# Patient Record
Sex: MALE | Race: WHITE | Employment: OTHER | URBAN - METROPOLITAN AREA
[De-identification: names, ages, dates, MRNs, and addresses within clinical notes are randomized per-mention and may not be internally consistent; named-entity substitution may affect disease eponyms.]

---

## 2018-02-18 PROBLEM — E66.01 OBESITY, CLASS III, BMI 40-49.9 (MORBID OBESITY) (HCC): Chronic | Status: ACTIVE | Noted: 2018-02-18

## 2018-02-18 PROBLEM — I96 GANGRENE OF TOE OF LEFT FOOT (HCC): Status: ACTIVE | Noted: 2018-02-18

## 2018-02-18 PROBLEM — E66.813 OBESITY, CLASS III, BMI 40-49.9 (MORBID OBESITY) (HCC): Chronic | Status: ACTIVE | Noted: 2018-02-18

## 2018-02-18 PROBLEM — E11.9 TYPE 2 DIABETES MELLITUS (HCC): Chronic | Status: ACTIVE | Noted: 2018-02-18

## 2018-02-18 PROBLEM — I10 ESSENTIAL HYPERTENSION: Chronic | Status: ACTIVE | Noted: 2018-02-18

## 2018-02-20 PROBLEM — E11.628 DIABETIC FOOT INFECTION (HCC): Status: ACTIVE | Noted: 2018-02-20

## 2018-02-20 PROBLEM — L08.9 DIABETIC FOOT INFECTION (HCC): Status: ACTIVE | Noted: 2018-02-20

## 2018-02-20 PROBLEM — M86.9 TOE OSTEOMYELITIS, LEFT (HCC): Status: ACTIVE | Noted: 2018-02-20

## 2018-02-28 ENCOUNTER — HOSPITAL ENCOUNTER (OUTPATIENT)
Dept: WOUND CARE | Age: 38
Discharge: HOME | End: 2018-02-28
Attending: PODIATRIST | Admitting: PODIATRIST

## 2018-02-28 VITALS
RESPIRATION RATE: 16 BRPM | SYSTOLIC BLOOD PRESSURE: 123 MMHG | TEMPERATURE: 97.4 F | DIASTOLIC BLOOD PRESSURE: 86 MMHG | HEART RATE: 100 BPM

## 2018-02-28 ASSESSMENT — PAIN SCALES - GENERAL: PAINLEVEL_OUTOF10: 5

## 2018-02-28 ASSESSMENT — PAIN DESCRIPTION - LOCATION: LOCATION: FOOT

## 2018-02-28 ASSESSMENT — PAIN DESCRIPTION - ORIENTATION: ORIENTATION: LEFT

## 2018-02-28 ASSESSMENT — PAIN DESCRIPTION - DESCRIPTORS: DESCRIPTORS: ACHING

## 2018-02-28 ASSESSMENT — PAIN DESCRIPTION - PAIN TYPE: TYPE: CHRONIC PAIN

## 2018-02-28 NOTE — PROGRESS NOTES
compression to the foot and ankle  -  PWB heel only  - Keep leg elevated when not active. - Stressed appropriate blood glucose control  -  Continue antibiotics per infectious disease  -  Follow-up in 2 weeks for suture removal  -The patient has any questions, medications he is to call the office.       Chyna Clemons DPM  Cell:[phone]

## 2018-03-21 ENCOUNTER — HOSPITAL ENCOUNTER (OUTPATIENT)
Dept: WOUND CARE | Age: 38
Discharge: HOME | End: 2018-03-21
Attending: PODIATRIST | Admitting: PODIATRIST

## 2018-03-21 VITALS
HEART RATE: 102 BPM | RESPIRATION RATE: 16 BRPM | DIASTOLIC BLOOD PRESSURE: 88 MMHG | TEMPERATURE: 97.1 F | SYSTOLIC BLOOD PRESSURE: 134 MMHG

## 2018-03-21 ASSESSMENT — PAIN DESCRIPTION - PAIN TYPE: TYPE: CHRONIC PAIN

## 2018-03-21 ASSESSMENT — PAIN DESCRIPTION - LOCATION: LOCATION: BACK

## 2018-03-21 ASSESSMENT — PAIN SCALES - GENERAL: PAINLEVEL_OUTOF10: 4

## 2018-04-04 ENCOUNTER — HOSPITAL ENCOUNTER (OUTPATIENT)
Dept: WOUND CARE | Age: 38
Discharge: HOME | End: 2018-04-05
Attending: PODIATRIST | Admitting: PODIATRIST

## 2019-09-24 ENCOUNTER — HOSPITAL ENCOUNTER (INPATIENT)
Age: 39
LOS: 4 days | Discharge: HOME | DRG: 314 | End: 2019-09-28
Attending: EMERGENCY MEDICINE | Admitting: INTERNAL MEDICINE
Payer: COMMERCIAL

## 2019-09-24 ENCOUNTER — APPOINTMENT (OUTPATIENT)
Dept: GENERAL RADIOLOGY | Age: 39
DRG: 314 | End: 2019-09-24
Payer: COMMERCIAL

## 2019-09-24 DIAGNOSIS — I96 TOE NECROSIS (HCC): Primary | ICD-10-CM

## 2019-09-24 DIAGNOSIS — Z79.4 TYPE 2 DIABETES MELLITUS WITH HYPERGLYCEMIA, WITH LONG-TERM CURRENT USE OF INSULIN (HCC): ICD-10-CM

## 2019-09-24 DIAGNOSIS — E11.65 TYPE 2 DIABETES MELLITUS WITH HYPERGLYCEMIA, WITH LONG-TERM CURRENT USE OF INSULIN (HCC): ICD-10-CM

## 2019-09-24 LAB
ANION GAP SERPL CALCULATED.3IONS-SCNC: 17 MMOL/L (ref 3–16)
BASOPHILS ABSOLUTE: 0.1 K/UL (ref 0–0.2)
BASOPHILS RELATIVE PERCENT: 0.7 %
BUN BLDV-MCNC: 19 MG/DL (ref 7–20)
C-REACTIVE PROTEIN: 27.9 MG/L (ref 0–5.1)
CALCIUM SERPL-MCNC: 9.6 MG/DL (ref 8.3–10.6)
CHLORIDE BLD-SCNC: 102 MMOL/L (ref 99–110)
CO2: 22 MMOL/L (ref 21–32)
CREAT SERPL-MCNC: 0.9 MG/DL (ref 0.9–1.3)
EOSINOPHILS ABSOLUTE: 0.3 K/UL (ref 0–0.6)
EOSINOPHILS RELATIVE PERCENT: 3.4 %
GFR AFRICAN AMERICAN: >60
GFR NON-AFRICAN AMERICAN: >60
GLUCOSE BLD-MCNC: 195 MG/DL (ref 70–99)
GLUCOSE BLD-MCNC: 201 MG/DL (ref 70–99)
GLUCOSE BLD-MCNC: 321 MG/DL (ref 70–99)
HCT VFR BLD CALC: 39.6 % (ref 40.5–52.5)
HEMOGLOBIN: 13.1 G/DL (ref 13.5–17.5)
LYMPHOCYTES ABSOLUTE: 2.2 K/UL (ref 1–5.1)
LYMPHOCYTES RELATIVE PERCENT: 22.9 %
MCH RBC QN AUTO: 27.8 PG (ref 26–34)
MCHC RBC AUTO-ENTMCNC: 33.1 G/DL (ref 31–36)
MCV RBC AUTO: 84.1 FL (ref 80–100)
MONOCYTES ABSOLUTE: 0.6 K/UL (ref 0–1.3)
MONOCYTES RELATIVE PERCENT: 6.8 %
NEUTROPHILS ABSOLUTE: 6.2 K/UL (ref 1.7–7.7)
NEUTROPHILS RELATIVE PERCENT: 66.2 %
PDW BLD-RTO: 14 % (ref 12.4–15.4)
PERFORMED ON: ABNORMAL
PERFORMED ON: ABNORMAL
PLATELET # BLD: 309 K/UL (ref 135–450)
PMV BLD AUTO: 8.3 FL (ref 5–10.5)
POTASSIUM REFLEX MAGNESIUM: 4.5 MMOL/L (ref 3.5–5.1)
RBC # BLD: 4.71 M/UL (ref 4.2–5.9)
SEDIMENTATION RATE, ERYTHROCYTE: 45 MM/HR (ref 0–15)
SODIUM BLD-SCNC: 141 MMOL/L (ref 136–145)
WBC # BLD: 9.4 K/UL (ref 4–11)

## 2019-09-24 PROCEDURE — 6360000002 HC RX W HCPCS: Performed by: EMERGENCY MEDICINE

## 2019-09-24 PROCEDURE — 86140 C-REACTIVE PROTEIN: CPT

## 2019-09-24 PROCEDURE — 73660 X-RAY EXAM OF TOE(S): CPT

## 2019-09-24 PROCEDURE — 1200000000 HC SEMI PRIVATE

## 2019-09-24 PROCEDURE — 2580000003 HC RX 258: Performed by: EMERGENCY MEDICINE

## 2019-09-24 PROCEDURE — 6370000000 HC RX 637 (ALT 250 FOR IP): Performed by: INTERNAL MEDICINE

## 2019-09-24 PROCEDURE — 6360000002 HC RX W HCPCS: Performed by: INTERNAL MEDICINE

## 2019-09-24 PROCEDURE — 96365 THER/PROPH/DIAG IV INF INIT: CPT

## 2019-09-24 PROCEDURE — 85025 COMPLETE CBC W/AUTO DIFF WBC: CPT

## 2019-09-24 PROCEDURE — 93005 ELECTROCARDIOGRAM TRACING: CPT | Performed by: INTERNAL MEDICINE

## 2019-09-24 PROCEDURE — 73630 X-RAY EXAM OF FOOT: CPT

## 2019-09-24 PROCEDURE — 85652 RBC SED RATE AUTOMATED: CPT

## 2019-09-24 PROCEDURE — 2580000003 HC RX 258: Performed by: INTERNAL MEDICINE

## 2019-09-24 PROCEDURE — 99285 EMERGENCY DEPT VISIT HI MDM: CPT

## 2019-09-24 PROCEDURE — 36415 COLL VENOUS BLD VENIPUNCTURE: CPT

## 2019-09-24 PROCEDURE — 83036 HEMOGLOBIN GLYCOSYLATED A1C: CPT

## 2019-09-24 PROCEDURE — 80048 BASIC METABOLIC PNL TOTAL CA: CPT

## 2019-09-24 RX ORDER — ATORVASTATIN CALCIUM 40 MG/1
40 TABLET, FILM COATED ORAL DAILY
Status: DISCONTINUED | OUTPATIENT
Start: 2019-09-25 | End: 2019-09-28 | Stop reason: HOSPADM

## 2019-09-24 RX ORDER — BUSPIRONE HYDROCHLORIDE 5 MG/1
7.5 TABLET ORAL 2 TIMES DAILY
Status: DISCONTINUED | OUTPATIENT
Start: 2019-09-24 | End: 2019-09-28 | Stop reason: HOSPADM

## 2019-09-24 RX ORDER — SODIUM CHLORIDE 9 MG/ML
INJECTION, SOLUTION INTRAVENOUS CONTINUOUS
Status: DISCONTINUED | OUTPATIENT
Start: 2019-09-24 | End: 2019-09-28 | Stop reason: HOSPADM

## 2019-09-24 RX ORDER — NICOTINE POLACRILEX 4 MG
15 LOZENGE BUCCAL PRN
Status: DISCONTINUED | OUTPATIENT
Start: 2019-09-24 | End: 2019-09-28 | Stop reason: HOSPADM

## 2019-09-24 RX ORDER — BUPRENORPHINE HYDROCHLORIDE AND NALOXONE HYDROCHLORIDE DIHYDRATE 2; .5 MG/1; MG/1
3 TABLET SUBLINGUAL DAILY
Status: DISCONTINUED | OUTPATIENT
Start: 2019-09-25 | End: 2019-09-25 | Stop reason: SDUPTHER

## 2019-09-24 RX ORDER — BUPRENORPHINE HYDROCHLORIDE AND NALOXONE HYDROCHLORIDE DIHYDRATE 8; 2 MG/1; MG/1
1 TABLET SUBLINGUAL DAILY
Status: DISCONTINUED | OUTPATIENT
Start: 2019-09-24 | End: 2019-09-24

## 2019-09-24 RX ORDER — SODIUM CHLORIDE 0.9 % (FLUSH) 0.9 %
10 SYRINGE (ML) INJECTION PRN
Status: DISCONTINUED | OUTPATIENT
Start: 2019-09-24 | End: 2019-09-28 | Stop reason: HOSPADM

## 2019-09-24 RX ORDER — SENNA PLUS 8.6 MG/1
2 TABLET ORAL DAILY
Status: ON HOLD | COMMUNITY
End: 2021-11-13 | Stop reason: HOSPADM

## 2019-09-24 RX ORDER — ONDANSETRON 2 MG/ML
4 INJECTION INTRAMUSCULAR; INTRAVENOUS EVERY 6 HOURS PRN
Status: DISCONTINUED | OUTPATIENT
Start: 2019-09-24 | End: 2019-09-28 | Stop reason: HOSPADM

## 2019-09-24 RX ORDER — SENNA PLUS 8.6 MG/1
2 TABLET ORAL DAILY
Status: DISCONTINUED | OUTPATIENT
Start: 2019-09-25 | End: 2019-09-26

## 2019-09-24 RX ORDER — LISINOPRIL 40 MG/1
40 TABLET ORAL DAILY
Status: DISCONTINUED | OUTPATIENT
Start: 2019-09-25 | End: 2019-09-28 | Stop reason: HOSPADM

## 2019-09-24 RX ORDER — SODIUM CHLORIDE 0.9 % (FLUSH) 0.9 %
10 SYRINGE (ML) INJECTION EVERY 12 HOURS SCHEDULED
Status: DISCONTINUED | OUTPATIENT
Start: 2019-09-24 | End: 2019-09-28 | Stop reason: HOSPADM

## 2019-09-24 RX ORDER — DEXTROSE MONOHYDRATE 25 G/50ML
12.5 INJECTION, SOLUTION INTRAVENOUS PRN
Status: DISCONTINUED | OUTPATIENT
Start: 2019-09-24 | End: 2019-09-28 | Stop reason: HOSPADM

## 2019-09-24 RX ORDER — HYDROCHLOROTHIAZIDE 12.5 MG/1
12.5 CAPSULE, GELATIN COATED ORAL DAILY
COMMUNITY
End: 2022-02-14 | Stop reason: ALTCHOICE

## 2019-09-24 RX ORDER — ACETAMINOPHEN 325 MG/1
650 TABLET ORAL EVERY 4 HOURS PRN
Status: DISCONTINUED | OUTPATIENT
Start: 2019-09-24 | End: 2019-09-28 | Stop reason: HOSPADM

## 2019-09-24 RX ORDER — NAPROXEN 500 MG/1
500 TABLET ORAL 2 TIMES DAILY WITH MEALS
Status: ON HOLD | COMMUNITY
End: 2021-11-13 | Stop reason: HOSPADM

## 2019-09-24 RX ORDER — 0.9 % SODIUM CHLORIDE 0.9 %
1000 INTRAVENOUS SOLUTION INTRAVENOUS ONCE
Status: COMPLETED | OUTPATIENT
Start: 2019-09-24 | End: 2019-09-24

## 2019-09-24 RX ORDER — ACETAMINOPHEN 160 MG
TABLET,DISINTEGRATING ORAL
COMMUNITY
End: 2022-05-09

## 2019-09-24 RX ORDER — DEXTROSE MONOHYDRATE 50 MG/ML
100 INJECTION, SOLUTION INTRAVENOUS PRN
Status: DISCONTINUED | OUTPATIENT
Start: 2019-09-24 | End: 2019-09-28 | Stop reason: HOSPADM

## 2019-09-24 RX ORDER — BUSPIRONE HYDROCHLORIDE 15 MG/1
7.5 TABLET ORAL 2 TIMES DAILY
COMMUNITY
End: 2021-11-08

## 2019-09-24 RX ADMIN — BUSPIRONE HYDROCHLORIDE 7.5 MG: 5 TABLET ORAL at 21:42

## 2019-09-24 RX ADMIN — SODIUM CHLORIDE: 9 INJECTION, SOLUTION INTRAVENOUS at 18:58

## 2019-09-24 RX ADMIN — VANCOMYCIN HYDROCHLORIDE 1500 MG: 10 INJECTION, POWDER, LYOPHILIZED, FOR SOLUTION INTRAVENOUS at 23:37

## 2019-09-24 RX ADMIN — VANCOMYCIN HYDROCHLORIDE 2000 MG: 10 INJECTION, POWDER, LYOPHILIZED, FOR SOLUTION INTRAVENOUS at 15:27

## 2019-09-24 RX ADMIN — SODIUM CHLORIDE 1000 ML: 9 INJECTION, SOLUTION INTRAVENOUS at 16:57

## 2019-09-24 RX ADMIN — ENOXAPARIN SODIUM 40 MG: 40 INJECTION SUBCUTANEOUS at 21:37

## 2019-09-24 RX ADMIN — Medication 10 ML: at 18:42

## 2019-09-24 RX ADMIN — INSULIN GLARGINE 30 UNITS: 100 INJECTION, SOLUTION SUBCUTANEOUS at 23:29

## 2019-09-24 RX ADMIN — INSULIN LISPRO 1 UNITS: 100 INJECTION, SOLUTION INTRAVENOUS; SUBCUTANEOUS at 23:29

## 2019-09-24 RX ADMIN — PIPERACILLIN SODIUM,TAZOBACTAM SODIUM 3.38 G: 3; .375 INJECTION, POWDER, FOR SOLUTION INTRAVENOUS at 14:53

## 2019-09-24 RX ADMIN — PIPERACILLIN SODIUM,TAZOBACTAM SODIUM 3.38 G: 3; .375 INJECTION, POWDER, FOR SOLUTION INTRAVENOUS at 21:38

## 2019-09-24 ASSESSMENT — PAIN SCALES - GENERAL: PAINLEVEL_OUTOF10: 0

## 2019-09-24 NOTE — ED PROVIDER NOTES
[address] [facility] ENCOUNTER          ATTENDING PHYSICIAN NOTE       Date of evaluation: 9/24/2019    ADDENDUM:      Care of this patient was assumed from Dr. Maru Rodriguez. The patient was seen for Toe Pain (right big toe infection)  . The patient's initial evaluation and plan have been discussed with the prior provider who initially evaluated the patient. Nursing Notes, Past Medical Hx, Past Surgical Hx, Social Hx, Allergies, and Family Hx were all reviewed.     Diagnostic Results     RADIOLOGY:  XR TOE RIGHT (MIN 2 VIEWS)   Final Result      Limited evaluation due to overlying artifact      No fracture      If concern for acute injury splinting with follow-up recommended      XR FOOT RIGHT (MIN 3 VIEWS)   Final Result      No acute bony pathology      VL DUP LOWER EXTREMITY ARTERIES BILATERAL    (Results Pending)       LABS:   Results for orders placed or performed during the hospital encounter of 09/24/19   CBC Auto Differential   Result Value Ref Range    WBC 9.4 4.0 - 11.0 K/uL    RBC 4.71 4.20 - 5.90 M/uL    Hemoglobin 13.1 (L) 13.5 - 17.5 g/dL    Hematocrit 39.6 (L) 40.5 - 52.5 %    MCV 84.1 80.0 - 100.0 fL    MCH 27.8 26.0 - 34.0 pg    MCHC 33.1 31.0 - 36.0 g/dL    RDW 14.0 12.4 - 15.4 %    Platelets 462 686 - 137 K/uL    MPV 8.3 5.0 - 10.5 fL    Neutrophils % 66.2 %    Lymphocytes % 22.9 %    Monocytes % 6.8 %    Eosinophils % 3.4 %    Basophils % 0.7 %    Neutrophils Absolute 6.2 1.7 - 7.7 K/uL    Lymphocytes Absolute 2.2 1.0 - 5.1 K/uL    Monocytes Absolute 0.6 0.0 - 1.3 K/uL    Eosinophils Absolute 0.3 0.0 - 0.6 K/uL    Basophils Absolute 0.1 0.0 - 0.2 K/uL   Basic Metabolic Panel w/ Reflex to MG   Result Value Ref Range    Sodium 141 136 - 145 mmol/L    Potassium reflex Magnesium 4.5 3.5 - 5.1 mmol/L    Chloride 102 99 - 110 mmol/L    CO2 22 21 - 32 mmol/L    Anion Gap 17 (H) 3 - 16    Glucose 321 (H) 70 - 99 mg/dL    BUN 19 7 - 20 mg/dL    CREATININE 0.9 0.9 - 1.3 mg/dL    GFR

## 2019-09-24 NOTE — ED NOTES
Called report to Rufus Al on 5S.  Pt stable for transport to [address] [place] [address] [address], RN  09/24/19 7475

## 2019-09-24 NOTE — CONSULTS
Clinical Pharmacy Consult Note       Pharmacy consulted by Dr. Guanako White in ED to order first dose of vancomycin. Indication :   R big toe infection    Height:  6' 3\" (190.5 cm)  Weight: Weight: (!) 345 lb (156.5 kg)    Plan: Will order vancomycin 2g IV x 1 dose, to be administered in ED. Please note this consult covers ED vancomycin dose only. If admitting provider would like further vancomycin dose management by pharmacy, please place additional consult order. Thank you for the consult. Please call with questions.   Kaveh Conklin, PharmD, BCPS  Main pharmacy: [id number]  9/24/2019 2:29 PM

## 2019-09-24 NOTE — CARE COORDINATION
Case Management Assessment           Initial Evaluation                Date / Time of Evaluation: 9/24/2019 5:28 PM                 Assessment Completed by: Lisa Ospina    Patient Name: Andrey Perdomo     YOB: 1980  Diagnosis: Toe gangrene [facility]) Dora Edwards     Date / Time: 9/24/2019  1:02 PM    Patient Admission Status: Inpatient    If patient is discharged prior to next notation, then this note serves as note for discharge by case management. Current PCP: Danny Graham. Gracie Mejia MD, MD  Clinic Patient: No    Chart Reviewed: Yes  Patient/ Family Interviewed: Yes    Initial assessment completed at bedside with: Patient    Hospitalization in the last 30 days: No    Emergency Contacts:  Extended Emergency Contact Information  Primary Emergency Contact: Wilver Harden  Address: [address] [place] [address] Phone: [phone]  Mobile Phone: [phone]  Relation: Parent  Secondary Emergency Contact: None,Per Pt  Relation: Other    Advance Directives:   Code Status: Prior    Healthcare Power of : No    Financial  Payor: Matt Coronado / Plan: [employer] / Product Type: *No Product type* /     Pre-cert required for SNF: Yes    Pharmacy  No Pharmacies Listed    Potential assistance Purchasing Medications:    Does Patient want to participate in local refill/ meds to beds program?:      Meds To PinPay Rules:  1. Can ONLY be done Monday- Friday between 8:30am-5pm  2. Prescription(s) must be in pharmacy by 3pm to be filled same day  3. Copy of patient's insurance/ prescription drug card and patient face sheet must be sent along with the prescription(s)  4. Cost of Rx cannot be added to hospital bill. If financial assistance is needed, please contact unit  or ;  or  CANNOT provide pharmacy voucher for patients co-pays  5.  Patients can then  the prescription on their way out of the

## 2019-09-24 NOTE — ED PROVIDER NOTES
[address] [facility]          ATTENDING PHYSICIAN NOTE       Date of evaluation: 9/24/2019    Chief Complaint     Toe Pain (right big toe infection)    History of Present Illness     Mare Aburto is a 37 y.o. male who presents to the emergency department with concern for right toe infection. He states he noticed on Thursday evening a dark spot at the base of his toenail so he wrapped up his toe and then when he checked his foot on Friday the entire pad of his toe was black which he thought was a blood blister that had already \"popped\" because there was a minimal amount of blood in his sock as well. He rewrapped the toe and then on Saturday noticed worsening spreading of the blister towards the base now with what he thought was drainage consistent with pus. He chose to wait to come in due to having an appointment with his addiction physician today and being clean for 4 years he did not want to chance missing the appointment. He is on Suboxone 6 mg daily for previous opiate addiction. He has a history of DM and previous toe amputations on the left foot. He denies any systemic symptoms including fevers, chills, nausea, vomiting, abdominal pain. He reports he has decreased sensation at baseline in his feet and has not had any difficulty with walking before or after this occurred. Takes naproxen BID for pain at baseline and took that this morning. Denies any other symptoms or modifying factors. Review of Systems     Review of Systems   Constitutional: Negative for appetite change, chills and fever. HENT: Negative for congestion. Eyes: Negative for photophobia and visual disturbance. Respiratory: Negative for cough and shortness of breath. Cardiovascular: Negative for chest pain and leg swelling. Gastrointestinal: Negative for abdominal pain, constipation and diarrhea. Genitourinary: Negative for difficulty urinating and dysuria.    Musculoskeletal: Negative for neck pain. Skin:        Right big toe consistent with dry gangrene/necrosis   Allergic/Immunologic: Negative. Neurological: Negative for dizziness and syncope. Past Medical, Surgical, Family, and Social History     He has a past medical history of Depression, Diabetes mellitus ([address] Hyperlipidemia, Hypertension, and Osteoarthritis of both knees. He has a past surgical history that includes knee surgery (Right, 6/19/2013); Toe amputation (Left); and Foot surgery (Left, 02/19/2018). His family history is not on file. He reports that he has never smoked. He has never used smokeless tobacco. He reports that he does not drink alcohol or use drugs. Medications     Previous Medications    ATORVASTATIN (LIPITOR) 40 MG TABLET    Take 40 mg by mouth daily    BUPRENORPHINE-NALOXONE (SUBOXONE) 8-2 MG SUBL SL TABLET    Place 1 tablet under the tongue daily. BUSPIRONE (BUSPAR) 15 MG TABLET    Take 7.5 mg by mouth 2 times daily    CHOLECALCIFEROL (VITAMIN D3) 2000 UNITS CAPS    Take by mouth    ERTUGLIFLOZIN L-PYROGLUTAMICAC (STEGLATRO) 5 MG TABS    Take 5 mg by mouth daily    HYDROCHLOROTHIAZIDE (MICROZIDE) 12.5 MG CAPSULE    Take 12.5 mg by mouth daily    INSULIN GLARGINE (LANTUS) 100 UNIT/ML INJECTION PEN    Inject 40 Units into the skin nightly    INSULIN LISPRO (HUMALOG) 100 UNIT/ML INJECTION VIAL    Inject 30 Units into the skin 3 times daily (before meals) May add insulin depending on sugar/    LISINOPRIL (PRINIVIL;ZESTRIL) 40 MG TABLET    Take 40 mg by mouth daily    NAPROXEN (NAPROSYN) 500 MG TABLET    Take 500 mg by mouth 2 times daily (with meals)    SENNA (SENOKOT) 8.6 MG TABLET    Take 2 tablets by mouth daily    VORTIOXETINE HBR (TRINTELLIX) 20 MG TABS TABLET    Take 20 mg by mouth daily       Allergies     He is allergic to cephalexin.     Physical Exam     INITIAL VITALS: BP: (!) 152/90, Temp: 98.2 °F (36.8 °C), Pulse: 100, Resp: 18, SpO2: 97 %    Physical Exam   Constitutional: He is oriented to immediate potential for life-threatening deterioration due to necrosis of toe, I spent 25 minutes providing critical care. Thistime excludes time spent performing procedures but includes time spent on direct patient care, history retrieval, review of the chart, and discussions with patient, family, and consultant(s). Clinical Impression     1. Toe necrosis (HCC)        Disposition     PATIENT REFERRED TO:  No follow-up provider specified.     DISCHARGE MEDICATIONS:  New Prescriptions    No medications on file       DISPOSITION

## 2019-09-24 NOTE — H&P
Hospital Medicine History & Physical      PCP: Zay Vásquez. Hiral Mann MD, MD    Date of Admission: 9/24/2019    Date of Service: Pt seen/examined on 9/24/2019 and Admitted to Inpatient with expected LOS greater than two midnights due to medical therapy. Chief Complaint:  R big toe infection      History Of Present Illness: The patient is a 37 y.o. male with medical history of DM, HTN, previous toe ampuations, who presents to [facility] with progressive changes to hi R big toe. It started at a black spot under nail bed about 1 week and and progressed since then. Patient denies any trauma. He has no pain. Denies any fevers or chills at home. Patient was seen by podiatry surgery and amputation is planned for either tmrw or next day. Patient denies any cardiac history. He does not work or exercise but can walk 2 blocks, does his laundry, cleaning and grocery shopping. No chest pain, dyspnea or palpitations. Allergy is listed to cephalexin, patient was given zosyn in ER and tolerating it well. Past Medical History:        Diagnosis Date    Depression     Diabetes mellitus ([facility]     Hyperlipidemia     Hypertension     Osteoarthritis of both knees        Past Surgical History:        Procedure Laterality Date    FOOT SURGERY Left 02/19/2018     INCISION AND DRAINAGE WITH HALLUX AMPUTATION LEFT FOOT    KNEE SURGERY Right 6/19/2013    TOE AMPUTATION Left     2nd toe       Medications Prior to Admission:    Prior to Admission medications    Medication Sig Start Date End Date Taking?  Authorizing Provider   naproxen (NAPROSYN) 500 MG tablet Take 500 mg by mouth 2 times daily (with meals)   Yes Historical Provider, MD   VORTIoxetine HBr (TRINTELLIX) 20 MG TABS tablet Take 20 mg by mouth daily   Yes Historical Provider, MD   busPIRone (BUSPAR) 15 MG tablet Take 7.5 mg by mouth 2 times daily   Yes Historical Provider, MD   hydrochlorothiazide (MICROZIDE) 12.5 MG capsule Take 12.5 mg by mouth daily

## 2019-09-25 ENCOUNTER — APPOINTMENT (OUTPATIENT)
Dept: VASCULAR LAB | Age: 39
DRG: 314 | End: 2019-09-25
Payer: COMMERCIAL

## 2019-09-25 ENCOUNTER — ANESTHESIA EVENT (OUTPATIENT)
Dept: OPERATING ROOM | Age: 39
DRG: 314 | End: 2019-09-25
Payer: COMMERCIAL

## 2019-09-25 PROBLEM — E11.42 DIABETIC POLYNEUROPATHY (HCC): Status: ACTIVE | Noted: 2019-09-25

## 2019-09-25 LAB
ANION GAP SERPL CALCULATED.3IONS-SCNC: 10 MMOL/L (ref 3–16)
BASOPHILS ABSOLUTE: 0.1 K/UL (ref 0–0.2)
BASOPHILS ABSOLUTE: 0.1 K/UL (ref 0–0.2)
BASOPHILS RELATIVE PERCENT: 0.9 %
BASOPHILS RELATIVE PERCENT: 1 %
BUN BLDV-MCNC: 14 MG/DL (ref 7–20)
CALCIUM SERPL-MCNC: 8.8 MG/DL (ref 8.3–10.6)
CHLORIDE BLD-SCNC: 102 MMOL/L (ref 99–110)
CO2: 25 MMOL/L (ref 21–32)
CREAT SERPL-MCNC: 0.7 MG/DL (ref 0.9–1.3)
EKG ATRIAL RATE: 82 BPM
EKG DIAGNOSIS: NORMAL
EKG P AXIS: 45 DEGREES
EKG P-R INTERVAL: 206 MS
EKG Q-T INTERVAL: 364 MS
EKG QRS DURATION: 96 MS
EKG QTC CALCULATION (BAZETT): 425 MS
EKG R AXIS: -5 DEGREES
EKG T AXIS: 55 DEGREES
EKG VENTRICULAR RATE: 82 BPM
EOSINOPHILS ABSOLUTE: 0.7 K/UL (ref 0–0.6)
EOSINOPHILS ABSOLUTE: 0.8 K/UL (ref 0–0.6)
EOSINOPHILS RELATIVE PERCENT: 10.3 %
EOSINOPHILS RELATIVE PERCENT: 8.3 %
ESTIMATED AVERAGE GLUCOSE: 251.8 MG/DL
GFR AFRICAN AMERICAN: >60
GFR NON-AFRICAN AMERICAN: >60
GLUCOSE BLD-MCNC: 179 MG/DL (ref 70–99)
GLUCOSE BLD-MCNC: 187 MG/DL (ref 70–99)
GLUCOSE BLD-MCNC: 187 MG/DL (ref 70–99)
GLUCOSE BLD-MCNC: 199 MG/DL (ref 70–99)
GLUCOSE BLD-MCNC: 239 MG/DL (ref 70–99)
HBA1C MFR BLD: 10.4 %
HCT VFR BLD CALC: 34.1 % (ref 40.5–52.5)
HCT VFR BLD CALC: 34.6 % (ref 40.5–52.5)
HEMOGLOBIN: 11.2 G/DL (ref 13.5–17.5)
HEMOGLOBIN: 11.4 G/DL (ref 13.5–17.5)
INR BLD: 1 (ref 0.86–1.14)
LYMPHOCYTES ABSOLUTE: 2.5 K/UL (ref 1–5.1)
LYMPHOCYTES ABSOLUTE: 2.8 K/UL (ref 1–5.1)
LYMPHOCYTES RELATIVE PERCENT: 30.8 %
LYMPHOCYTES RELATIVE PERCENT: 37.4 %
MCH RBC QN AUTO: 27.7 PG (ref 26–34)
MCH RBC QN AUTO: 28 PG (ref 26–34)
MCHC RBC AUTO-ENTMCNC: 32.7 G/DL (ref 31–36)
MCHC RBC AUTO-ENTMCNC: 32.8 G/DL (ref 31–36)
MCV RBC AUTO: 84.3 FL (ref 80–100)
MCV RBC AUTO: 85.4 FL (ref 80–100)
MONOCYTES ABSOLUTE: 0.4 K/UL (ref 0–1.3)
MONOCYTES ABSOLUTE: 0.5 K/UL (ref 0–1.3)
MONOCYTES RELATIVE PERCENT: 4.6 %
MONOCYTES RELATIVE PERCENT: 7.2 %
NEUTROPHILS ABSOLUTE: 3.3 K/UL (ref 1.7–7.7)
NEUTROPHILS ABSOLUTE: 4.4 K/UL (ref 1.7–7.7)
NEUTROPHILS RELATIVE PERCENT: 44.2 %
NEUTROPHILS RELATIVE PERCENT: 55.3 %
PDW BLD-RTO: 13.8 % (ref 12.4–15.4)
PDW BLD-RTO: 13.9 % (ref 12.4–15.4)
PERFORMED ON: ABNORMAL
PLATELET # BLD: 247 K/UL (ref 135–450)
PLATELET # BLD: 262 K/UL (ref 135–450)
PMV BLD AUTO: 7.6 FL (ref 5–10.5)
PMV BLD AUTO: 7.7 FL (ref 5–10.5)
POTASSIUM REFLEX MAGNESIUM: 4 MMOL/L (ref 3.5–5.1)
PROTHROMBIN TIME: 11.4 SEC (ref 9.8–13)
RBC # BLD: 3.99 M/UL (ref 4.2–5.9)
RBC # BLD: 4.11 M/UL (ref 4.2–5.9)
SODIUM BLD-SCNC: 137 MMOL/L (ref 136–145)
WBC # BLD: 7.5 K/UL (ref 4–11)
WBC # BLD: 8 K/UL (ref 4–11)

## 2019-09-25 PROCEDURE — 2580000003 HC RX 258: Performed by: INTERNAL MEDICINE

## 2019-09-25 PROCEDURE — 6360000002 HC RX W HCPCS: Performed by: PODIATRIST

## 2019-09-25 PROCEDURE — 93925 LOWER EXTREMITY STUDY: CPT

## 2019-09-25 PROCEDURE — 36415 COLL VENOUS BLD VENIPUNCTURE: CPT

## 2019-09-25 PROCEDURE — 97161 PT EVAL LOW COMPLEX 20 MIN: CPT

## 2019-09-25 PROCEDURE — 6370000000 HC RX 637 (ALT 250 FOR IP): Performed by: STUDENT IN AN ORGANIZED HEALTH CARE EDUCATION/TRAINING PROGRAM

## 2019-09-25 PROCEDURE — 6360000002 HC RX W HCPCS: Performed by: INTERNAL MEDICINE

## 2019-09-25 PROCEDURE — 97530 THERAPEUTIC ACTIVITIES: CPT

## 2019-09-25 PROCEDURE — 6370000000 HC RX 637 (ALT 250 FOR IP): Performed by: INTERNAL MEDICINE

## 2019-09-25 PROCEDURE — 93010 ELECTROCARDIOGRAM REPORT: CPT | Performed by: INTERNAL MEDICINE

## 2019-09-25 PROCEDURE — 93926 LOWER EXTREMITY STUDY: CPT

## 2019-09-25 PROCEDURE — 85025 COMPLETE CBC W/AUTO DIFF WBC: CPT

## 2019-09-25 PROCEDURE — 97116 GAIT TRAINING THERAPY: CPT

## 2019-09-25 PROCEDURE — 94150 VITAL CAPACITY TEST: CPT

## 2019-09-25 PROCEDURE — 80048 BASIC METABOLIC PNL TOTAL CA: CPT

## 2019-09-25 PROCEDURE — 1200000000 HC SEMI PRIVATE

## 2019-09-25 PROCEDURE — 85610 PROTHROMBIN TIME: CPT

## 2019-09-25 PROCEDURE — 97165 OT EVAL LOW COMPLEX 30 MIN: CPT

## 2019-09-25 PROCEDURE — 99255 IP/OBS CONSLTJ NEW/EST HI 80: CPT | Performed by: INTERNAL MEDICINE

## 2019-09-25 RX ORDER — BUPRENORPHINE HYDROCHLORIDE AND NALOXONE HYDROCHLORIDE DIHYDRATE 2; .5 MG/1; MG/1
3 TABLET SUBLINGUAL DAILY
Status: DISCONTINUED | OUTPATIENT
Start: 2019-09-25 | End: 2019-09-28 | Stop reason: HOSPADM

## 2019-09-25 RX ADMIN — VANCOMYCIN HYDROCHLORIDE 1500 MG: 10 INJECTION, POWDER, LYOPHILIZED, FOR SOLUTION INTRAVENOUS at 07:21

## 2019-09-25 RX ADMIN — SENNOSIDES 17.2 MG: 8.6 TABLET, FILM COATED ORAL at 10:42

## 2019-09-25 RX ADMIN — PIPERACILLIN SODIUM,TAZOBACTAM SODIUM 3.38 G: 3; .375 INJECTION, POWDER, FOR SOLUTION INTRAVENOUS at 21:47

## 2019-09-25 RX ADMIN — COLLAGENASE SANTYL: 250 OINTMENT TOPICAL at 14:29

## 2019-09-25 RX ADMIN — INSULIN LISPRO 30 UNITS: 100 INJECTION, SOLUTION INTRAVENOUS; SUBCUTANEOUS at 18:04

## 2019-09-25 RX ADMIN — INSULIN LISPRO 2 UNITS: 100 INJECTION, SOLUTION INTRAVENOUS; SUBCUTANEOUS at 21:56

## 2019-09-25 RX ADMIN — LISINOPRIL 40 MG: 40 TABLET ORAL at 10:42

## 2019-09-25 RX ADMIN — VANCOMYCIN HYDROCHLORIDE 1500 MG: 10 INJECTION, POWDER, LYOPHILIZED, FOR SOLUTION INTRAVENOUS at 14:51

## 2019-09-25 RX ADMIN — BUSPIRONE HYDROCHLORIDE 7.5 MG: 5 TABLET ORAL at 10:42

## 2019-09-25 RX ADMIN — SODIUM CHLORIDE: 9 INJECTION, SOLUTION INTRAVENOUS at 14:51

## 2019-09-25 RX ADMIN — VORTIOXETINE 20 MG: 10 TABLET, FILM COATED ORAL at 10:42

## 2019-09-25 RX ADMIN — PIPERACILLIN SODIUM,TAZOBACTAM SODIUM 3.38 G: 3; .375 INJECTION, POWDER, FOR SOLUTION INTRAVENOUS at 04:41

## 2019-09-25 RX ADMIN — ENOXAPARIN SODIUM 40 MG: 40 INJECTION SUBCUTANEOUS at 10:42

## 2019-09-25 RX ADMIN — BUSPIRONE HYDROCHLORIDE 7.5 MG: 5 TABLET ORAL at 07:05

## 2019-09-25 RX ADMIN — INSULIN LISPRO 2 UNITS: 100 INJECTION, SOLUTION INTRAVENOUS; SUBCUTANEOUS at 18:03

## 2019-09-25 RX ADMIN — SODIUM CHLORIDE: 9 INJECTION, SOLUTION INTRAVENOUS at 04:48

## 2019-09-25 RX ADMIN — INSULIN GLARGINE 30 UNITS: 100 INJECTION, SOLUTION SUBCUTANEOUS at 10:48

## 2019-09-25 RX ADMIN — PIPERACILLIN SODIUM,TAZOBACTAM SODIUM 3.38 G: 3; .375 INJECTION, POWDER, FOR SOLUTION INTRAVENOUS at 14:25

## 2019-09-25 RX ADMIN — BUPRENORPHINE AND NALOXONE 3 TABLET: 2; .5 TABLET SUBLINGUAL at 14:24

## 2019-09-25 RX ADMIN — ATORVASTATIN CALCIUM 40 MG: 40 TABLET, FILM COATED ORAL at 10:42

## 2019-09-25 RX ADMIN — VITAMIN D 2000 UNITS: 25 TAB ORAL at 10:42

## 2019-09-25 RX ADMIN — INSULIN LISPRO 30 UNITS: 100 INJECTION, SOLUTION INTRAVENOUS; SUBCUTANEOUS at 10:50

## 2019-09-25 RX ADMIN — INSULIN LISPRO 2 UNITS: 100 INJECTION, SOLUTION INTRAVENOUS; SUBCUTANEOUS at 10:50

## 2019-09-25 ASSESSMENT — PAIN SCALES - GENERAL
PAINLEVEL_OUTOF10: 0
PAINLEVEL_OUTOF10: 0

## 2019-09-25 NOTE — CARE COORDINATION
CM following, pt getting arterial studies today, then OR Thursday for Toe amp. Will assess any new needs post-op.   Electronically signed by Altaf Sanders RN on 9/25/2019 at 10:08 AM  [phone]

## 2019-09-25 NOTE — PROGRESS NOTES
Clinical Pharmacy Progress Note    Admit date: 9/24/2019     Subjective/Objective:  Pt is a 43yom with PMHx that includes HTN, HLD, diabetes, and prior left hallux amputation who is admitted with B/L LE wounds and Rt hallux dry gangrene. Interval update:  Planning for likely right hallux amputation tomorrow. Vascular studies pending. Pharmacy is consulted to dose Vancomycin per Dr. Stacey Rosas    Current antibiotics:  Zosyn 3.375g IV EI q8h - day #2  Vancomycin - Pharmacy to dose - day #2   2g IV x1 9/24 15:30   1.5g IV q8h (9/24 - current)    Prior experience with Vancomycin dosing in this patient:  · June 2015 - admitted at [facility]. Per review of their records, patient was on 1.75g IV q8h, which resulted in a trough of 7.2mcg/mL. SCr 0.7-0.8 at that time. · Feb 2018 - admitted here at [facility]. Started on 2g IV q8h, but Vancomycin discontinued prior to checking a trough. Recent Labs     09/24/19  1429 09/25/19  0530    137   K 4.5 4.0    102   CO2 22 25   BUN 19 14   CREATININE 0.9 0.7*   GLUCOSE 321* 199*       Est CrCl > 120mL/min    Lab Results   Component Value Date    WBC 7.5 09/25/2019    HGB 11.2 (L) 09/25/2019    HCT 34.1 (L) 09/25/2019    MCV 85.4 09/25/2019     09/25/2019       Lab Results   Component Value Date    PROTIME 11.4 09/25/2019    INR 1.00 09/25/2019       Height:  6' 3\" (190.5 cm)  Weight:  Weight: (!) 345 lb (156.5 kg)    Vancomycin Levels:  Trough  9/26 @ 07:30 = pending     Culture results:  None available    Prophylaxis:  VTE:  Enoxaparin - on hold after this AM's dose for procedure  GI:  Not indicated    Vaccination screening:  Pneumonia:  Up to date  Influenza:  Ordered to be given 8/81 per policy    Assessment/Plan:  1)  B/L LE wounds and Rt hallux dry gangrene:  Zosyn + Vancomycin - day #2  · Vancomycin - Pharmacy to dose  · Given risk of accumulation with obesity, will continue 1.5g IV q8h. · Trough has been ordered with dose due 9/26 @ 07:30.   Desired trough

## 2019-09-25 NOTE — PROGRESS NOTES
Occupational Therapy   Occupational Therapy Initial Assessment and Treatment  Discharge  Date: 2019   Patient Name: Stevie Be  MRN: [id number]     : 1980    Date of Service: 2019    Discharge Recommendations:  Stevie Be scored a  on the AM-PAC ADL Inpatient form. Current research shows that an AM-PAC score of 18 or greater is typically associated with a discharge to the patient's home setting. OT Equipment Recommendations  Equipment Needed: No    Assessment   Assessment: Pt seen for evaluation - heel WB RLE, WBAT LLE. Pt demonstrating awareness of WB status and steady w/ ambulation, transfers, ADLs. Pt has no skilled OT needs at this time. Will sign off. Plans are to discharge to his mother's home at discharge. Decision Making: Low Complexity  OT Education: OT Role;Family Education;Precautions  No Skilled OT: No OT goals identified  REQUIRES OT FOLLOW UP: No  Activity Tolerance  Activity Tolerance: Patient Tolerated treatment well  Safety Devices  Safety Devices in place: Yes  Type of devices: Nurse notified; Left in bed;Call light within reach           Patient Diagnosis(es): The primary encounter diagnosis was Toe necrosis ([address] A diagnosis of Type 2 diabetes mellitus with hyperglycemia, with long-term current use of insulin (HCC) was also pertinent to this visit. has a past medical history of Depression, Diabetes mellitus ([address] Hyperlipidemia, Hypertension, and Osteoarthritis of both knees. has a past surgical history that includes knee surgery (Right, 2013); Toe amputation (Left); and Foot surgery (Left, 2018).            Restrictions  Position Activity Restriction  Other position/activity restrictions: heel weight bearing only per podiatry note on RLE, activity as tolerated    Subjective   General  Chart Reviewed: Yes  Patient assessed for rehabilitation services?: Yes  Additional Pertinent Hx: 37 y.o. M to ED  w/ c/o    Hospital Course: pending OR 9/26. PMH:  HTN, obesity, narcotic dependence, DM type 2. Family / Caregiver Present: No  Referring Practitioner: Kenji Durham DPM  Diagnosis: Toe Gangrene    Subjective  Subjective: Supine in bed on entry. \"They didn't tell me about that (WB status). \"  Plans are to discharge to his mother's home and denies concerns for discharge. Patient Currently in Pain: Denies      Social/Functional History  Social/Functional History  Lives With: Family(mother who will be home most of the day)  Type of Home: Apartment  Home Layout: One level  Home Access: Stairs to enter with rails  Entrance Stairs - Number of Steps: 8  Entrance Stairs - Rails: Both  Bathroom Shower/Tub: Tub/Shower unit  Bathroom Toilet: Handicap height  Bathroom Equipment: 3-in-1 commode, Shower chair  Home Equipment: Crutches, Cane, Rolling walker  ADL Assistance: Independent  Homemaking Assistance: Independent  Homemaking Responsibilities: Yes  Ambulation Assistance: Independent  Transfer Assistance: Independent  Active : No(mother drives to grocery store and medical appointments)  Occupation: Unemployed  Additional Comments: patient reports no recent        Objective   Vision: Impaired  Vision Exceptions: Wears glasses at all times(observed to close R eye at times \"I can read better when I close my one eye. Same with watching TV.\"; states he has had his vision checked recently and it is normal; \"I don't know why I do that. \")      Orientation  Overall Orientation Status: Within Functional Limits        Balance  Sitting Balance: Independent  Standing Balance: Modified independent     Functional Mobility  Functional - Mobility Device: No device  Activity: To/from bathroom; Other  Assist Level: Modified independent   Functional Mobility Comments: Note: steady, heel WB status RLE    Toilet Transfers  Toilet - Technique: Ambulating  Equipment Used: Standard toilet  Toilet Transfer: Modified independent       Tone RUE  RUE Tone: Normotonic  Tone LUETHEL LOPEZ

## 2019-09-25 NOTE — CONSULTS
Infectious Diseases Inpatient Consult Note  RESIDENT NOTE - reviewed / edited, attending note at bottom    Reason for Consult:   Diabetic foot infection, toe gangrene  Requesting Physician:   Dr. Tre Herrera   Primary Care Physician:  Ry Mcgee. Alejandro Banda MD, MD  History Obtained From:   Pt, EPIC    Admit Date: 9/24/2019  Hospital Day: 2    CHIEF COMPLAINT:       Chief Complaint   Patient presents with    Toe Pain     right big toe infection       HISTORY OF PRESENT ILLNESS:      Gwen Pat is a 37 y.o. male with a PMH listed below who was consulted for a diabetic foot infection. Patient states last Thursday his nail became loose, so he wrapped it up and left it alone. Saturday/Sunday he took the dressing off and noticed that his entire right big toe had become black and red. In the ED, patient was afebrile with no leukocytosis noted. The toe was noted to be dry and necrotic on the plantar aspect, and wet and fibrotic on the dorsal aspect. Erythema extending proximally was noted as well. Given the overall clinical appearance of the toe/foot, it was determined the patient would benefit from IV antibiotics and surgical intervention. Patient is scheduled to undergo right foot ID with likely hallux amputation vs partial first ray amputation per podiatry on 9/26/19. Patient seen at bedside this pm  Denies any F/N/V/C/SOB/CP and has no other complaints at this time.       Past Medical History:    Past Medical History:   Diagnosis Date    Depression     Diabetes mellitus ([address]     Hyperlipidemia     Hypertension     Osteoarthritis of both knees      Past Surgical History:    Past Surgical History:   Procedure Laterality Date    FOOT SURGERY Left 02/19/2018     INCISION AND DRAINAGE WITH HALLUX AMPUTATION LEFT FOOT    KNEE SURGERY Right 6/19/2013    TOE AMPUTATION Left     2nd toe     Current Medications:     [START ON 9/26/2019] influenza virus vaccine  0.5 mL Intramuscular Once    insulin glargine  20 Units noted  -ESR/CRP elevated  -Currently on IV vanc/zosyn  -Wound management per podiatry.   -Plan for surgical debridement on 9/26/19    Will discuss patient assessment and plan with MD Jean Rangel DPM  PGY-2, Pager #: [phone]    Addendum to Resident Consult note:  Pt seen, examined and evaluated. I have reviewed the current history, physical findings, labs and assessment and plan and agree with note as documented by resident (Dr. Joselyn Ferguson). 36 yo man with hx DM, neuropathy. Past L 2nd toe resection (2015), partial L hallux amputation (2/2018)    Presents with R hallux infection / necrosis s/p injury (see HPI for story)  Admit 9/24 - afeb, normal WBC, ESR 45, CRP 27.9.   Seen by Podiatry and plan for hallux resection    IMP/  DM foot infection    REC/  Cont zosyn  Change vanco to linezolid  Await OR findings, cult    Discussed with pt   Angela Brandt MD

## 2019-09-25 NOTE — PROGRESS NOTES
hours) at 9/25/2019 1050  Last data filed at 9/25/2019 0446  Gross per 24 hour   Intake 1079 ml   Output 900 ml   Net 179 ml       Exam:      General appearance: No apparent distress, appears stated age and cooperative. Lungs: Clear to ascultation, bilaterally without Rales/Wheezes/Rhonchi with good respiratory effort. Heart: Regular rate and rhythm with Normal S1/S2 without  murmurs, rubs or gallops, point of maximum impulse non-displaced  Abdomen: Soft, non-tender or non-distended without rigidity or guarding and positive bowel sounds all four quadrants. Extremities: No clubbing, cyanosis, or edema bilaterally. R foot is covered in dressing  Skin: Skin color, texture, turgor normal.  Pictures reviewed  Neurologic: Alert and oriented X 3,   grossly non-focal.  Mental status: Alert, oriented, thought content appropriate. Data    Recent Labs     09/24/19  1429 09/25/19  0530   WBC 9.4 7.5   HGB 13.1* 11.2*   HCT 39.6* 34.1*    247      Recent Labs     09/24/19  1429 09/25/19  0530    137   K 4.5 4.0    102   CO2 22 25   BUN 19 14   CREATININE 0.9 0.7*     No results for input(s): AST, ALT, ALB, BILIDIR, BILITOT, ALKPHOS in the last 72 hours. Recent Labs     09/25/19  0530   INR 1.00     No results for input(s): CKTOTAL, CKMB, CKMBINDEX, TROPONINI in the last 72 hours. Consults:     IP CONSULT TO PODIATRY  PHARMACY TO DOSE VANCOMYCIN  IP CONSULT TO HOSPITALIST  IP CONSULT TO INFECTIOUS DISEASES  IP CONSULT TO PHARMACY    Active Hospital Problems    Diagnosis Date Noted    Toe gangrene ([facility] [address] Kwame Mays 09/24/2019         ASSESSMENT AND PLAN      Diabetic foot infection and dry gangrene R hallux:  Cont with empiric zosyv and vancomycin  Arterial duplex pending from today  Podiatry and ID consulted, appreciate recommendations  Plan for OR on Thursday.  Patient is medically cleared by surgery       Dm type 2:  Cont with lantus 30 BID and lispro 30 with each meal, ISS, will decrease lantus to 20 BID before surgery     HTN  Cont with lisinopril     HLD:  Cont with statin        DVT Prophylaxis: SCD, lovenox, hold in am  Diet: DIET CARB CONTROL;   Diet NPO Time Specified Exceptions are: Sips with Meds  Code Status: Full Code    PT/OT Eval Status:NWB on Talat Bryan MD

## 2019-09-26 ENCOUNTER — APPOINTMENT (OUTPATIENT)
Dept: GENERAL RADIOLOGY | Age: 39
DRG: 314 | End: 2019-09-26
Payer: COMMERCIAL

## 2019-09-26 ENCOUNTER — ANESTHESIA (OUTPATIENT)
Dept: OPERATING ROOM | Age: 39
DRG: 314 | End: 2019-09-26
Payer: COMMERCIAL

## 2019-09-26 VITALS — DIASTOLIC BLOOD PRESSURE: 54 MMHG | TEMPERATURE: 97.3 F | SYSTOLIC BLOOD PRESSURE: 84 MMHG | OXYGEN SATURATION: 95 %

## 2019-09-26 LAB
ABO/RH: NORMAL
ANION GAP SERPL CALCULATED.3IONS-SCNC: 10 MMOL/L (ref 3–16)
ANTIBODY SCREEN: NORMAL
BUN BLDV-MCNC: 13 MG/DL (ref 7–20)
CALCIUM SERPL-MCNC: 8.8 MG/DL (ref 8.3–10.6)
CHLORIDE BLD-SCNC: 103 MMOL/L (ref 99–110)
CO2: 25 MMOL/L (ref 21–32)
CREAT SERPL-MCNC: 0.7 MG/DL (ref 0.9–1.3)
GFR AFRICAN AMERICAN: >60
GFR NON-AFRICAN AMERICAN: >60
GLUCOSE BLD-MCNC: 129 MG/DL (ref 70–99)
GLUCOSE BLD-MCNC: 133 MG/DL (ref 70–99)
GLUCOSE BLD-MCNC: 137 MG/DL (ref 70–99)
GLUCOSE BLD-MCNC: 170 MG/DL (ref 70–99)
GLUCOSE BLD-MCNC: 176 MG/DL (ref 70–99)
GLUCOSE BLD-MCNC: 186 MG/DL (ref 70–99)
GLUCOSE BLD-MCNC: 197 MG/DL (ref 70–99)
PERFORMED ON: ABNORMAL
POTASSIUM REFLEX MAGNESIUM: 4.5 MMOL/L (ref 3.5–5.1)
SODIUM BLD-SCNC: 138 MMOL/L (ref 136–145)
VANCOMYCIN TROUGH: 18.5 UG/ML (ref 10–20)

## 2019-09-26 PROCEDURE — 3600000003 HC SURGERY LEVEL 3 BASE: Performed by: PODIATRIST

## 2019-09-26 PROCEDURE — 3700000000 HC ANESTHESIA ATTENDED CARE: Performed by: PODIATRIST

## 2019-09-26 PROCEDURE — 6370000000 HC RX 637 (ALT 250 FOR IP): Performed by: STUDENT IN AN ORGANIZED HEALTH CARE EDUCATION/TRAINING PROGRAM

## 2019-09-26 PROCEDURE — 2709999900 HC NON-CHARGEABLE SUPPLY: Performed by: PODIATRIST

## 2019-09-26 PROCEDURE — 80048 BASIC METABOLIC PNL TOTAL CA: CPT

## 2019-09-26 PROCEDURE — 88305 TISSUE EXAM BY PATHOLOGIST: CPT

## 2019-09-26 PROCEDURE — 7100000000 HC PACU RECOVERY - FIRST 15 MIN: Performed by: PODIATRIST

## 2019-09-26 PROCEDURE — 2500000003 HC RX 250 WO HCPCS: Performed by: PODIATRIST

## 2019-09-26 PROCEDURE — 88311 DECALCIFY TISSUE: CPT

## 2019-09-26 PROCEDURE — 7100000001 HC PACU RECOVERY - ADDTL 15 MIN: Performed by: PODIATRIST

## 2019-09-26 PROCEDURE — 87075 CULTR BACTERIA EXCEPT BLOOD: CPT

## 2019-09-26 PROCEDURE — 86900 BLOOD TYPING SEROLOGIC ABO: CPT

## 2019-09-26 PROCEDURE — 6360000002 HC RX W HCPCS: Performed by: INTERNAL MEDICINE

## 2019-09-26 PROCEDURE — 3600000013 HC SURGERY LEVEL 3 ADDTL 15MIN: Performed by: PODIATRIST

## 2019-09-26 PROCEDURE — 87186 SC STD MICRODIL/AGAR DIL: CPT

## 2019-09-26 PROCEDURE — 87070 CULTURE OTHR SPECIMN AEROBIC: CPT

## 2019-09-26 PROCEDURE — 99232 SBSQ HOSP IP/OBS MODERATE 35: CPT | Performed by: INTERNAL MEDICINE

## 2019-09-26 PROCEDURE — 80202 ASSAY OF VANCOMYCIN: CPT

## 2019-09-26 PROCEDURE — 2580000003 HC RX 258: Performed by: PODIATRIST

## 2019-09-26 PROCEDURE — 6360000002 HC RX W HCPCS: Performed by: STUDENT IN AN ORGANIZED HEALTH CARE EDUCATION/TRAINING PROGRAM

## 2019-09-26 PROCEDURE — 87077 CULTURE AEROBIC IDENTIFY: CPT

## 2019-09-26 PROCEDURE — 36415 COLL VENOUS BLD VENIPUNCTURE: CPT

## 2019-09-26 PROCEDURE — 86850 RBC ANTIBODY SCREEN: CPT

## 2019-09-26 PROCEDURE — 6370000000 HC RX 637 (ALT 250 FOR IP): Performed by: INTERNAL MEDICINE

## 2019-09-26 PROCEDURE — 87205 SMEAR GRAM STAIN: CPT

## 2019-09-26 PROCEDURE — 1200000000 HC SEMI PRIVATE

## 2019-09-26 PROCEDURE — 0Y6P0Z0 DETACHMENT AT RIGHT 1ST TOE, COMPLETE, OPEN APPROACH: ICD-10-PCS | Performed by: PODIATRIST

## 2019-09-26 PROCEDURE — 2580000003 HC RX 258: Performed by: STUDENT IN AN ORGANIZED HEALTH CARE EDUCATION/TRAINING PROGRAM

## 2019-09-26 PROCEDURE — 2500000003 HC RX 250 WO HCPCS: Performed by: STUDENT IN AN ORGANIZED HEALTH CARE EDUCATION/TRAINING PROGRAM

## 2019-09-26 PROCEDURE — 3700000001 HC ADD 15 MINUTES (ANESTHESIA): Performed by: PODIATRIST

## 2019-09-26 PROCEDURE — 86901 BLOOD TYPING SEROLOGIC RH(D): CPT

## 2019-09-26 PROCEDURE — 2580000003 HC RX 258: Performed by: INTERNAL MEDICINE

## 2019-09-26 PROCEDURE — 73630 X-RAY EXAM OF FOOT: CPT

## 2019-09-26 RX ORDER — MIDAZOLAM HYDROCHLORIDE 1 MG/ML
INJECTION INTRAMUSCULAR; INTRAVENOUS PRN
Status: DISCONTINUED | OUTPATIENT
Start: 2019-09-26 | End: 2019-09-26 | Stop reason: SDUPTHER

## 2019-09-26 RX ORDER — SUCCINYLCHOLINE CHLORIDE 20 MG/ML
INJECTION INTRAMUSCULAR; INTRAVENOUS PRN
Status: DISCONTINUED | OUTPATIENT
Start: 2019-09-26 | End: 2019-09-26 | Stop reason: SDUPTHER

## 2019-09-26 RX ORDER — GLYCOPYRROLATE 1 MG/5 ML
SYRINGE (ML) INTRAVENOUS PRN
Status: DISCONTINUED | OUTPATIENT
Start: 2019-09-26 | End: 2019-09-26 | Stop reason: SDUPTHER

## 2019-09-26 RX ORDER — ROCURONIUM BROMIDE 10 MG/ML
INJECTION, SOLUTION INTRAVENOUS PRN
Status: DISCONTINUED | OUTPATIENT
Start: 2019-09-26 | End: 2019-09-26 | Stop reason: SDUPTHER

## 2019-09-26 RX ORDER — LINEZOLID 2 MG/ML
600 INJECTION, SOLUTION INTRAVENOUS EVERY 12 HOURS SCHEDULED
Status: DISCONTINUED | OUTPATIENT
Start: 2019-09-26 | End: 2019-09-26 | Stop reason: ALTCHOICE

## 2019-09-26 RX ORDER — BUPIVACAINE HYDROCHLORIDE AND EPINEPHRINE 2.5; 5 MG/ML; UG/ML
INJECTION, SOLUTION EPIDURAL; INFILTRATION; INTRACAUDAL; PERINEURAL PRN
Status: DISCONTINUED | OUTPATIENT
Start: 2019-09-26 | End: 2019-09-26 | Stop reason: ALTCHOICE

## 2019-09-26 RX ORDER — FENTANYL CITRATE 50 UG/ML
INJECTION, SOLUTION INTRAMUSCULAR; INTRAVENOUS PRN
Status: DISCONTINUED | OUTPATIENT
Start: 2019-09-26 | End: 2019-09-26 | Stop reason: SDUPTHER

## 2019-09-26 RX ORDER — ONDANSETRON 2 MG/ML
INJECTION INTRAMUSCULAR; INTRAVENOUS PRN
Status: DISCONTINUED | OUTPATIENT
Start: 2019-09-26 | End: 2019-09-26 | Stop reason: SDUPTHER

## 2019-09-26 RX ORDER — SODIUM CHLORIDE, SODIUM LACTATE, POTASSIUM CHLORIDE, CALCIUM CHLORIDE 600; 310; 30; 20 MG/100ML; MG/100ML; MG/100ML; MG/100ML
INJECTION, SOLUTION INTRAVENOUS CONTINUOUS PRN
Status: DISCONTINUED | OUTPATIENT
Start: 2019-09-26 | End: 2019-09-26 | Stop reason: SDUPTHER

## 2019-09-26 RX ORDER — LIDOCAINE HYDROCHLORIDE 20 MG/ML
INJECTION, SOLUTION INTRAVENOUS PRN
Status: DISCONTINUED | OUTPATIENT
Start: 2019-09-26 | End: 2019-09-26 | Stop reason: SDUPTHER

## 2019-09-26 RX ORDER — MAGNESIUM HYDROXIDE 1200 MG/15ML
LIQUID ORAL CONTINUOUS PRN
Status: COMPLETED | OUTPATIENT
Start: 2019-09-26 | End: 2019-09-26

## 2019-09-26 RX ORDER — PROPOFOL 10 MG/ML
INJECTION, EMULSION INTRAVENOUS PRN
Status: DISCONTINUED | OUTPATIENT
Start: 2019-09-26 | End: 2019-09-26 | Stop reason: SDUPTHER

## 2019-09-26 RX ORDER — NEOSTIGMINE METHYLSULFATE 5 MG/5 ML
SYRINGE (ML) INTRAVENOUS PRN
Status: DISCONTINUED | OUTPATIENT
Start: 2019-09-26 | End: 2019-09-26 | Stop reason: SDUPTHER

## 2019-09-26 RX ORDER — SENNA PLUS 8.6 MG/1
1 TABLET ORAL 2 TIMES DAILY
Status: DISCONTINUED | OUTPATIENT
Start: 2019-09-26 | End: 2019-09-28 | Stop reason: HOSPADM

## 2019-09-26 RX ADMIN — LINEZOLID 600 MG: 600 INJECTION, SOLUTION INTRAVENOUS at 10:37

## 2019-09-26 RX ADMIN — VORTIOXETINE 20 MG: 10 TABLET, FILM COATED ORAL at 08:25

## 2019-09-26 RX ADMIN — INSULIN LISPRO 1 UNITS: 100 INJECTION, SOLUTION INTRAVENOUS; SUBCUTANEOUS at 23:26

## 2019-09-26 RX ADMIN — MIDAZOLAM HYDROCHLORIDE 2 MG: 2 INJECTION, SOLUTION INTRAMUSCULAR; INTRAVENOUS at 17:13

## 2019-09-26 RX ADMIN — BUSPIRONE HYDROCHLORIDE 7.5 MG: 5 TABLET ORAL at 08:25

## 2019-09-26 RX ADMIN — SENNOSIDES 17.2 MG: 8.6 TABLET, FILM COATED ORAL at 08:25

## 2019-09-26 RX ADMIN — ATORVASTATIN CALCIUM 40 MG: 40 TABLET, FILM COATED ORAL at 08:25

## 2019-09-26 RX ADMIN — COLLAGENASE SANTYL: 250 OINTMENT TOPICAL at 08:28

## 2019-09-26 RX ADMIN — PIPERACILLIN SODIUM,TAZOBACTAM SODIUM 3.38 G: 3; .375 INJECTION, POWDER, FOR SOLUTION INTRAVENOUS at 06:33

## 2019-09-26 RX ADMIN — SUCCINYLCHOLINE CHLORIDE 180 MG: 20 INJECTION, SOLUTION INTRAMUSCULAR; INTRAVENOUS; PARENTERAL at 17:15

## 2019-09-26 RX ADMIN — BUSPIRONE HYDROCHLORIDE 7.5 MG: 5 TABLET ORAL at 22:41

## 2019-09-26 RX ADMIN — PROPOFOL 200 MG: 10 INJECTION, EMULSION INTRAVENOUS at 17:15

## 2019-09-26 RX ADMIN — INSULIN LISPRO 30 UNITS: 100 INJECTION, SOLUTION INTRAVENOUS; SUBCUTANEOUS at 08:30

## 2019-09-26 RX ADMIN — BUPRENORPHINE AND NALOXONE 3 TABLET: 2; .5 TABLET SUBLINGUAL at 10:36

## 2019-09-26 RX ADMIN — SODIUM CHLORIDE: 9 INJECTION, SOLUTION INTRAVENOUS at 14:37

## 2019-09-26 RX ADMIN — ACETAMINOPHEN 650 MG: 325 TABLET ORAL at 22:41

## 2019-09-26 RX ADMIN — VITAMIN D 2000 UNITS: 25 TAB ORAL at 08:25

## 2019-09-26 RX ADMIN — ROCURONIUM BROMIDE 50 MG: 10 INJECTION, SOLUTION INTRAVENOUS at 17:20

## 2019-09-26 RX ADMIN — INSULIN LISPRO 2 UNITS: 100 INJECTION, SOLUTION INTRAVENOUS; SUBCUTANEOUS at 12:33

## 2019-09-26 RX ADMIN — LIDOCAINE HYDROCHLORIDE 100 MG: 20 INJECTION, SOLUTION INTRAVENOUS at 17:15

## 2019-09-26 RX ADMIN — Medication 0.4 MG: at 17:53

## 2019-09-26 RX ADMIN — INSULIN LISPRO 2 UNITS: 100 INJECTION, SOLUTION INTRAVENOUS; SUBCUTANEOUS at 08:29

## 2019-09-26 RX ADMIN — ONDANSETRON 4 MG: 2 INJECTION INTRAMUSCULAR; INTRAVENOUS at 17:49

## 2019-09-26 RX ADMIN — SENNOSIDES 8.6 MG: 8.6 TABLET, FILM COATED ORAL at 22:41

## 2019-09-26 RX ADMIN — FENTANYL CITRATE 100 MCG: 50 INJECTION INTRAMUSCULAR; INTRAVENOUS at 17:15

## 2019-09-26 RX ADMIN — VANCOMYCIN HYDROCHLORIDE 1500 MG: 10 INJECTION, POWDER, LYOPHILIZED, FOR SOLUTION INTRAVENOUS at 00:02

## 2019-09-26 RX ADMIN — PIPERACILLIN SODIUM,TAZOBACTAM SODIUM 3.38 G: 3; .375 INJECTION, POWDER, FOR SOLUTION INTRAVENOUS at 17:00

## 2019-09-26 RX ADMIN — PIPERACILLIN SODIUM,TAZOBACTAM SODIUM 3.38 G: 3; .375 INJECTION, POWDER, FOR SOLUTION INTRAVENOUS at 14:33

## 2019-09-26 RX ADMIN — Medication 3 MG: at 17:53

## 2019-09-26 RX ADMIN — SODIUM CHLORIDE, SODIUM LACTATE, POTASSIUM CHLORIDE, AND CALCIUM CHLORIDE: 600; 310; 30; 20 INJECTION, SOLUTION INTRAVENOUS at 17:12

## 2019-09-26 RX ADMIN — LISINOPRIL 40 MG: 40 TABLET ORAL at 08:25

## 2019-09-26 ASSESSMENT — PULMONARY FUNCTION TESTS
PIF_VALUE: 22
PIF_VALUE: 22
PIF_VALUE: 0
PIF_VALUE: 23
PIF_VALUE: 1
PIF_VALUE: 24
PIF_VALUE: 3
PIF_VALUE: 22
PIF_VALUE: 22
PIF_VALUE: 21
PIF_VALUE: 6
PIF_VALUE: 0
PIF_VALUE: 2
PIF_VALUE: 22
PIF_VALUE: 21
PIF_VALUE: 22
PIF_VALUE: 22
PIF_VALUE: 2
PIF_VALUE: 22
PIF_VALUE: 22
PIF_VALUE: 21
PIF_VALUE: 22
PIF_VALUE: 13
PIF_VALUE: 22
PIF_VALUE: 0
PIF_VALUE: 21
PIF_VALUE: 13
PIF_VALUE: 22
PIF_VALUE: 0
PIF_VALUE: 22
PIF_VALUE: 0
PIF_VALUE: 22
PIF_VALUE: 22
PIF_VALUE: 21

## 2019-09-26 ASSESSMENT — PAIN DESCRIPTION - ORIENTATION
ORIENTATION: RIGHT
ORIENTATION: RIGHT

## 2019-09-26 ASSESSMENT — PAIN SCALES - GENERAL
PAINLEVEL_OUTOF10: 0
PAINLEVEL_OUTOF10: 0
PAINLEVEL_OUTOF10: 2
PAINLEVEL_OUTOF10: 2
PAINLEVEL_OUTOF10: 0

## 2019-09-26 ASSESSMENT — PAIN - FUNCTIONAL ASSESSMENT: PAIN_FUNCTIONAL_ASSESSMENT: ACTIVITIES ARE NOT PREVENTED

## 2019-09-26 ASSESSMENT — PAIN DESCRIPTION - LOCATION
LOCATION: FOOT
LOCATION: FOOT

## 2019-09-26 ASSESSMENT — PAIN DESCRIPTION - DESCRIPTORS: DESCRIPTORS: ACHING

## 2019-09-26 ASSESSMENT — PAIN DESCRIPTION - PAIN TYPE
TYPE: SURGICAL PAIN
TYPE: SURGICAL PAIN

## 2019-09-26 ASSESSMENT — PAIN DESCRIPTION - FREQUENCY: FREQUENCY: INTERMITTENT

## 2019-09-26 ASSESSMENT — PAIN DESCRIPTION - PROGRESSION: CLINICAL_PROGRESSION: GRADUALLY WORSENING

## 2019-09-26 ASSESSMENT — PAIN DESCRIPTION - ONSET: ONSET: GRADUAL

## 2019-09-26 NOTE — PROGRESS NOTES
%  Max: 100 %  24HR INTAKE/OUTPUT:      Intake/Output Summary (Last 24 hours) at 9/26/2019 0916  Last data filed at 9/26/2019 0543  Gross per 24 hour   Intake 3675 ml   Output 2500 ml   Net 1175 ml       Exam:      General appearance: No apparent distress, appears stated age and cooperative. Lungs: Clear to ascultation, bilaterally without Rales/Wheezes/Rhonchi with good respiratory effort. Heart: Regular rate and rhythm with Normal S1/S2 without  murmurs, rubs or gallops, point of maximum impulse non-displaced  Abdomen: Soft, non-tender or non-distended without rigidity or guarding and positive bowel sounds all four quadrants. Extremities: No clubbing, cyanosis, or edema bilaterally. R foot is covered in dressing  Skin: Skin color, texture, turgor normal.  Pictures reviewed  Neurologic: Alert and oriented X 3,   grossly non-focal.  Mental status: Alert, oriented, thought content appropriate. Data    Recent Labs     09/24/19  1429 09/25/19  0530 09/25/19  1217   WBC 9.4 7.5 8.0   HGB 13.1* 11.2* 11.4*   HCT 39.6* 34.1* 34.6*    247 262      Recent Labs     09/24/19  1429 09/25/19  0530 09/26/19  0615    137 138   K 4.5 4.0 4.5    102 103   CO2 22 25 25   BUN 19 14 13   CREATININE 0.9 0.7* 0.7*     No results for input(s): AST, ALT, ALB, BILIDIR, BILITOT, ALKPHOS in the last 72 hours. Recent Labs     09/25/19  0530   INR 1.00     No results for input(s): CKTOTAL, CKMB, CKMBINDEX, TROPONINI in the last 72 hours.     Consults:     IP CONSULT TO PODIATRY  PHARMACY TO DOSE VANCOMYCIN  IP CONSULT TO HOSPITALIST  IP CONSULT TO INFECTIOUS DISEASES  IP CONSULT TO PHARMACY    Active Hospital Problems    Diagnosis Date Noted    Diabetic polyneuropathy ([facility] [E11.42] 09/25/2019    Toe necrosis ([facility] Devere Radha 09/24/2019    Diabetic foot infection ([facility] [S98.614, L08.9] 02/20/2018         ASSESSMENT AND PLAN      Diabetic foot infection and dry gangrene R hallux:  Cont with empiric zosyn and zyvox per

## 2019-09-26 NOTE — CARE COORDINATION
CM following, pt will go to OR today, ID recs pending for abx post surgery. Will cont to follow.    Electronically signed by Nelson Sánchez RN on 9/26/2019 at 12:45 PM  [phone]

## 2019-09-26 NOTE — PROGRESS NOTES
Patient sent down to the OR via bed around 1650. Patient was alert and oriented x 4. Patient denies any pain at this time. Consent was placed in the front of the chart. Patient had been kept NPO per doctor orders. Patient belongings placed in the closet for safe keeping. Will await patient return to the floor.

## 2019-09-26 NOTE — PROGRESS NOTES
Nutrition Assessment (Low Risk)    Type and Reason for Visit: Initial, Positive Nutrition Screen(wounds)    Nutrition Recommendations:   · Continue carb controlled diet  · Monitor pt need/acceptance of diet education   · Monitor nutrition adequacy, pertinent labs, bowel habits, wt changes, and clinical progress    Nutrition Assessment:  Patient assessed for nutritional risk. Deemed to be at low risk at this time. Will continue to monitor for changes in status. Pt admitted for diabetic foot infection w/OR scheduled for toe amputation today. Pt voices wt loss d/t recent stress at home but endorses a good appetite w/wt ranges between 340-350 lbs. Discussed diet education. Pt states that he knows what to do and can verbalize carbohydrate counting, but does not follow diet. Denied the need for further education/carb counting handout. Pt vocies seeing an outpatient diabetes educator. Encouraged protein for wound healing, diet compliance, and continuing outpatient sessions. Will continue to monitor. Malnutrition Assessment:  · Malnutrition Status:  At risk for malnutrition    Nutrition Risk Level   Risk Level: Low    Nutrition Diagnosis:   · Problem: Altered nutrition-related lab values  · Etiology: Endocrine dysfunction    Signs and symptoms: Lab values, Presence of wounds(A1c of 10.4%, Pt report of non-compliance to diet.)    Nutrition Intervention:  Food and/or Delivery: Continue NPO(resume diet when medically appropriate per MD)  Nutrition Education/Counseling/Coordination of Care:  Continued Inpatient Monitoring, Education declined      Electronically signed by Scarlett Cardona RD, MAVIS on 9/26/19 at 10:20 AM    Contact Number: [phone]

## 2019-09-26 NOTE — ANESTHESIA PRE PROCEDURE
Department of Anesthesiology  Preprocedure Note       Name:  Marika George   Age:  37 y.o.  :  1980                                          MRN:  [id number]         Date:  2019      Surgeon: Bisi Mccracken):  Annika Fernandez DPM    Procedure: RIGHT HALLUX AMPUTATION WITH POSSIBLE 1ST RAY AMPUTATION (Right )    Medications prior to admission:   Prior to Admission medications    Medication Sig Start Date End Date Taking? Authorizing Provider   naproxen (NAPROSYN) 500 MG tablet Take 500 mg by mouth 2 times daily (with meals)   Yes Historical Provider, MD   VORTIoxetine HBr (TRINTELLIX) 20 MG TABS tablet Take 20 mg by mouth daily   Yes Historical Provider, MD   busPIRone (BUSPAR) 15 MG tablet Take 7.5 mg by mouth 2 times daily   Yes Historical Provider, MD   hydrochlorothiazide (MICROZIDE) 12.5 MG capsule Take 12.5 mg by mouth daily   Yes Historical Provider, MD   Ertugliflozin L-PyroglutamicAc (STEGLATRO) 5 MG TABS Take 5 mg by mouth daily   Yes Historical Provider, MD   senna (SENOKOT) 8.6 MG tablet Take 2 tablets by mouth daily   Yes Historical Provider, MD   Cholecalciferol (VITAMIN D3) 2000 units CAPS Take by mouth   Yes Historical Provider, MD   insulin glargine (LANTUS) 100 UNIT/ML injection pen Inject 40 Units into the skin nightly  Patient taking differently: Inject 30 Units into the skin 2 times daily  18  Yes Kwame Ye MD   atorvastatin (LIPITOR) 40 MG tablet Take 40 mg by mouth daily   Yes Historical Provider, MD   buprenorphine-naloxone (SUBOXONE) 8-2 MG SUBL SL tablet Place 1 tablet under the tongue daily.     Yes Historical Provider, MD   insulin lispro (HUMALOG) 100 UNIT/ML injection vial Inject 30 Units into the skin 3 times daily (before meals) May add insulin depending on sugar/   Yes Historical Provider, MD   lisinopril (PRINIVIL;ZESTRIL) 40 MG tablet Take 40 mg by mouth daily   Yes Historical Provider, MD       Current medications:    Current Facility-Administered Medications extended infusion (mini-bag)  3.375 g Intravenous Q8H Dash Lopez MD 25 mL/hr at 09/26/19 1433 3.375 g at 09/26/19 1433    0.9 % sodium chloride infusion   Intravenous Continuous Dash Lopez  mL/hr at 09/26/19 1437      glucose (GLUTOSE) 40 % oral gel 15 g  15 g Oral PRN Dash Lopez MD        dextrose 50 % IV solution  12.5 g Intravenous PRN Dash Lopez MD        glucagon (rDNA) injection 1 mg  1 mg Intramuscular PRN Dash Lopez MD        dextrose 5 % solution  100 mL/hr Intravenous PRN Dash Lopez MD        insulin lispro (HUMALOG) injection pen 0-12 Units  0-12 Units Subcutaneous TID WC Dash Lopez MD   2 Units at 09/26/19 1233    insulin lispro (HUMALOG) injection pen 0-6 Units  0-6 Units Subcutaneous Nightly Dash Lopez MD   2 Units at 09/25/19 2156       Allergies: Allergies   Allergen Reactions    Cephalexin Shortness Of Breath       Problem List:    Patient Active Problem List   Diagnosis Code    Gangrene of toe of left foot ([facility] I96    Type 2 diabetes mellitus ([facility] E11.9    Essential hypertension I10    Obesity, Class III, BMI 40-49.9 (morbid obesity) ([facility] E66.01    Diabetic foot infection (HCC) E11.628, L08.9    Toe osteomyelitis, left ([facility]) M86.9    Toe necrosis ([facility]) I96    Diabetic polyneuropathy ([facility]) E11.42       Past Medical History:        Diagnosis Date    Depression     Diabetes mellitus ([facility]     Hyperlipidemia     Hypertension     Osteoarthritis of both knees        Past Surgical History:        Procedure Laterality Date    FOOT SURGERY Left 02/19/2018     INCISION AND DRAINAGE WITH HALLUX AMPUTATION LEFT FOOT    KNEE SURGERY Right 6/19/2013    TOE AMPUTATION Left     2nd toe       Social History:    Social History     Tobacco Use    Smoking status: Never Smoker    Smokeless tobacco: Never Used   Substance Use Topics    Alcohol use:  No                                Counseling given: Not Answered      Vital Signs (Current):   Vitals: 09/26/19 0415 09/26/19 0824 09/26/19 1114 09/26/19 1502   BP: 122/75 (!) 160/104 130/79 125/76   Pulse: 71 87 81 76   Resp: 16 16 16 16   Temp: 97.7 °F (36.5 °C) 97.3 °F (36.3 °C) 97.9 °F (36.6 °C) 98 °F (36.7 °C)   TempSrc: Oral Oral Oral Oral   SpO2: 100% 100% 96% 96%   Weight:       Height:                                                  BP Readings from Last 3 Encounters:   09/26/19 125/76   03/21/18 134/88   02/28/18 123/86       NPO Status:                                                                                 BMI:   Wt Readings from Last 3 Encounters:   09/24/19 (!) 345 lb (156.5 kg)   02/18/18 (!) 325 lb (147.4 kg)   08/14/17 (!) 323 lb (146.5 kg)     Body mass index is 43.12 kg/m².     CBC:   Lab Results   Component Value Date    WBC 8.0 09/25/2019    RBC 4.11 09/25/2019    HGB 11.4 09/25/2019    HCT 34.6 09/25/2019    MCV 84.3 09/25/2019    RDW 13.8 09/25/2019     09/25/2019       CMP:   Lab Results   Component Value Date     09/26/2019    K 4.5 09/26/2019     09/26/2019    CO2 25 09/26/2019    BUN 13 09/26/2019    CREATININE 0.7 09/26/2019    GFRAA >60 09/26/2019    AGRATIO 1.3 02/18/2018    LABGLOM >60 09/26/2019    GLUCOSE 197 09/26/2019    PROT 7.0 02/18/2018    CALCIUM 8.8 09/26/2019    BILITOT 0.3 02/18/2018    ALKPHOS 83 02/18/2018    AST 12 02/18/2018    ALT 14 02/18/2018       POC Tests:   Recent Labs     09/26/19  1618   POCGLU 133*       Coags:   Lab Results   Component Value Date    PROTIME 11.4 09/25/2019    INR 1.00 09/25/2019       HCG (If Applicable): No results found for: PREGTESTUR, PREGSERUM, HCG, HCGQUANT     ABGs: No results found for: PHART, PO2ART, XKW7MJQ, EJK4HTX, BEART, W3QIPTGZ     Type & Screen (If Applicable):  No results found for: LABABO, [address]    Anesthesia Evaluation  Patient summary reviewed and Nursing notes reviewed no history of anesthetic complications:   Airway: Mallampati: II  TM distance: >3 FB   Neck ROM: full  Mouth opening: > = 3 FB Dental:          Pulmonary:Negative Pulmonary ROS                              Cardiovascular:    (+) hypertension:,                   Neuro/Psych:   (+) psychiatric history:depression/anxiety             GI/Hepatic/Renal: Neg GI/Hepatic/Renal ROS            Endo/Other:    (+) DiabetesType II DM, , .                 Abdominal:           Vascular:                                        Anesthesia Plan      general     ASA 1    (77-year-old male presents for RIGHT HALLUX AMPUTATION WITH POSSIBLE 1ST RAY AMPUTATION (Right ). Plan general anesthesia with ASA standard monitors. Questions answered. Patient agreeable with anesthetic plan.  )  Induction: intravenous. Anesthetic plan and risks discussed with patient. Plan discussed with CRNA.     Attending anesthesiologist reviewed and agrees with Pre Eval content        Gisela Paredes MD   9/26/2019

## 2019-09-26 NOTE — BRIEF OP NOTE
Brief Postoperative Note  ______________________________________________________________    Patient: Marika George  YOB: 1980  MRN: [id number]  Date of Procedure: 9/26/2019    Pre-Op Diagnosis: GANGRENE RIGHT HALLUX    Post-Op Diagnosis: Same       Procedure(s):  RIGHT HALLUX AMPUTATION   Anesthesia: Anesthesia type not filed in the log. Surgeon(s):  Annika Fernandez DPM     Assistant(s):Marvin Thurston Ms4    Injectables: 10 cc of 0.5% marcaine plain, pre op     Hemostasis: anatomic dissection    Materials:   3-0 vicryl, 4-0 nylon      Estimated Blood Loss (mL): <26 cc    Complications: None    Specimens:   ID Type Source Tests Collected by Time Destination   1 : 1. RIGHT HALLUX CULTURE Specimen Toe SURGICAL CULTURE Annika Fernandez DPM 9/26/2019 1735    A : a. right hallux Tissue Tissue SURGICAL PATHOLOGY, TISSUE CULTURE Annika Fernandez DPM 9/26/2019 1733        Implants:  * No implants in log *      Drains: * No LDAs found *    Findings: Necrotic hallux amputated. Cartilage to the metatarsal head white and shiny with no signs of cortical destruction or soft bone noted. No purulent drainage noted. DISPO: s/p R hallux amputation. Heel weightbearing with post op shoe for transfer only. Patient OK for discharge from podiatry standpoint.      Niranjan Bean DPM  Date: 9/26/2019  Time: 5:58 PM

## 2019-09-26 NOTE — PROGRESS NOTES
Clinical Pharmacy Progress Note    Admit date: 9/24/2019     Subjective/Objective:  Pt is a 43yom with PMHx that includes HTN, HLD, diabetes, and prior left hallux amputation who is admitted with B/L LE wounds and Rt hallux dry gangrene. Interval update:  Planning for likely right hallux amputation today. Vascular studies pending. Pharmacy is consulted to dose Vancomycin per Dr. Emelyn Andrade    Current antibiotics:  Zosyn 3.375g IV EI q8h - day #3  Vancomycin - Pharmacy to dose - day #3   2g IV x1 9/24 15:30   1.5g IV q8h (9/24 - current)    Prior experience with Vancomycin dosing in this patient:  · June 2015 - admitted at [facility]. Per review of their records, patient was on 1.75g IV q8h, which resulted in a trough of 7.2mcg/mL. SCr 0.7-0.8 at that time. · Feb 2018 - admitted here at [facility]. Started on 2g IV q8h, but Vancomycin discontinued prior to checking a trough. Recent Labs     09/25/19  0530 09/26/19  0615    138   K 4.0 4.5    103   CO2 25 25   BUN 14 13   CREATININE 0.7* 0.7*   GLUCOSE 199* 197*       Est CrCl > 120mL/min    Lab Results   Component Value Date    WBC 8.0 09/25/2019    HGB 11.4 (L) 09/25/2019    HCT 34.6 (L) 09/25/2019    MCV 84.3 09/25/2019     09/25/2019       Lab Results   Component Value Date    PROTIME 11.4 09/25/2019    INR 1.00 09/25/2019       Height:  6' 3\" (190.5 cm)  Weight:  Weight: (!) 345 lb (156.5 kg)    Vancomycin Levels:  Trough  9/26 @ 06:15 = 18.5mcg/mL (drawn ~6 hr after prior dose, on 1.5g IV q8h)    Culture results:  None available    Prophylaxis:  VTE:  Enoxaparin - on hold for procedure  GI:  Not indicated    Vaccination screening:  Pneumonia:  Up to date  Influenza:  Ordered to be given 1/26 per policy    Assessment/Plan:  1)  B/L LE wounds and Rt hallux dry gangrene:  Zosyn + Vancomycin - day #3  · Vancomycin - Pharmacy to dose  · Trough this AM = 18.5mcg/mL - slightly higher than expected, but drawn only 6 hr after prior dose.   Actual 8-hr trough

## 2019-09-26 NOTE — OP NOTE
OPERATIVE NOTE     Patient: Denny Zelaya  YOB: 1980  MRN: [id number]  Date of Procedure: 9/26/2019    Pre-Op Diagnosis: GANGRENE RIGHT HALLUX    Post-Op Diagnosis: Same       Procedure(s):  RIGHT HALLUX AMPUTATION   Anesthesia: Anesthesia type not filed in the log. Surgeon(s):  Tess Jhaveri DPM     Assistant(s):Elizabeth Jeanie Reding, Aniceto Boast Ms4    Injectables: 10 cc of 0.5% marcaine plain, pre op     Hemostasis: anatomic dissection    Materials:   3-0 vicryl, 4-0 nylon      Estimated Blood Loss (mL): 20 cc    Complications: None    Specimens:   ID Type Source Tests Collected by Time Destination   1 : 1. RIGHT HALLUX CULTURE Specimen Toe SURGICAL CULTURE Tess Jhaveri DPM 9/26/2019 1735    A : a. right hallux Tissue Tissue SURGICAL PATHOLOGY, TISSUE CULTURE Tess Jhaveri DPM 9/26/2019 1733        Implants:  * No implants in log *      Drains: * No LDAs found *    Findings: Necrotic hallux noted. INDICATIONS FOR PROCEDURE: 37year old male presents to hospital and was admitted for right necrotic hallux. At this time his tissue is not viable; therefore, it was decided it ws best to proceed with a right hallux amputation. This patient has signs and symptoms clinically  consistent with the above mentioned preoperative diagnosis. All potential risks, benefits, and complications were discussed with the patient prior to the scheduling of surgery. All the patient's questions were answered and no guarantees were given. The patient wished to proceed with surgery, and informed written consent was obtained. DETAILS OF PROCEDURE: The patient was brought from the pre-operative area and placed on the operating table in the supine position. Following IV sedation, a local anesthetic block was then injected proximal to the incision site. The right  lower extremity was then scrubbed, prepped, and draped in the usual sterile fashion. A time-out was performed.  The patient, procedure, and operative site were confirmed. PROCEDURE:   Attention was directed towards the hallux of the right foot. The toe was noted to be very necrotic with viable tissue, thus necessitating the need for wound debridement of soft tissue and infected bone. Using a #15 blade, a full thickness modified fish mouth incision was made on the dorsal aspect of the hallux and extended to encompass the necrotic portions of the soft tissues. The incision was carried down through the subcutaneous tissues using sharp dissection. All bleeders were electrocauterized along the way. The 1st metatarsophalangeal joint was identified and its soft tissue attachments were severed medially, laterally, dorsally, and plantarly using sharp dissection and the hallux was disarticulated and passed to back table as a specimen for pathology. Attention was then directed to the head of the 1st metatarsal. The cartilage was noted to white, shiny, pearly and hard, all which are consistent with healthy cartilage. It was determined that no further resection was necessary. No purulence was noted in this surgical wound. After inspection of the surgical wound there was no other signs of the infection tracking. Next, pulse lavage was performed using 3000 mL sterile saline with gentamycin. No additionally purulence was noticed following irrigation. The resulting skin flap left from the careful dissection appears that it will allow for primary closure without need for further bone resection. The wound was granular with no further necrotic tissue noted. At this time the, subcutaneous tissue was closed using 3-0 vicryl. The skin flap was closed using 4-0 nylon. Good capillary refill time to the flap edges noted and no signs of blanching or venous congestion noted. A soft sterile dressing was then applied. End of procedure: The patient tolerated the procedure and anesthesia well.   The patient was transported from the OR to the PACU with vital

## 2019-09-26 NOTE — PROGRESS NOTES
digit amputation noted to the left foot.    NEURO: No focal neurologic findings  PSYCH: Orientation, sensorium, mood normal  LINES:  Peripheral iv     Data Review:  Lab Results   Component Value Date    WBC 8.0 09/25/2019    HGB 11.4 (L) 09/25/2019    HCT 34.6 (L) 09/25/2019    MCV 84.3 09/25/2019     09/25/2019     Lab Results   Component Value Date    CREATININE 0.7 (L) 09/26/2019    BUN 13 09/26/2019     09/26/2019    K 4.5 09/26/2019     09/26/2019    CO2 25 09/26/2019     Hepatic Function Panel:   Lab Results   Component Value Date    ALKPHOS 83 02/18/2018    ALT 14 02/18/2018    AST 12 02/18/2018    PROT 7.0 02/18/2018    BILITOT 0.3 02/18/2018    LABALBU 4.0 02/18/2018     MICRO:  N/A    IMAGING:  VL DUP LOWER EXTREMITY ARTERIES BILATERAL   Final Result      XR TOE RIGHT (MIN 2 VIEWS)   Final Result      Limited evaluation due to overlying artifact      No fracture      If concern for acute injury splinting with follow-up recommended      XR FOOT RIGHT (MIN 3 VIEWS)   Final Result      No acute bony pathology         Scheduled Meds:   influenza virus vaccine  0.5 mL Intramuscular Once    insulin glargine  20 Units Subcutaneous BID    collagenase   Topical Daily    buprenorphine-naloxone  3 tablet Sublingual Daily    atorvastatin  40 mg Oral Daily    busPIRone  7.5 mg Oral BID    vitamin D  2,000 Units Oral Daily    insulin lispro  30 Units Subcutaneous TID AC    lisinopril  40 mg Oral Daily    senna  2 tablet Oral Daily    VORTIoxetine HBr  20 mg Oral Daily    sodium chloride flush  10 mL Intravenous 2 times per day    piperacillin-tazobactam  3.375 g Intravenous Q8H    vancomycin  1,500 mg Intravenous Q8H    insulin lispro  0-12 Units Subcutaneous TID WC    insulin lispro  0-6 Units Subcutaneous Nightly     Continuous Infusions:   sodium chloride 100 mL/hr at 09/25/19 1451    dextrose       PRN Meds:  sodium chloride flush, magnesium hydroxide, ondansetron, acetaminophen,

## 2019-09-26 NOTE — PLAN OF CARE
Problem: Falls - Risk of:  Goal: Will remain free from falls  Description  Will remain free from falls  9/26/2019 0956 by Roseline Moran RN  Note:   Patient is alert and oriented x 4. Patient identified as a low fall risk. Patient up with minimal assistance and surgical shoes to both feet when needing to be up and ambulating. Patient remains free of falls at this time. Call light within reach and will continue to monitor the patient.   9/26/2019 0545 by Lianne Angelo RN  Outcome: Ongoing

## 2019-09-27 LAB
ANION GAP SERPL CALCULATED.3IONS-SCNC: 7 MMOL/L (ref 3–16)
BUN BLDV-MCNC: 12 MG/DL (ref 7–20)
CALCIUM SERPL-MCNC: 8.4 MG/DL (ref 8.3–10.6)
CHLORIDE BLD-SCNC: 107 MMOL/L (ref 99–110)
CO2: 26 MMOL/L (ref 21–32)
CREAT SERPL-MCNC: 0.8 MG/DL (ref 0.9–1.3)
GFR AFRICAN AMERICAN: >60
GFR NON-AFRICAN AMERICAN: >60
GLUCOSE BLD-MCNC: 123 MG/DL (ref 70–99)
GLUCOSE BLD-MCNC: 145 MG/DL (ref 70–99)
GLUCOSE BLD-MCNC: 154 MG/DL (ref 70–99)
GLUCOSE BLD-MCNC: 189 MG/DL (ref 70–99)
GLUCOSE BLD-MCNC: 203 MG/DL (ref 70–99)
HCT VFR BLD CALC: 33.3 % (ref 40.5–52.5)
HEMOGLOBIN: 10.9 G/DL (ref 13.5–17.5)
MCH RBC QN AUTO: 27.6 PG (ref 26–34)
MCHC RBC AUTO-ENTMCNC: 32.6 G/DL (ref 31–36)
MCV RBC AUTO: 84.5 FL (ref 80–100)
PDW BLD-RTO: 14.3 % (ref 12.4–15.4)
PERFORMED ON: ABNORMAL
PLATELET # BLD: 272 K/UL (ref 135–450)
PMV BLD AUTO: 7.4 FL (ref 5–10.5)
POTASSIUM REFLEX MAGNESIUM: 4.2 MMOL/L (ref 3.5–5.1)
RBC # BLD: 3.94 M/UL (ref 4.2–5.9)
SODIUM BLD-SCNC: 140 MMOL/L (ref 136–145)
WBC # BLD: 7.8 K/UL (ref 4–11)

## 2019-09-27 PROCEDURE — 6370000000 HC RX 637 (ALT 250 FOR IP): Performed by: STUDENT IN AN ORGANIZED HEALTH CARE EDUCATION/TRAINING PROGRAM

## 2019-09-27 PROCEDURE — 2580000003 HC RX 258: Performed by: STUDENT IN AN ORGANIZED HEALTH CARE EDUCATION/TRAINING PROGRAM

## 2019-09-27 PROCEDURE — 6370000000 HC RX 637 (ALT 250 FOR IP): Performed by: INTERNAL MEDICINE

## 2019-09-27 PROCEDURE — 90686 IIV4 VACC NO PRSV 0.5 ML IM: CPT | Performed by: STUDENT IN AN ORGANIZED HEALTH CARE EDUCATION/TRAINING PROGRAM

## 2019-09-27 PROCEDURE — 6360000002 HC RX W HCPCS: Performed by: STUDENT IN AN ORGANIZED HEALTH CARE EDUCATION/TRAINING PROGRAM

## 2019-09-27 PROCEDURE — 85027 COMPLETE CBC AUTOMATED: CPT

## 2019-09-27 PROCEDURE — G0008 ADMIN INFLUENZA VIRUS VAC: HCPCS | Performed by: STUDENT IN AN ORGANIZED HEALTH CARE EDUCATION/TRAINING PROGRAM

## 2019-09-27 PROCEDURE — 80048 BASIC METABOLIC PNL TOTAL CA: CPT

## 2019-09-27 PROCEDURE — 36415 COLL VENOUS BLD VENIPUNCTURE: CPT

## 2019-09-27 PROCEDURE — 1200000000 HC SEMI PRIVATE

## 2019-09-27 PROCEDURE — 99232 SBSQ HOSP IP/OBS MODERATE 35: CPT | Performed by: INTERNAL MEDICINE

## 2019-09-27 RX ORDER — IBUPROFEN 200 MG
400 TABLET ORAL EVERY 6 HOURS PRN
Status: DISCONTINUED | OUTPATIENT
Start: 2019-09-27 | End: 2019-09-28 | Stop reason: HOSPADM

## 2019-09-27 RX ORDER — SULFAMETHOXAZOLE AND TRIMETHOPRIM 800; 160 MG/1; MG/1
1 TABLET ORAL 2 TIMES DAILY
Qty: 28 TABLET | Refills: 0 | Status: SHIPPED | OUTPATIENT
Start: 2019-09-27 | End: 2019-10-11

## 2019-09-27 RX ADMIN — SENNOSIDES 8.6 MG: 8.6 TABLET, FILM COATED ORAL at 08:29

## 2019-09-27 RX ADMIN — PIPERACILLIN SODIUM,TAZOBACTAM SODIUM 3.38 G: 3; .375 INJECTION, POWDER, FOR SOLUTION INTRAVENOUS at 08:30

## 2019-09-27 RX ADMIN — INSULIN LISPRO 30 UNITS: 100 INJECTION, SOLUTION INTRAVENOUS; SUBCUTANEOUS at 17:47

## 2019-09-27 RX ADMIN — COLLAGENASE SANTYL: 250 OINTMENT TOPICAL at 08:32

## 2019-09-27 RX ADMIN — Medication 10 ML: at 20:46

## 2019-09-27 RX ADMIN — PIPERACILLIN SODIUM,TAZOBACTAM SODIUM 3.38 G: 3; .375 INJECTION, POWDER, FOR SOLUTION INTRAVENOUS at 01:40

## 2019-09-27 RX ADMIN — INSULIN LISPRO 30 UNITS: 100 INJECTION, SOLUTION INTRAVENOUS; SUBCUTANEOUS at 12:39

## 2019-09-27 RX ADMIN — INSULIN LISPRO 1 UNITS: 100 INJECTION, SOLUTION INTRAVENOUS; SUBCUTANEOUS at 20:37

## 2019-09-27 RX ADMIN — BUSPIRONE HYDROCHLORIDE 7.5 MG: 5 TABLET ORAL at 20:37

## 2019-09-27 RX ADMIN — ENOXAPARIN SODIUM 40 MG: 40 INJECTION SUBCUTANEOUS at 08:32

## 2019-09-27 RX ADMIN — INSULIN LISPRO 2 UNITS: 100 INJECTION, SOLUTION INTRAVENOUS; SUBCUTANEOUS at 12:38

## 2019-09-27 RX ADMIN — VITAMIN D 2000 UNITS: 25 TAB ORAL at 08:29

## 2019-09-27 RX ADMIN — LISINOPRIL 40 MG: 40 TABLET ORAL at 08:29

## 2019-09-27 RX ADMIN — BUSPIRONE HYDROCHLORIDE 7.5 MG: 5 TABLET ORAL at 08:29

## 2019-09-27 RX ADMIN — IBUPROFEN 400 MG: 200 TABLET, FILM COATED ORAL at 17:55

## 2019-09-27 RX ADMIN — IBUPROFEN 400 MG: 200 TABLET, FILM COATED ORAL at 08:29

## 2019-09-27 RX ADMIN — VORTIOXETINE 20 MG: 10 TABLET, FILM COATED ORAL at 09:01

## 2019-09-27 RX ADMIN — INSULIN LISPRO 4 UNITS: 100 INJECTION, SOLUTION INTRAVENOUS; SUBCUTANEOUS at 09:10

## 2019-09-27 RX ADMIN — INFLUENZA A VIRUS A/BRISBANE/02/2018 IVR-190 (H1N1) ANTIGEN (PROPIOLACTONE INACTIVATED), INFLUENZA A VIRUS A/KANSAS/14/2017 X-327 (H3N2) ANTIGEN (PROPIOLACTONE INACTIVATED), INFLUENZA B VIRUS B/MARYLAND/15/2016 ANTIGEN (PROPIOLACTONE INACTIVATED), INFLUENZA B VIRUS B/PHUKET/3073/2013 BVR-1B ANTIGEN (PROPIOLACTONE INACTIVATED) 0.5 ML: 15; 15; 15; 15 INJECTION, SUSPENSION INTRAMUSCULAR at 09:05

## 2019-09-27 RX ADMIN — ATORVASTATIN CALCIUM 40 MG: 40 TABLET, FILM COATED ORAL at 08:29

## 2019-09-27 RX ADMIN — SENNOSIDES 8.6 MG: 8.6 TABLET, FILM COATED ORAL at 20:37

## 2019-09-27 RX ADMIN — BUPRENORPHINE AND NALOXONE 3 TABLET: 2; .5 TABLET SUBLINGUAL at 10:54

## 2019-09-27 RX ADMIN — BENZOCAINE AND MENTHOL 1 LOZENGE: 15; 3.6 LOZENGE ORAL at 08:30

## 2019-09-27 RX ADMIN — INSULIN LISPRO 30 UNITS: 100 INJECTION, SOLUTION INTRAVENOUS; SUBCUTANEOUS at 09:10

## 2019-09-27 RX ADMIN — PIPERACILLIN SODIUM,TAZOBACTAM SODIUM 3.38 G: 3; .375 INJECTION, POWDER, FOR SOLUTION INTRAVENOUS at 17:44

## 2019-09-27 RX ADMIN — MAGNESIUM CITRATE 296 ML: 1.75 LIQUID ORAL at 15:41

## 2019-09-27 ASSESSMENT — PAIN SCALES - GENERAL
PAINLEVEL_OUTOF10: 2

## 2019-09-27 ASSESSMENT — PAIN DESCRIPTION - DESCRIPTORS
DESCRIPTORS: CRAMPING
DESCRIPTORS: CRAMPING

## 2019-09-27 ASSESSMENT — PAIN DESCRIPTION - LOCATION
LOCATION: FOOT
LOCATION: FOOT

## 2019-09-27 ASSESSMENT — PAIN DESCRIPTION - ORIENTATION
ORIENTATION: RIGHT
ORIENTATION: RIGHT

## 2019-09-27 ASSESSMENT — PAIN DESCRIPTION - PAIN TYPE
TYPE: SURGICAL PAIN
TYPE: SURGICAL PAIN

## 2019-09-27 ASSESSMENT — PAIN - FUNCTIONAL ASSESSMENT
PAIN_FUNCTIONAL_ASSESSMENT: PREVENTS OR INTERFERES SOME ACTIVE ACTIVITIES AND ADLS
PAIN_FUNCTIONAL_ASSESSMENT: PREVENTS OR INTERFERES SOME ACTIVE ACTIVITIES AND ADLS

## 2019-09-27 ASSESSMENT — PAIN DESCRIPTION - PROGRESSION
CLINICAL_PROGRESSION: GRADUALLY WORSENING
CLINICAL_PROGRESSION: NOT CHANGED

## 2019-09-27 ASSESSMENT — PAIN DESCRIPTION - FREQUENCY
FREQUENCY: CONTINUOUS
FREQUENCY: CONTINUOUS

## 2019-09-27 ASSESSMENT — PAIN DESCRIPTION - ONSET
ONSET: ON-GOING
ONSET: GRADUAL

## 2019-09-27 NOTE — PROGRESS NOTES
DC      DISPO: s/p R hallux amp with primary closure. Patient OK for discharge from Podiatry standpoint. Pt will follow up with Dr. Ronnie Le in his private office upon DC    Discussed assessment and plan with Dr. Ronnie Le.     Jaquan Naik  Pager: [phone]  9/27/2019  10:11 AM

## 2019-09-27 NOTE — PROGRESS NOTES
VSS. Pt c/o minor pain in right foot. Administered tylenol with benefit. RLE elevated with pillow support. IVF infusing with intermittent antibiotics. Ace wrap remains CD&I this shift. Voiding without difficulty. All needs met at this time. Will continue to monitor.

## 2019-09-27 NOTE — ANESTHESIA POSTPROCEDURE EVALUATION
Department of Anesthesiology  Postprocedure Note    Patient: Butch Charlton  MRN: [id number]  YOB: 1980  Date of evaluation: 9/27/2019  Time:  12:25 AM     Procedure Summary     Date:  09/26/19 Room / Location:  [facility] OR 14 / [facility] OR    Anesthesia Start:  2086 Anesthesia Stop:  6193    Procedure:  RIGHT HALLUX AMPUTATION WITH POSSIBLE 1ST RAY AMPUTATION (Right Foot) Diagnosis:  (GANGRENE RIGHT HALLUX)    Surgeon:  Rishi Lyons DPM Responsible Provider:  Kelsey Mathias MD    Anesthesia Type:  general ASA Status:  3          Anesthesia Type: No value filed. Nasima Phase I: Nasima Score: 10    Nasima Phase II:      Last vitals: Reviewed and per EMR flowsheets.        Anesthesia Post Evaluation    Patient location during evaluation: PACU  Patient participation: complete - patient participated  Level of consciousness: awake and alert  Pain score: 0  Airway patency: patent  Nausea & Vomiting: no nausea and no vomiting  Complications: no  Cardiovascular status: hemodynamically stable  Respiratory status: acceptable  Hydration status: euvolemic

## 2019-09-28 VITALS
HEART RATE: 83 BPM | OXYGEN SATURATION: 96 % | RESPIRATION RATE: 18 BRPM | SYSTOLIC BLOOD PRESSURE: 135 MMHG | BODY MASS INDEX: 39.17 KG/M2 | TEMPERATURE: 98.3 F | DIASTOLIC BLOOD PRESSURE: 90 MMHG | HEIGHT: 75 IN | WEIGHT: 315 LBS

## 2019-09-28 LAB
ANION GAP SERPL CALCULATED.3IONS-SCNC: 14 MMOL/L (ref 3–16)
BUN BLDV-MCNC: 11 MG/DL (ref 7–20)
CALCIUM SERPL-MCNC: 8.6 MG/DL (ref 8.3–10.6)
CHLORIDE BLD-SCNC: 106 MMOL/L (ref 99–110)
CO2: 22 MMOL/L (ref 21–32)
CREAT SERPL-MCNC: 0.7 MG/DL (ref 0.9–1.3)
GFR AFRICAN AMERICAN: >60
GFR NON-AFRICAN AMERICAN: >60
GLUCOSE BLD-MCNC: 138 MG/DL (ref 70–99)
GLUCOSE BLD-MCNC: 164 MG/DL (ref 70–99)
GLUCOSE BLD-MCNC: 169 MG/DL (ref 70–99)
PERFORMED ON: ABNORMAL
PERFORMED ON: ABNORMAL
POTASSIUM REFLEX MAGNESIUM: 4 MMOL/L (ref 3.5–5.1)
SODIUM BLD-SCNC: 142 MMOL/L (ref 136–145)

## 2019-09-28 PROCEDURE — 80048 BASIC METABOLIC PNL TOTAL CA: CPT

## 2019-09-28 PROCEDURE — 6360000002 HC RX W HCPCS: Performed by: STUDENT IN AN ORGANIZED HEALTH CARE EDUCATION/TRAINING PROGRAM

## 2019-09-28 PROCEDURE — 2580000003 HC RX 258: Performed by: STUDENT IN AN ORGANIZED HEALTH CARE EDUCATION/TRAINING PROGRAM

## 2019-09-28 PROCEDURE — 36415 COLL VENOUS BLD VENIPUNCTURE: CPT

## 2019-09-28 PROCEDURE — 6370000000 HC RX 637 (ALT 250 FOR IP): Performed by: STUDENT IN AN ORGANIZED HEALTH CARE EDUCATION/TRAINING PROGRAM

## 2019-09-28 PROCEDURE — 6370000000 HC RX 637 (ALT 250 FOR IP): Performed by: INTERNAL MEDICINE

## 2019-09-28 RX ADMIN — PIPERACILLIN SODIUM,TAZOBACTAM SODIUM 3.38 G: 3; .375 INJECTION, POWDER, FOR SOLUTION INTRAVENOUS at 00:46

## 2019-09-28 RX ADMIN — Medication 10 ML: at 09:46

## 2019-09-28 RX ADMIN — BUPRENORPHINE AND NALOXONE 3 TABLET: 2; .5 TABLET SUBLINGUAL at 13:21

## 2019-09-28 RX ADMIN — INSULIN LISPRO 30 UNITS: 100 INJECTION, SOLUTION INTRAVENOUS; SUBCUTANEOUS at 12:16

## 2019-09-28 RX ADMIN — BUSPIRONE HYDROCHLORIDE 7.5 MG: 5 TABLET ORAL at 09:44

## 2019-09-28 RX ADMIN — INSULIN LISPRO 2 UNITS: 100 INJECTION, SOLUTION INTRAVENOUS; SUBCUTANEOUS at 09:49

## 2019-09-28 RX ADMIN — COLLAGENASE SANTYL: 250 OINTMENT TOPICAL at 09:45

## 2019-09-28 RX ADMIN — ENOXAPARIN SODIUM 40 MG: 40 INJECTION SUBCUTANEOUS at 09:45

## 2019-09-28 RX ADMIN — SENNOSIDES 8.6 MG: 8.6 TABLET, FILM COATED ORAL at 09:45

## 2019-09-28 RX ADMIN — VORTIOXETINE 20 MG: 10 TABLET, FILM COATED ORAL at 09:44

## 2019-09-28 RX ADMIN — PIPERACILLIN SODIUM,TAZOBACTAM SODIUM 3.38 G: 3; .375 INJECTION, POWDER, FOR SOLUTION INTRAVENOUS at 09:45

## 2019-09-28 RX ADMIN — VITAMIN D 2000 UNITS: 25 TAB ORAL at 09:44

## 2019-09-28 RX ADMIN — ATORVASTATIN CALCIUM 40 MG: 40 TABLET, FILM COATED ORAL at 09:45

## 2019-09-28 RX ADMIN — LISINOPRIL 40 MG: 40 TABLET ORAL at 09:45

## 2019-09-28 RX ADMIN — INSULIN LISPRO 30 UNITS: 100 INJECTION, SOLUTION INTRAVENOUS; SUBCUTANEOUS at 09:50

## 2019-09-28 ASSESSMENT — PAIN SCALES - GENERAL
PAINLEVEL_OUTOF10: 0
PAINLEVEL_OUTOF10: 0

## 2019-09-28 NOTE — DISCHARGE SUMMARY
Cranial nerves: II-XII intact, grossly non-focal.  Psychiatric:  Alert and oriented, thought content appropriate, normal insight  Capillary Refill: Brisk,< 3 seconds   Peripheral Pulses: +2 palpable, equal bilaterally     Labs: For convenience and continuity at follow-up the following most recent labs are provided:      CBC:    Lab Results   Component Value Date    WBC 7.8 09/27/2019    HGB 10.9 09/27/2019    HCT 33.3 09/27/2019     09/27/2019       Renal:    Lab Results   Component Value Date     09/28/2019    K 4.0 09/28/2019     09/28/2019    CO2 22 09/28/2019    BUN 11 09/28/2019    CREATININE 0.7 09/28/2019    CALCIUM 8.6 09/28/2019         Significant Diagnostic Studies    Radiology:   XR FOOT RIGHT (MIN 3 VIEWS)   Final Result      1. Status post amputation of the great toe. VL DUP LOWER EXTREMITY ARTERIES BILATERAL   Final Result      XR TOE RIGHT (MIN 2 VIEWS)   Final Result      Limited evaluation due to overlying artifact      No fracture      If concern for acute injury splinting with follow-up recommended      XR FOOT RIGHT (MIN 3 VIEWS)   Final Result      No acute bony pathology             Consults:     IP CONSULT TO PODIATRY  PHARMACY TO DOSE VANCOMYCIN  IP CONSULT TO HOSPITALIST  IP CONSULT TO INFECTIOUS DISEASES  IP CONSULT TO PHARMACY    Disposition:  Home     Condition at Discharge: Stable    Discharge Instructions/Follow-up:    Podiatric Post Operative Instructions     Fluids and Diet:  · Begin with clear liquids, broth, dry toast, and crackers. · If not nauseated then resume your regular pre-operative diet when you are ready     Medications:  · Take your prescriptions as directed  · You may resume your regularly scheduled medications (unless otherwise directed)  · If any side effects or adverse reactions occur, discontinue the medication and contact your doctor.   · Review the patient drug information that is provided before you take any medication     Ambulation and just in front of the toes to just above the Left ankle  -Next, apply 4 inch ace bandage from just in front of the toes to just above the Left ankle  -Heel weightbearing as tolerated in post-op shoe for transfer purposes only, Right foot  -Please wear post-op shoe at all times when up on your feet moving around and ambulating          Code Status:  Full Code     Activity: activity as tolerated    Diet: diabetic diet      Discharge Medications:     Current Discharge Medication List           Details   collagenase (SANTYL) 250 UNIT/GM ointment Apply topically daily. Qty: 1 Tube, Refills: 1      sulfamethoxazole-trimethoprim (BACTRIM DS) 800-160 MG per tablet Take 1 tablet by mouth 2 times daily for 14 days  Qty: 28 tablet, Refills: 0              Details   naproxen (NAPROSYN) 500 MG tablet Take 500 mg by mouth 2 times daily (with meals)      VORTIoxetine HBr (TRINTELLIX) 20 MG TABS tablet Take 20 mg by mouth daily      busPIRone (BUSPAR) 15 MG tablet Take 7.5 mg by mouth 2 times daily      hydrochlorothiazide (MICROZIDE) 12.5 MG capsule Take 12.5 mg by mouth daily      Ertugliflozin L-PyroglutamicAc (STEGLATRO) 5 MG TABS Take 5 mg by mouth daily      senna (SENOKOT) 8.6 MG tablet Take 2 tablets by mouth daily      Cholecalciferol (VITAMIN D3) 2000 units CAPS Take by mouth      insulin glargine (LANTUS) 100 UNIT/ML injection pen Inject 40 Units into the skin nightly  Qty: 5 pen, Refills: 3      atorvastatin (LIPITOR) 40 MG tablet Take 40 mg by mouth daily      buprenorphine-naloxone (SUBOXONE) 8-2 MG SUBL SL tablet Place 1 tablet under the tongue daily.        insulin lispro (HUMALOG) 100 UNIT/ML injection vial Inject 30 Units into the skin 3 times daily (before meals) May add insulin depending on sugar/      lisinopril (PRINIVIL;ZESTRIL) 40 MG tablet Take 40 mg by mouth daily             Time Spent on discharge is more than 30 minutes in the examination, evaluation, counseling and review of medications and discharge

## 2019-10-01 LAB
ANAEROBIC CULTURE: ABNORMAL
CULTURE SURGICAL: ABNORMAL
GRAM STAIN RESULT: ABNORMAL
ORGANISM: ABNORMAL

## 2021-11-08 ENCOUNTER — HOSPITAL ENCOUNTER (INPATIENT)
Age: 41
LOS: 5 days | Discharge: HOME HEALTH | DRG: 098 | End: 2021-11-13
Attending: EMERGENCY MEDICINE | Admitting: INTERNAL MEDICINE
Payer: COMMERCIAL

## 2021-11-08 ENCOUNTER — APPOINTMENT (OUTPATIENT)
Dept: CT IMAGING | Age: 41
DRG: 098 | End: 2021-11-08
Payer: COMMERCIAL

## 2021-11-08 ENCOUNTER — APPOINTMENT (OUTPATIENT)
Dept: GENERAL RADIOLOGY | Age: 41
DRG: 098 | End: 2021-11-08
Payer: COMMERCIAL

## 2021-11-08 DIAGNOSIS — E87.5 HYPERKALEMIA: ICD-10-CM

## 2021-11-08 DIAGNOSIS — L02.11 NECK ABSCESS: ICD-10-CM

## 2021-11-08 DIAGNOSIS — R73.9 HYPERGLYCEMIA WITHOUT KETOSIS: ICD-10-CM

## 2021-11-08 DIAGNOSIS — L02.01 FACIAL ABSCESS: Primary | ICD-10-CM

## 2021-11-08 LAB
A/G RATIO: 0.9 (ref 1.1–2.2)
ALBUMIN SERPL-MCNC: 3.8 G/DL (ref 3.4–5)
ALP BLD-CCNC: 96 U/L (ref 40–129)
ALT SERPL-CCNC: 10 U/L (ref 10–40)
ANION GAP SERPL CALCULATED.3IONS-SCNC: 12 MMOL/L (ref 3–16)
ANION GAP SERPL CALCULATED.3IONS-SCNC: 12 MMOL/L (ref 3–16)
AST SERPL-CCNC: 8 U/L (ref 15–37)
BASOPHILS ABSOLUTE: 0 K/UL (ref 0–0.2)
BASOPHILS RELATIVE PERCENT: 0.4 %
BILIRUB SERPL-MCNC: 0.3 MG/DL (ref 0–1)
BUN BLDV-MCNC: 36 MG/DL (ref 7–20)
BUN BLDV-MCNC: 37 MG/DL (ref 7–20)
CALCIUM SERPL-MCNC: 8.9 MG/DL (ref 8.3–10.6)
CALCIUM SERPL-MCNC: 9.7 MG/DL (ref 8.3–10.6)
CHLORIDE BLD-SCNC: 90 MMOL/L (ref 99–110)
CHLORIDE BLD-SCNC: 96 MMOL/L (ref 99–110)
CO2: 23 MMOL/L (ref 21–32)
CO2: 24 MMOL/L (ref 21–32)
CREAT SERPL-MCNC: 0.9 MG/DL (ref 0.9–1.3)
CREAT SERPL-MCNC: 1 MG/DL (ref 0.9–1.3)
EOSINOPHILS ABSOLUTE: 0.2 K/UL (ref 0–0.6)
EOSINOPHILS RELATIVE PERCENT: 1.8 %
GFR AFRICAN AMERICAN: >60
GFR AFRICAN AMERICAN: >60
GFR NON-AFRICAN AMERICAN: >60
GFR NON-AFRICAN AMERICAN: >60
GLUCOSE BLD-MCNC: 400 MG/DL (ref 70–99)
GLUCOSE BLD-MCNC: 494 MG/DL (ref 70–99)
GLUCOSE BLD-MCNC: 699 MG/DL (ref 70–99)
HCT VFR BLD CALC: 36 % (ref 40.5–52.5)
HEMOGLOBIN: 11.6 G/DL (ref 13.5–17.5)
LYMPHOCYTES ABSOLUTE: 1.3 K/UL (ref 1–5.1)
LYMPHOCYTES RELATIVE PERCENT: 11.4 %
MCH RBC QN AUTO: 26.4 PG (ref 26–34)
MCHC RBC AUTO-ENTMCNC: 32.3 G/DL (ref 31–36)
MCV RBC AUTO: 81.8 FL (ref 80–100)
MONOCYTES ABSOLUTE: 0.6 K/UL (ref 0–1.3)
MONOCYTES RELATIVE PERCENT: 5.4 %
NEUTROPHILS ABSOLUTE: 9.5 K/UL (ref 1.7–7.7)
NEUTROPHILS RELATIVE PERCENT: 81 %
PDW BLD-RTO: 14.5 % (ref 12.4–15.4)
PERFORMED ON: ABNORMAL
PLATELET # BLD: 471 K/UL (ref 135–450)
PMV BLD AUTO: 7.4 FL (ref 5–10.5)
POTASSIUM REFLEX MAGNESIUM: 4.6 MMOL/L (ref 3.5–5.1)
POTASSIUM REFLEX MAGNESIUM: 5.7 MMOL/L (ref 3.5–5.1)
RBC # BLD: 4.4 M/UL (ref 4.2–5.9)
SODIUM BLD-SCNC: 126 MMOL/L (ref 136–145)
SODIUM BLD-SCNC: 131 MMOL/L (ref 136–145)
TOTAL PROTEIN: 7.9 G/DL (ref 6.4–8.2)
WBC # BLD: 11.8 K/UL (ref 4–11)

## 2021-11-08 PROCEDURE — 36415 COLL VENOUS BLD VENIPUNCTURE: CPT

## 2021-11-08 PROCEDURE — 2580000003 HC RX 258: Performed by: FAMILY MEDICINE

## 2021-11-08 PROCEDURE — 87075 CULTR BACTERIA EXCEPT BLOOD: CPT

## 2021-11-08 PROCEDURE — 85025 COMPLETE CBC W/AUTO DIFF WBC: CPT

## 2021-11-08 PROCEDURE — 87205 SMEAR GRAM STAIN: CPT

## 2021-11-08 PROCEDURE — 70491 CT SOFT TISSUE NECK W/DYE: CPT

## 2021-11-08 PROCEDURE — 2060000000 HC ICU INTERMEDIATE R&B

## 2021-11-08 PROCEDURE — 87070 CULTURE OTHR SPECIMN AEROBIC: CPT

## 2021-11-08 PROCEDURE — 2580000003 HC RX 258: Performed by: EMERGENCY MEDICINE

## 2021-11-08 PROCEDURE — 80053 COMPREHEN METABOLIC PANEL: CPT

## 2021-11-08 PROCEDURE — 6360000002 HC RX W HCPCS: Performed by: EMERGENCY MEDICINE

## 2021-11-08 PROCEDURE — 6370000000 HC RX 637 (ALT 250 FOR IP): Performed by: EMERGENCY MEDICINE

## 2021-11-08 PROCEDURE — 96365 THER/PROPH/DIAG IV INF INIT: CPT

## 2021-11-08 PROCEDURE — 71046 X-RAY EXAM CHEST 2 VIEWS: CPT

## 2021-11-08 PROCEDURE — 96375 TX/PRO/DX INJ NEW DRUG ADDON: CPT

## 2021-11-08 PROCEDURE — 6360000004 HC RX CONTRAST MEDICATION: Performed by: EMERGENCY MEDICINE

## 2021-11-08 PROCEDURE — 99284 EMERGENCY DEPT VISIT MOD MDM: CPT

## 2021-11-08 RX ORDER — INSULIN LISPRO 100 [IU]/ML
0-9 INJECTION, SOLUTION INTRAVENOUS; SUBCUTANEOUS NIGHTLY
Status: DISCONTINUED | OUTPATIENT
Start: 2021-11-08 | End: 2021-11-09

## 2021-11-08 RX ORDER — DEXTROSE MONOHYDRATE 50 MG/ML
100 INJECTION, SOLUTION INTRAVENOUS PRN
Status: DISCONTINUED | OUTPATIENT
Start: 2021-11-08 | End: 2021-11-13 | Stop reason: HOSPADM

## 2021-11-08 RX ORDER — 0.9 % SODIUM CHLORIDE 0.9 %
2000 INTRAVENOUS SOLUTION INTRAVENOUS ONCE
Status: COMPLETED | OUTPATIENT
Start: 2021-11-08 | End: 2021-11-08

## 2021-11-08 RX ORDER — DEXTROSE MONOHYDRATE 25 G/50ML
12.5 INJECTION, SOLUTION INTRAVENOUS PRN
Status: DISCONTINUED | OUTPATIENT
Start: 2021-11-08 | End: 2021-11-13 | Stop reason: HOSPADM

## 2021-11-08 RX ORDER — ATORVASTATIN CALCIUM 40 MG/1
40 TABLET, FILM COATED ORAL DAILY
Status: DISCONTINUED | OUTPATIENT
Start: 2021-11-09 | End: 2021-11-13 | Stop reason: HOSPADM

## 2021-11-08 RX ORDER — 0.9 % SODIUM CHLORIDE 0.9 %
1000 INTRAVENOUS SOLUTION INTRAVENOUS ONCE
Status: DISCONTINUED | OUTPATIENT
Start: 2021-11-08 | End: 2021-11-13 | Stop reason: HOSPADM

## 2021-11-08 RX ORDER — ONDANSETRON 4 MG/1
4 TABLET, ORALLY DISINTEGRATING ORAL EVERY 8 HOURS PRN
Status: DISCONTINUED | OUTPATIENT
Start: 2021-11-08 | End: 2021-11-13 | Stop reason: HOSPADM

## 2021-11-08 RX ORDER — ERGOCALCIFEROL 1.25 MG/1
CAPSULE ORAL
COMMUNITY
Start: 2021-10-19

## 2021-11-08 RX ORDER — ACETAMINOPHEN 650 MG/1
650 SUPPOSITORY RECTAL EVERY 6 HOURS PRN
Status: DISCONTINUED | OUTPATIENT
Start: 2021-11-08 | End: 2021-11-13 | Stop reason: HOSPADM

## 2021-11-08 RX ORDER — GABAPENTIN 600 MG/1
1200 TABLET ORAL 3 TIMES DAILY
COMMUNITY
Start: 2021-10-13

## 2021-11-08 RX ORDER — ONDANSETRON 2 MG/ML
4 INJECTION INTRAMUSCULAR; INTRAVENOUS EVERY 6 HOURS PRN
Status: DISCONTINUED | OUTPATIENT
Start: 2021-11-08 | End: 2021-11-13 | Stop reason: HOSPADM

## 2021-11-08 RX ORDER — INSULIN LISPRO 100 [IU]/ML
0-18 INJECTION, SOLUTION INTRAVENOUS; SUBCUTANEOUS
Status: DISCONTINUED | OUTPATIENT
Start: 2021-11-09 | End: 2021-11-09

## 2021-11-08 RX ORDER — SODIUM CHLORIDE 0.9 % (FLUSH) 0.9 %
5-40 SYRINGE (ML) INJECTION PRN
Status: DISCONTINUED | OUTPATIENT
Start: 2021-11-08 | End: 2021-11-13 | Stop reason: HOSPADM

## 2021-11-08 RX ORDER — BUPRENORPHINE HYDROCHLORIDE AND NALOXONE HYDROCHLORIDE DIHYDRATE 2; .5 MG/1; MG/1
4 TABLET SUBLINGUAL DAILY
Status: DISCONTINUED | OUTPATIENT
Start: 2021-11-09 | End: 2021-11-13 | Stop reason: HOSPADM

## 2021-11-08 RX ORDER — SODIUM CHLORIDE 9 MG/ML
25 INJECTION, SOLUTION INTRAVENOUS PRN
Status: DISCONTINUED | OUTPATIENT
Start: 2021-11-08 | End: 2021-11-13 | Stop reason: HOSPADM

## 2021-11-08 RX ORDER — CITALOPRAM 10 MG/1
10 TABLET ORAL DAILY
Status: DISCONTINUED | OUTPATIENT
Start: 2021-11-09 | End: 2021-11-13 | Stop reason: HOSPADM

## 2021-11-08 RX ORDER — INSULIN LISPRO 100 [IU]/ML
30 INJECTION, SOLUTION INTRAVENOUS; SUBCUTANEOUS
Status: DISCONTINUED | OUTPATIENT
Start: 2021-11-09 | End: 2021-11-09

## 2021-11-08 RX ORDER — LISINOPRIL 40 MG/1
40 TABLET ORAL DAILY
Status: DISCONTINUED | OUTPATIENT
Start: 2021-11-09 | End: 2021-11-13 | Stop reason: HOSPADM

## 2021-11-08 RX ORDER — POLYETHYLENE GLYCOL 3350 17 G/17G
17 POWDER, FOR SOLUTION ORAL DAILY PRN
Status: DISCONTINUED | OUTPATIENT
Start: 2021-11-08 | End: 2021-11-13 | Stop reason: HOSPADM

## 2021-11-08 RX ORDER — SODIUM CHLORIDE 0.9 % (FLUSH) 0.9 %
5-40 SYRINGE (ML) INJECTION EVERY 12 HOURS SCHEDULED
Status: DISCONTINUED | OUTPATIENT
Start: 2021-11-08 | End: 2021-11-13 | Stop reason: HOSPADM

## 2021-11-08 RX ORDER — INSULIN LISPRO 100 [IU]/ML
10 INJECTION, SOLUTION INTRAVENOUS; SUBCUTANEOUS ONCE
Status: COMPLETED | OUTPATIENT
Start: 2021-11-09 | End: 2021-11-09

## 2021-11-08 RX ORDER — ACETAMINOPHEN 325 MG/1
650 TABLET ORAL EVERY 6 HOURS PRN
Status: DISCONTINUED | OUTPATIENT
Start: 2021-11-08 | End: 2021-11-13 | Stop reason: HOSPADM

## 2021-11-08 RX ORDER — NICOTINE POLACRILEX 4 MG
15 LOZENGE BUCCAL PRN
Status: DISCONTINUED | OUTPATIENT
Start: 2021-11-08 | End: 2021-11-13 | Stop reason: HOSPADM

## 2021-11-08 RX ORDER — CITALOPRAM 10 MG/1
TABLET ORAL
COMMUNITY
Start: 2021-10-26 | End: 2022-02-14 | Stop reason: ALTCHOICE

## 2021-11-08 RX ORDER — SENNA PLUS 8.6 MG/1
2 TABLET ORAL DAILY
Status: DISCONTINUED | OUTPATIENT
Start: 2021-11-09 | End: 2021-11-13 | Stop reason: HOSPADM

## 2021-11-08 RX ORDER — GABAPENTIN 600 MG/1
600 TABLET ORAL 3 TIMES DAILY
Status: DISCONTINUED | OUTPATIENT
Start: 2021-11-08 | End: 2021-11-13 | Stop reason: HOSPADM

## 2021-11-08 RX ADMIN — DEXTROSE MONOHYDRATE 2000 MG: 50 INJECTION, SOLUTION INTRAVENOUS at 20:14

## 2021-11-08 RX ADMIN — SODIUM CHLORIDE 2000 ML: 9 INJECTION, SOLUTION INTRAVENOUS at 18:39

## 2021-11-08 RX ADMIN — IOPAMIDOL 80 ML: 755 INJECTION, SOLUTION INTRAVENOUS at 18:53

## 2021-11-08 RX ADMIN — INSULIN HUMAN 10 UNITS: 100 INJECTION, SOLUTION PARENTERAL at 19:18

## 2021-11-08 RX ADMIN — PIPERACILLIN SODIUM AND TAZOBACTAM SODIUM 4500 MG: 4; .5 INJECTION, POWDER, LYOPHILIZED, FOR SOLUTION INTRAVENOUS at 18:52

## 2021-11-08 ASSESSMENT — PAIN SCALES - GENERAL: PAINLEVEL_OUTOF10: 0

## 2021-11-08 NOTE — ED NOTES
Patient to ed with complaints of a very large left facial abscess which started several weeks ago from a dental problem, patient reports he had his tooth pulled however the swelling on the outside of his jaw and neck is now draining copious amounts of cream/brown colored drainage.      Johana Etienne RN  11/08/21 [id number]

## 2021-11-08 NOTE — ED PROVIDER NOTES
CRITICAL CARE NOTE  There was a high probability of clinical significant / life threatening deterioration in the patient's condition which required my urgent intervention. Total critical care time was at least 32  minutes excluding any separately reported procedures. I have personally performed a face to face diagnostic evaluation on this patient. I have fully participated in the care of this patient. I have reviewed and agree with all pertinent clinical information including history, physical exam, diagnostic tests, and the plan. This patient was seen by resident Dr. Enoch Gutierrez    History and Physical as follows:  78-year-old male with history of diabetes on lispro 10 units 3 times daily, Lantus 20 units twice daily,  Jardiance and Actos, history of chronic poorly controlled diabetes, status post transmetatarsal amputation of the right foot and amputation of 2 toes of the left foot presents complaining of left facial swelling. Toward the end of October the patient had some swelling and redness of his left face. He was seen by his dentist initially on October 27 who felt that a previously decayed left lower second molar was the source of the swelling. On November 4 his dentist removed the tooth and the patient had immediate purulent drainage into the oral cavity from the extraction site. Over the last few days he has had increased swelling of the left side of his face with purulent drainage from multiple open areas along the left mandible in the area of his beard. The patient finished a course of Augmentin on November 4. He states he is not having much pain in the area but is concerned about the swelling. Denies fever or chills. The patient has tolerated Zosyn and vancomycin in the past.  He did not take any of his lispro or Lantus insulin today because he has not eaten much. He did take his Jardiance and Actos today. Afebrile with heart rate 103, room air sat 100% and blood pressure 150/96.   No respiratory distress. Lungs are clear bilaterally. Heart is regular rate and rhythm with heart rate 96. Abdomen is nontender. Extremity exam shows previous right foot transmetatarsal amputation. The patient has a large fluctuant mass measuring 13 x 8 cm on the right side of his face the anterior portion being over the mandible in the posterior portion being inferior to the angle of the mandible. Multiple small areas anterior are draining purulent material which was sent for culture. The patient has a beard and there is some erythema in the hairbearing area on the left with probable folliculitis. There is some induration anteriorly with more fluctuance posteriorly. He has some minimal trismus. Oropharynx shows next dental extraction site on the left lower ridge second molar but there is no purulent drainage or evidence of foreign body. WBC was 11.8 with hemoglobin 11.6. CMP shows sodium 126, potassium 5.7, chloride 90 with bicarb 24, anion gap 12 and glucose 699. BUN is 37 and creatinine 1.0. Liver function tests were normal.  Chest x-ray was read as a negative study by the radiologist on duty. CT scan of the soft soft tissue neck with contrast read by the radiologist shows a large ill-defined fluid collection centered along the inferior aspect of the left mandible measuring 8.9 x 3.9 x 4.3 cm. No evidence of airway compromise. Dental caries noted but there is no mention of dental root or incompletely removed tooth. IV Zosyn and IV vancomycin were ordered. Saline was ordered. The patient was given 10 units of regular insulin IV. Repeat BMP was ordered to be done at 2000. At 1850, I discussed the case with the ENT on-call Dr. Braxton Moreau who stated he would see the patient in consultation if there was no remnant of tooth in the extraction site status post dental extraction. I feel the patient needs more definitive incision and drainage of the large abscess then can be performed in the ED.   A call was placed to the hospitalist for admission. At 1940 I discussed the case with the hospitalist on duty, Dr. Teresita Vizcaino, who accepted the patient for admission at [facility]. Patient will need medical clearance with hydration and control of the hyperglycemia. RADIOLOGY  CT SOFT TISSUE NECK W CONTRAST   Final Result      Large ill-defined fluid collection centered along the inferior aspect of the left mandible measuring approximately 8.9 x 3.9 x 4.3 cm. No evidence of airway compromise. XR CHEST (2 VW)   Final Result      No evidence of acute cardiopulmonary disease. Lab  Labs Reviewed   CBC WITH AUTO DIFFERENTIAL - Abnormal; Notable for the following components:       Result Value    WBC 11.8 (*)     Hemoglobin 11.6 (*)     Hematocrit 36.0 (*)     Platelets 012 (*)     Neutrophils Absolute 9.5 (*)     All other components within normal limits    Narrative:     Performed at:  [facility]  [facility]   Phone [phone]   COMPREHENSIVE METABOLIC PANEL W/ REFLEX TO MG FOR LOW K - Abnormal; Notable for the following components:    Sodium 126 (*)     Potassium reflex Magnesium 5.7 (*)     Chloride 90 (*)     Glucose 699 (*)     BUN 37 (*)     Albumin/Globulin Ratio 0.9 (*)     AST 8 (*)     All other components within normal limits    Narrative:     Odilia Kaur tel. [id number],  Chemistry results called to and read back by Dr. Bonnie Mancia , 11/08/2021 18:27, by  [facility]  Performed at:  [facility]  [facility]   Phone [phone]   CULTURE, ANAEROBIC AND AEROBIC         DX: 1) facial abscess         2) hyperglycemia without ketosis         3) hyperkalemia          Maria Eugenia Singh MD  11/08/21 2012

## 2021-11-09 ENCOUNTER — ANESTHESIA EVENT (OUTPATIENT)
Dept: OPERATING ROOM | Age: 41
DRG: 098 | End: 2021-11-09
Payer: COMMERCIAL

## 2021-11-09 ENCOUNTER — ANESTHESIA (OUTPATIENT)
Dept: OPERATING ROOM | Age: 41
DRG: 098 | End: 2021-11-09
Payer: COMMERCIAL

## 2021-11-09 VITALS — OXYGEN SATURATION: 94 % | DIASTOLIC BLOOD PRESSURE: 54 MMHG | SYSTOLIC BLOOD PRESSURE: 91 MMHG

## 2021-11-09 LAB
ABO/RH: NORMAL
ANION GAP SERPL CALCULATED.3IONS-SCNC: 13 MMOL/L (ref 3–16)
ANTIBODY SCREEN: NORMAL
BASOPHILS ABSOLUTE: 0.1 K/UL (ref 0–0.2)
BASOPHILS RELATIVE PERCENT: 0.5 %
BUN BLDV-MCNC: 26 MG/DL (ref 7–20)
CALCIUM SERPL-MCNC: 8.7 MG/DL (ref 8.3–10.6)
CHLORIDE BLD-SCNC: 99 MMOL/L (ref 99–110)
CO2: 21 MMOL/L (ref 21–32)
CREAT SERPL-MCNC: 0.7 MG/DL (ref 0.9–1.3)
EKG ATRIAL RATE: 85 BPM
EKG DIAGNOSIS: NORMAL
EKG P AXIS: 50 DEGREES
EKG P-R INTERVAL: 202 MS
EKG Q-T INTERVAL: 386 MS
EKG QRS DURATION: 100 MS
EKG QTC CALCULATION (BAZETT): 459 MS
EKG R AXIS: 18 DEGREES
EKG T AXIS: 49 DEGREES
EKG VENTRICULAR RATE: 85 BPM
EOSINOPHILS ABSOLUTE: 0.3 K/UL (ref 0–0.6)
EOSINOPHILS RELATIVE PERCENT: 2.6 %
ESTIMATED AVERAGE GLUCOSE: 286.2 MG/DL
GFR AFRICAN AMERICAN: >60
GFR NON-AFRICAN AMERICAN: >60
GLUCOSE BLD-MCNC: 175 MG/DL (ref 70–99)
GLUCOSE BLD-MCNC: 191 MG/DL (ref 70–99)
GLUCOSE BLD-MCNC: 220 MG/DL (ref 70–99)
GLUCOSE BLD-MCNC: 261 MG/DL (ref 70–99)
GLUCOSE BLD-MCNC: 296 MG/DL (ref 70–99)
GLUCOSE BLD-MCNC: 299 MG/DL (ref 70–99)
GLUCOSE BLD-MCNC: 306 MG/DL (ref 70–99)
GLUCOSE BLD-MCNC: 312 MG/DL (ref 70–99)
GLUCOSE BLD-MCNC: 314 MG/DL (ref 70–99)
HBA1C MFR BLD: 11.6 %
HCT VFR BLD CALC: 32.4 % (ref 40.5–52.5)
HEMOGLOBIN: 10.6 G/DL (ref 13.5–17.5)
INR BLD: 1.09 (ref 0.88–1.12)
LYMPHOCYTES ABSOLUTE: 2.3 K/UL (ref 1–5.1)
LYMPHOCYTES RELATIVE PERCENT: 23 %
MCH RBC QN AUTO: 26.3 PG (ref 26–34)
MCHC RBC AUTO-ENTMCNC: 32.6 G/DL (ref 31–36)
MCV RBC AUTO: 80.5 FL (ref 80–100)
MONOCYTES ABSOLUTE: 0.7 K/UL (ref 0–1.3)
MONOCYTES RELATIVE PERCENT: 7.1 %
NEUTROPHILS ABSOLUTE: 6.7 K/UL (ref 1.7–7.7)
NEUTROPHILS RELATIVE PERCENT: 66.8 %
OSMOLALITY: 304 MOSM/KG (ref 275–295)
PDW BLD-RTO: 14.3 % (ref 12.4–15.4)
PERFORMED ON: ABNORMAL
PLATELET # BLD: 407 K/UL (ref 135–450)
PMV BLD AUTO: 7.2 FL (ref 5–10.5)
POTASSIUM REFLEX MAGNESIUM: 4.1 MMOL/L (ref 3.5–5.1)
PROTHROMBIN TIME: 12.4 SEC (ref 9.9–12.7)
RBC # BLD: 4.02 M/UL (ref 4.2–5.9)
SODIUM BLD-SCNC: 133 MMOL/L (ref 136–145)
VANCOMYCIN RANDOM: 11.9 UG/ML
WBC # BLD: 10.1 K/UL (ref 4–11)

## 2021-11-09 PROCEDURE — 87116 MYCOBACTERIA CULTURE: CPT

## 2021-11-09 PROCEDURE — 87070 CULTURE OTHR SPECIMN AEROBIC: CPT

## 2021-11-09 PROCEDURE — 21501 I&D DP ABSC/HMTMA SFT TS NCK: CPT | Performed by: OTOLARYNGOLOGY

## 2021-11-09 PROCEDURE — 0J950ZZ DRAINAGE OF LEFT NECK SUBCUTANEOUS TISSUE AND FASCIA, OPEN APPROACH: ICD-10-PCS | Performed by: STUDENT IN AN ORGANIZED HEALTH CARE EDUCATION/TRAINING PROGRAM

## 2021-11-09 PROCEDURE — 93010 ELECTROCARDIOGRAM REPORT: CPT | Performed by: INTERNAL MEDICINE

## 2021-11-09 PROCEDURE — 7100000001 HC PACU RECOVERY - ADDTL 15 MIN: Performed by: OTOLARYNGOLOGY

## 2021-11-09 PROCEDURE — 2060000000 HC ICU INTERMEDIATE R&B

## 2021-11-09 PROCEDURE — 6370000000 HC RX 637 (ALT 250 FOR IP): Performed by: STUDENT IN AN ORGANIZED HEALTH CARE EDUCATION/TRAINING PROGRAM

## 2021-11-09 PROCEDURE — 93005 ELECTROCARDIOGRAM TRACING: CPT | Performed by: STUDENT IN AN ORGANIZED HEALTH CARE EDUCATION/TRAINING PROGRAM

## 2021-11-09 PROCEDURE — 2500000003 HC RX 250 WO HCPCS: Performed by: NURSE ANESTHETIST, CERTIFIED REGISTERED

## 2021-11-09 PROCEDURE — 87077 CULTURE AEROBIC IDENTIFY: CPT

## 2021-11-09 PROCEDURE — 85025 COMPLETE CBC W/AUTO DIFF WBC: CPT

## 2021-11-09 PROCEDURE — 6360000002 HC RX W HCPCS: Performed by: STUDENT IN AN ORGANIZED HEALTH CARE EDUCATION/TRAINING PROGRAM

## 2021-11-09 PROCEDURE — 2580000003 HC RX 258: Performed by: STUDENT IN AN ORGANIZED HEALTH CARE EDUCATION/TRAINING PROGRAM

## 2021-11-09 PROCEDURE — 87015 SPECIMEN INFECT AGNT CONCNTJ: CPT

## 2021-11-09 PROCEDURE — 3700000000 HC ANESTHESIA ATTENDED CARE: Performed by: OTOLARYNGOLOGY

## 2021-11-09 PROCEDURE — 87205 SMEAR GRAM STAIN: CPT

## 2021-11-09 PROCEDURE — 80202 ASSAY OF VANCOMYCIN: CPT

## 2021-11-09 PROCEDURE — 87186 SC STD MICRODIL/AGAR DIL: CPT

## 2021-11-09 PROCEDURE — 2500000003 HC RX 250 WO HCPCS: Performed by: STUDENT IN AN ORGANIZED HEALTH CARE EDUCATION/TRAINING PROGRAM

## 2021-11-09 PROCEDURE — 6360000002 HC RX W HCPCS: Performed by: NURSE ANESTHETIST, CERTIFIED REGISTERED

## 2021-11-09 PROCEDURE — 2709999900 HC NON-CHARGEABLE SUPPLY: Performed by: OTOLARYNGOLOGY

## 2021-11-09 PROCEDURE — 83036 HEMOGLOBIN GLYCOSYLATED A1C: CPT

## 2021-11-09 PROCEDURE — 3600000012 HC SURGERY LEVEL 2 ADDTL 15MIN: Performed by: OTOLARYNGOLOGY

## 2021-11-09 PROCEDURE — 83930 ASSAY OF BLOOD OSMOLALITY: CPT

## 2021-11-09 PROCEDURE — 87206 SMEAR FLUORESCENT/ACID STAI: CPT

## 2021-11-09 PROCEDURE — 99221 1ST HOSP IP/OBS SF/LOW 40: CPT | Performed by: OTOLARYNGOLOGY

## 2021-11-09 PROCEDURE — 86901 BLOOD TYPING SEROLOGIC RH(D): CPT

## 2021-11-09 PROCEDURE — 36415 COLL VENOUS BLD VENIPUNCTURE: CPT

## 2021-11-09 PROCEDURE — 87102 FUNGUS ISOLATION CULTURE: CPT

## 2021-11-09 PROCEDURE — 80048 BASIC METABOLIC PNL TOTAL CA: CPT

## 2021-11-09 PROCEDURE — 3700000001 HC ADD 15 MINUTES (ANESTHESIA): Performed by: OTOLARYNGOLOGY

## 2021-11-09 PROCEDURE — 2580000003 HC RX 258: Performed by: NURSE ANESTHETIST, CERTIFIED REGISTERED

## 2021-11-09 PROCEDURE — 87075 CULTR BACTERIA EXCEPT BLOOD: CPT

## 2021-11-09 PROCEDURE — 86850 RBC ANTIBODY SCREEN: CPT

## 2021-11-09 PROCEDURE — 7100000000 HC PACU RECOVERY - FIRST 15 MIN: Performed by: OTOLARYNGOLOGY

## 2021-11-09 PROCEDURE — 86900 BLOOD TYPING SEROLOGIC ABO: CPT

## 2021-11-09 PROCEDURE — 3600000002 HC SURGERY LEVEL 2 BASE: Performed by: OTOLARYNGOLOGY

## 2021-11-09 PROCEDURE — 87106 FUNGI IDENTIFICATION YEAST: CPT

## 2021-11-09 PROCEDURE — 85610 PROTHROMBIN TIME: CPT

## 2021-11-09 RX ORDER — PROMETHAZINE HYDROCHLORIDE 25 MG/ML
6.25 INJECTION, SOLUTION INTRAMUSCULAR; INTRAVENOUS
Status: ACTIVE | OUTPATIENT
Start: 2021-11-09 | End: 2021-11-09

## 2021-11-09 RX ORDER — METOCLOPRAMIDE HYDROCHLORIDE 5 MG/ML
INJECTION INTRAMUSCULAR; INTRAVENOUS PRN
Status: DISCONTINUED | OUTPATIENT
Start: 2021-11-09 | End: 2021-11-09 | Stop reason: SDUPTHER

## 2021-11-09 RX ORDER — EPHEDRINE SULFATE 50 MG/ML
INJECTION INTRAVENOUS PRN
Status: DISCONTINUED | OUTPATIENT
Start: 2021-11-09 | End: 2021-11-09 | Stop reason: SDUPTHER

## 2021-11-09 RX ORDER — DIPHENHYDRAMINE HYDROCHLORIDE 50 MG/ML
25 INJECTION INTRAMUSCULAR; INTRAVENOUS EVERY 6 HOURS PRN
Status: DISCONTINUED | OUTPATIENT
Start: 2021-11-09 | End: 2021-11-13 | Stop reason: HOSPADM

## 2021-11-09 RX ORDER — SODIUM CHLORIDE 9 MG/ML
INJECTION, SOLUTION INTRAVENOUS CONTINUOUS PRN
Status: DISCONTINUED | OUTPATIENT
Start: 2021-11-09 | End: 2021-11-09 | Stop reason: SDUPTHER

## 2021-11-09 RX ORDER — LEVOFLOXACIN 5 MG/ML
500 INJECTION, SOLUTION INTRAVENOUS EVERY 24 HOURS
Status: DISCONTINUED | OUTPATIENT
Start: 2021-11-09 | End: 2021-11-11

## 2021-11-09 RX ORDER — SUCCINYLCHOLINE CHLORIDE 20 MG/ML
INJECTION INTRAMUSCULAR; INTRAVENOUS PRN
Status: DISCONTINUED | OUTPATIENT
Start: 2021-11-09 | End: 2021-11-09 | Stop reason: SDUPTHER

## 2021-11-09 RX ORDER — HYDRALAZINE HYDROCHLORIDE 20 MG/ML
5 INJECTION INTRAMUSCULAR; INTRAVENOUS EVERY 10 MIN PRN
Status: DISCONTINUED | OUTPATIENT
Start: 2021-11-09 | End: 2021-11-09 | Stop reason: HOSPADM

## 2021-11-09 RX ORDER — MEPERIDINE HYDROCHLORIDE 25 MG/ML
12.5 INJECTION INTRAMUSCULAR; INTRAVENOUS; SUBCUTANEOUS EVERY 5 MIN PRN
Status: DISCONTINUED | OUTPATIENT
Start: 2021-11-09 | End: 2021-11-09 | Stop reason: HOSPADM

## 2021-11-09 RX ORDER — PROPOFOL 10 MG/ML
INJECTION, EMULSION INTRAVENOUS PRN
Status: DISCONTINUED | OUTPATIENT
Start: 2021-11-09 | End: 2021-11-09 | Stop reason: SDUPTHER

## 2021-11-09 RX ORDER — INSULIN LISPRO 100 [IU]/ML
20 INJECTION, SOLUTION INTRAVENOUS; SUBCUTANEOUS
Status: DISCONTINUED | OUTPATIENT
Start: 2021-11-10 | End: 2021-11-09

## 2021-11-09 RX ORDER — INSULIN LISPRO 100 [IU]/ML
0-12 INJECTION, SOLUTION INTRAVENOUS; SUBCUTANEOUS EVERY 4 HOURS
Status: DISCONTINUED | OUTPATIENT
Start: 2021-11-09 | End: 2021-11-09

## 2021-11-09 RX ORDER — FENTANYL CITRATE 50 UG/ML
50 INJECTION, SOLUTION INTRAMUSCULAR; INTRAVENOUS EVERY 5 MIN PRN
Status: DISCONTINUED | OUTPATIENT
Start: 2021-11-09 | End: 2021-11-09 | Stop reason: HOSPADM

## 2021-11-09 RX ORDER — INSULIN LISPRO 100 [IU]/ML
20 INJECTION, SOLUTION INTRAVENOUS; SUBCUTANEOUS
Status: DISCONTINUED | OUTPATIENT
Start: 2021-11-09 | End: 2021-11-10

## 2021-11-09 RX ORDER — LABETALOL HYDROCHLORIDE 5 MG/ML
5 INJECTION, SOLUTION INTRAVENOUS EVERY 10 MIN PRN
Status: DISCONTINUED | OUTPATIENT
Start: 2021-11-09 | End: 2021-11-09 | Stop reason: HOSPADM

## 2021-11-09 RX ORDER — ONDANSETRON 2 MG/ML
INJECTION INTRAMUSCULAR; INTRAVENOUS PRN
Status: DISCONTINUED | OUTPATIENT
Start: 2021-11-09 | End: 2021-11-09 | Stop reason: SDUPTHER

## 2021-11-09 RX ORDER — INSULIN LISPRO 100 [IU]/ML
10 INJECTION, SOLUTION INTRAVENOUS; SUBCUTANEOUS ONCE
Status: COMPLETED | OUTPATIENT
Start: 2021-11-09 | End: 2021-11-09

## 2021-11-09 RX ORDER — CIPROFLOXACIN 500 MG/1
500 TABLET, FILM COATED ORAL EVERY 12 HOURS SCHEDULED
Status: DISCONTINUED | OUTPATIENT
Start: 2021-11-09 | End: 2021-11-09

## 2021-11-09 RX ORDER — INSULIN LISPRO 100 [IU]/ML
0-3 INJECTION, SOLUTION INTRAVENOUS; SUBCUTANEOUS NIGHTLY
Status: DISCONTINUED | OUTPATIENT
Start: 2021-11-09 | End: 2021-11-09

## 2021-11-09 RX ORDER — FENTANYL CITRATE 50 UG/ML
INJECTION, SOLUTION INTRAMUSCULAR; INTRAVENOUS PRN
Status: DISCONTINUED | OUTPATIENT
Start: 2021-11-09 | End: 2021-11-09 | Stop reason: SDUPTHER

## 2021-11-09 RX ORDER — FENTANYL CITRATE 50 UG/ML
25 INJECTION, SOLUTION INTRAMUSCULAR; INTRAVENOUS EVERY 5 MIN PRN
Status: DISCONTINUED | OUTPATIENT
Start: 2021-11-09 | End: 2021-11-09 | Stop reason: HOSPADM

## 2021-11-09 RX ORDER — INSULIN LISPRO 100 [IU]/ML
0-6 INJECTION, SOLUTION INTRAVENOUS; SUBCUTANEOUS NIGHTLY
Status: DISCONTINUED | OUTPATIENT
Start: 2021-11-09 | End: 2021-11-10

## 2021-11-09 RX ORDER — ONDANSETRON 2 MG/ML
4 INJECTION INTRAMUSCULAR; INTRAVENOUS
Status: DISCONTINUED | OUTPATIENT
Start: 2021-11-09 | End: 2021-11-09 | Stop reason: HOSPADM

## 2021-11-09 RX ORDER — INSULIN LISPRO 100 [IU]/ML
0-12 INJECTION, SOLUTION INTRAVENOUS; SUBCUTANEOUS
Status: DISCONTINUED | OUTPATIENT
Start: 2021-11-10 | End: 2021-11-10

## 2021-11-09 RX ORDER — INSULIN LISPRO 100 [IU]/ML
0-6 INJECTION, SOLUTION INTRAVENOUS; SUBCUTANEOUS
Status: DISCONTINUED | OUTPATIENT
Start: 2021-11-09 | End: 2021-11-09

## 2021-11-09 RX ORDER — INSULIN LISPRO 100 [IU]/ML
0-6 INJECTION, SOLUTION INTRAVENOUS; SUBCUTANEOUS EVERY 4 HOURS
Status: DISCONTINUED | OUTPATIENT
Start: 2021-11-09 | End: 2021-11-09

## 2021-11-09 RX ORDER — PHENYLEPHRINE HYDROCHLORIDE 10 MG/ML
INJECTION INTRAVENOUS PRN
Status: DISCONTINUED | OUTPATIENT
Start: 2021-11-09 | End: 2021-11-09 | Stop reason: SDUPTHER

## 2021-11-09 RX ORDER — ROCURONIUM BROMIDE 10 MG/ML
INJECTION, SOLUTION INTRAVENOUS PRN
Status: DISCONTINUED | OUTPATIENT
Start: 2021-11-09 | End: 2021-11-09 | Stop reason: SDUPTHER

## 2021-11-09 RX ORDER — OXYCODONE HYDROCHLORIDE AND ACETAMINOPHEN 5; 325 MG/1; MG/1
1 TABLET ORAL
Status: DISCONTINUED | OUTPATIENT
Start: 2021-11-09 | End: 2021-11-09 | Stop reason: HOSPADM

## 2021-11-09 RX ADMIN — VANCOMYCIN HYDROCHLORIDE 1500 MG: 10 INJECTION, POWDER, LYOPHILIZED, FOR SOLUTION INTRAVENOUS at 09:17

## 2021-11-09 RX ADMIN — GABAPENTIN 600 MG: 600 TABLET, FILM COATED ORAL at 09:30

## 2021-11-09 RX ADMIN — SODIUM CHLORIDE, PRESERVATIVE FREE 10 ML: 5 INJECTION INTRAVENOUS at 21:02

## 2021-11-09 RX ADMIN — PHENYLEPHRINE HYDROCHLORIDE 200 MCG: 10 INJECTION INTRAVENOUS at 15:49

## 2021-11-09 RX ADMIN — FAMOTIDINE 20 MG: 10 INJECTION, SOLUTION INTRAVENOUS at 15:36

## 2021-11-09 RX ADMIN — INSULIN GLARGINE 20 UNITS: 100 INJECTION, SOLUTION SUBCUTANEOUS at 09:22

## 2021-11-09 RX ADMIN — INSULIN LISPRO 4 UNITS: 100 INJECTION, SOLUTION INTRAVENOUS; SUBCUTANEOUS at 20:56

## 2021-11-09 RX ADMIN — PHENYLEPHRINE HYDROCHLORIDE 100 MCG: 10 INJECTION INTRAVENOUS at 15:42

## 2021-11-09 RX ADMIN — ACETAMINOPHEN 650 MG: 325 TABLET ORAL at 05:49

## 2021-11-09 RX ADMIN — SODIUM CHLORIDE 25 ML: 9 INJECTION, SOLUTION INTRAVENOUS at 09:15

## 2021-11-09 RX ADMIN — LEVOFLOXACIN 500 MG: 5 INJECTION, SOLUTION INTRAVENOUS at 07:01

## 2021-11-09 RX ADMIN — SODIUM CHLORIDE: 9 INJECTION, SOLUTION INTRAVENOUS at 15:21

## 2021-11-09 RX ADMIN — SUCCINYLCHOLINE CHLORIDE 200 MG: 20 INJECTION, SOLUTION INTRAMUSCULAR; INTRAVENOUS; PARENTERAL at 15:28

## 2021-11-09 RX ADMIN — CITALOPRAM HYDROBROMIDE 10 MG: 10 TABLET ORAL at 09:31

## 2021-11-09 RX ADMIN — STANDARDIZED SENNA CONCENTRATE 17.2 MG: 8.6 TABLET ORAL at 09:30

## 2021-11-09 RX ADMIN — GABAPENTIN 600 MG: 600 TABLET, FILM COATED ORAL at 00:47

## 2021-11-09 RX ADMIN — INSULIN LISPRO 1 UNITS: 100 INJECTION, SOLUTION INTRAVENOUS; SUBCUTANEOUS at 18:27

## 2021-11-09 RX ADMIN — ATORVASTATIN CALCIUM 40 MG: 40 TABLET, FILM COATED ORAL at 09:31

## 2021-11-09 RX ADMIN — BUPRENORPHINE AND NALOXONE 4 TABLET: 2; .5 TABLET SUBLINGUAL at 09:31

## 2021-11-09 RX ADMIN — LISINOPRIL 40 MG: 40 TABLET ORAL at 09:31

## 2021-11-09 RX ADMIN — SUGAMMADEX 100 MG: 100 INJECTION, SOLUTION INTRAVENOUS at 16:02

## 2021-11-09 RX ADMIN — GABAPENTIN 600 MG: 600 TABLET, FILM COATED ORAL at 20:59

## 2021-11-09 RX ADMIN — INSULIN LISPRO 10 UNITS: 100 INJECTION, SOLUTION INTRAVENOUS; SUBCUTANEOUS at 13:38

## 2021-11-09 RX ADMIN — PROPOFOL 50 MG: 10 INJECTION, EMULSION INTRAVENOUS at 15:50

## 2021-11-09 RX ADMIN — PHENYLEPHRINE HYDROCHLORIDE 100 MCG: 10 INJECTION INTRAVENOUS at 15:50

## 2021-11-09 RX ADMIN — PROPOFOL 100 MG: 10 INJECTION, EMULSION INTRAVENOUS at 15:29

## 2021-11-09 RX ADMIN — METRONIDAZOLE 500 MG: 500 INJECTION, SOLUTION INTRAVENOUS at 05:25

## 2021-11-09 RX ADMIN — EPHEDRINE SULFATE 10 MG: 50 INJECTION INTRAVENOUS at 15:50

## 2021-11-09 RX ADMIN — VANCOMYCIN HYDROCHLORIDE 2000 MG: 10 INJECTION, POWDER, LYOPHILIZED, FOR SOLUTION INTRAVENOUS at 18:45

## 2021-11-09 RX ADMIN — PHENYLEPHRINE HYDROCHLORIDE 100 MCG: 10 INJECTION INTRAVENOUS at 15:38

## 2021-11-09 RX ADMIN — INSULIN GLARGINE 20 UNITS: 100 INJECTION, SOLUTION SUBCUTANEOUS at 20:57

## 2021-11-09 RX ADMIN — FENTANYL CITRATE 100 MCG: 50 INJECTION, SOLUTION INTRAMUSCULAR; INTRAVENOUS at 15:28

## 2021-11-09 RX ADMIN — SODIUM CHLORIDE 25 ML: 9 INJECTION, SOLUTION INTRAVENOUS at 06:58

## 2021-11-09 RX ADMIN — ACETAMINOPHEN 650 MG: 325 TABLET ORAL at 20:59

## 2021-11-09 RX ADMIN — METOCLOPRAMIDE 10 MG: 5 INJECTION, SOLUTION INTRAMUSCULAR; INTRAVENOUS at 15:36

## 2021-11-09 RX ADMIN — ROCURONIUM BROMIDE 5 MG: 10 INJECTION INTRAVENOUS at 15:28

## 2021-11-09 RX ADMIN — SODIUM CHLORIDE, PRESERVATIVE FREE 10 ML: 5 INJECTION INTRAVENOUS at 00:47

## 2021-11-09 RX ADMIN — ONDANSETRON 4 MG: 2 INJECTION INTRAMUSCULAR; INTRAVENOUS at 16:00

## 2021-11-09 RX ADMIN — ENOXAPARIN SODIUM 40 MG: 100 INJECTION SUBCUTANEOUS at 09:32

## 2021-11-09 RX ADMIN — SODIUM CHLORIDE, PRESERVATIVE FREE 10 ML: 5 INJECTION INTRAVENOUS at 09:32

## 2021-11-09 RX ADMIN — INSULIN LISPRO 8 UNITS: 100 INJECTION, SOLUTION INTRAVENOUS; SUBCUTANEOUS at 12:05

## 2021-11-09 RX ADMIN — PROPOFOL 200 MG: 10 INJECTION, EMULSION INTRAVENOUS at 15:28

## 2021-11-09 RX ADMIN — PHENYLEPHRINE HYDROCHLORIDE 200 MCG: 10 INJECTION INTRAVENOUS at 16:04

## 2021-11-09 RX ADMIN — PHENYLEPHRINE HYDROCHLORIDE 200 MCG: 10 INJECTION INTRAVENOUS at 15:46

## 2021-11-09 RX ADMIN — INSULIN LISPRO 4 UNITS: 100 INJECTION, SOLUTION INTRAVENOUS; SUBCUTANEOUS at 12:09

## 2021-11-09 RX ADMIN — SODIUM CHLORIDE 25 ML: 9 INJECTION, SOLUTION INTRAVENOUS at 05:26

## 2021-11-09 RX ADMIN — PROPOFOL 50 MG: 10 INJECTION, EMULSION INTRAVENOUS at 15:56

## 2021-11-09 RX ADMIN — INSULIN LISPRO 4 UNITS: 100 INJECTION, SOLUTION INTRAVENOUS; SUBCUTANEOUS at 06:50

## 2021-11-09 RX ADMIN — INSULIN LISPRO 10 UNITS: 100 INJECTION, SOLUTION INTRAVENOUS; SUBCUTANEOUS at 00:47

## 2021-11-09 RX ADMIN — SODIUM CHLORIDE: 9 INJECTION, SOLUTION INTRAVENOUS at 16:07

## 2021-11-09 ASSESSMENT — PULMONARY FUNCTION TESTS
PIF_VALUE: 5
PIF_VALUE: 2
PIF_VALUE: 19
PIF_VALUE: 4
PIF_VALUE: 1
PIF_VALUE: 4
PIF_VALUE: 9
PIF_VALUE: 3
PIF_VALUE: 3
PIF_VALUE: 20
PIF_VALUE: 21
PIF_VALUE: 2
PIF_VALUE: 2
PIF_VALUE: 1
PIF_VALUE: 21
PIF_VALUE: 21
PIF_VALUE: 3
PIF_VALUE: 3
PIF_VALUE: 1
PIF_VALUE: 8
PIF_VALUE: 20
PIF_VALUE: 9
PIF_VALUE: 19
PIF_VALUE: 21
PIF_VALUE: 1
PIF_VALUE: 21
PIF_VALUE: 20
PIF_VALUE: 23
PIF_VALUE: 1
PIF_VALUE: 20
PIF_VALUE: 3
PIF_VALUE: 1
PIF_VALUE: 21
PIF_VALUE: 2
PIF_VALUE: 1
PIF_VALUE: 9
PIF_VALUE: 1
PIF_VALUE: 34
PIF_VALUE: 1
PIF_VALUE: 3
PIF_VALUE: 34
PIF_VALUE: 1
PIF_VALUE: 8
PIF_VALUE: 21
PIF_VALUE: 3
PIF_VALUE: 5
PIF_VALUE: 4
PIF_VALUE: 9
PIF_VALUE: 3
PIF_VALUE: 3
PIF_VALUE: 9
PIF_VALUE: 21
PIF_VALUE: 3
PIF_VALUE: 1
PIF_VALUE: 20
PIF_VALUE: 19
PIF_VALUE: 20
PIF_VALUE: 9
PIF_VALUE: 4

## 2021-11-09 ASSESSMENT — PAIN DESCRIPTION - PROGRESSION
CLINICAL_PROGRESSION: NOT CHANGED
CLINICAL_PROGRESSION: NOT CHANGED
CLINICAL_PROGRESSION: GRADUALLY IMPROVING

## 2021-11-09 ASSESSMENT — PAIN DESCRIPTION - ORIENTATION
ORIENTATION: LEFT

## 2021-11-09 ASSESSMENT — ENCOUNTER SYMPTOMS
TROUBLE SWALLOWING: 1
CONSTIPATION: 0
CHEST TIGHTNESS: 0
DIARRHEA: 0
COUGH: 0
SHORTNESS OF BREATH: 0
COLOR CHANGE: 1
FACIAL SWELLING: 1
ABDOMINAL PAIN: 0

## 2021-11-09 ASSESSMENT — PAIN SCALES - GENERAL
PAINLEVEL_OUTOF10: 0
PAINLEVEL_OUTOF10: 0
PAINLEVEL_OUTOF10: 2
PAINLEVEL_OUTOF10: 0
PAINLEVEL_OUTOF10: 3
PAINLEVEL_OUTOF10: 0
PAINLEVEL_OUTOF10: 3
PAINLEVEL_OUTOF10: 0
PAINLEVEL_OUTOF10: 0

## 2021-11-09 ASSESSMENT — PAIN DESCRIPTION - DESCRIPTORS
DESCRIPTORS: DISCOMFORT;SORE
DESCRIPTORS: BURNING

## 2021-11-09 ASSESSMENT — PAIN DESCRIPTION - FREQUENCY
FREQUENCY: INTERMITTENT
FREQUENCY: INTERMITTENT

## 2021-11-09 ASSESSMENT — PAIN DESCRIPTION - PAIN TYPE
TYPE: SURGICAL PAIN
TYPE: ACUTE PAIN
TYPE: ACUTE PAIN

## 2021-11-09 ASSESSMENT — PAIN DESCRIPTION - LOCATION
LOCATION: FACE

## 2021-11-09 ASSESSMENT — PAIN DESCRIPTION - ONSET
ONSET: ON-GOING
ONSET: GRADUAL

## 2021-11-09 ASSESSMENT — PAIN - FUNCTIONAL ASSESSMENT
PAIN_FUNCTIONAL_ASSESSMENT: ACTIVITIES ARE NOT PREVENTED
PAIN_FUNCTIONAL_ASSESSMENT: ACTIVITIES ARE NOT PREVENTED

## 2021-11-09 NOTE — H&P
Internal Medicine  History & Physical      CC left face abscess     History Obtained From:  patient    HISTORY OF PRESENT ILLNESS:    Naresh Urbina is a 35 y.o., male with PMH of poorly controlled diabetes, hypertension, depression presenting for large left facial abscess. Abscess began on 10/27; he went to the dentist and asked to have his tooth pulled. Due to this abscess dentist recommended 10 days of Augmentin. Patient finished course of Augmentin, return to dentist who conferred with oral maxillofacial surgery at [facility]. They recommended he continue with tooth extraction which occurred on 11/4. After extraction patient reports he had drainage from the wound in his mouth and then drainage from external wound beginning on Saturday. He says it is not painful but he does have a burning pain from wiping with paper towels and cleaning solution. He has had cellulitis/infections in the past.     Last A1c 10.4 on 9/24/2021. Patient denies fevers, chills, pain. No stiff neck. Past Medical History:        Diagnosis Date    Depression     Diabetes mellitus ([facility] [address]     Hyperlipidemia     Hypertension     Osteoarthritis of both knees        Past Surgical History:        Procedure Laterality Date    FOOT SURGERY Left 02/19/2018     INCISION AND DRAINAGE WITH HALLUX AMPUTATION LEFT FOOT    KNEE SURGERY Right 6/19/2013    TOE AMPUTATION Left     2nd toe    TOE AMPUTATION Right 9/26/2019    RIGHT HALLUX AMPUTATION WITH POSSIBLE 1ST RAY AMPUTATION performed by Faith Mensah DPM at [address]       Medications Prior to Admission:    Medications Prior to Admission: empagliflozin (JARDIANCE) 10 MG tablet, Take 10 mg by mouth daily  gabapentin (NEURONTIN) 600 MG tablet, Take 600 mg by mouth 3 times daily.   citalopram (CELEXA) 10 MG tablet, TAKE 1 TABLET BY MOUTH EVERY DAY  VORTIoxetine HBr (TRINTELLIX) 20 MG TABS tablet, Take 20 mg by mouth daily  hydrochlorothiazide (MICROZIDE) 12.5 MG capsule, Take 12.5 mg by disorder  Patient on 10 of Celexa and recently started on Trintellix. Reports taking both at home.  -Continue home meds    #Electrolyte derangements  -Pseudohyponatremia, NA corrects to 137. Code Status: full code  FEN: Diet NPO   PPX: lovenox  DISPO: pending    Hyun Ramirez MD  11/8/2021,  11:30 PM    This patient will be staffed and discussed with Dr. Taylor Rios DO. I saw the patient independently from the resident . I discussed the care with the resident. I personally reviewed the HPI, PH, FH, SH, ROS and medications. I repeated pertinent portions of the examination and reviewed the relevant imaging and laboratory data. I agree with the findings, assessment and plan as documented.  addition to: Plan as above

## 2021-11-09 NOTE — PROGRESS NOTES
Clinical Pharmacy Progress Note    Vancomycin - Management by Pharmacy    Consult Date(s): 11/9/21  Consulting Provider(s): Marguerite Phillips    Assessment / Plan    Sepsis 2/2 facial ascess - Vancomycin   Concurrent Antimicrobials: Flagyl   Day of Vanc Therapy: 1   Current Dosing Method: Bayesian-Guided AUC Dosing   Therapeutic Goal: 400-600 mg/L*hr   Current Dose / Frequency: Vancomycin 1500 mg every 12 hours   Plan / Rationale: Estimated AUC of 518 mg/L*hr with trough of 12.9 mg/L   Will continue to monitor clinical condition and make adjustments to regimen as appropriate. Thank you for consulting Pharmacy! Ilana Jennifer, [facility] 11/9/2021, 5:50 AM        Interval update:     Subjective/Objective: Mr. Mana Jerry is a 35 y.o. male with a PMHx significant for DMII, HTN, depression, admitted for left facial abscess. Abscess began 10/27. Dentist prescribed Augmentin 10 days. Upon completion of ABX patient underwent tooth extraction on 11/4. Patient reports drainage from external wound beginning on 11/6. Pharmacy has been consulted to dose vancomycin per Dr Marguerite Phillips. Height:   Ht Readings from Last 1 Encounters:   11/08/21 6' 2.5\" (1.892 m)     Weight:   Wt Readings from Last 1 Encounters:   11/08/21 299 lb (135.6 kg)       Current & Prior Antimicrobial Regimen(s):   Flagyl 500 mg IV every 8 hours   Vancomycin 1500 mg IV every 12 hours    Level(s) / Doses:    Date Time Dose Level / Type of Level Interpretation                 Note: Serum levels collected for AUC-based dosing may be high if collected in close proximity to the dose administered. This is not necessarily an indicator of toxicity. Cultures & Sensitivities:    Date Site Micro Susceptibility / Result                 Labs / Ancillary Data:    Estimated Creatinine Clearance: 156 mL/min (based on SCr of 0.9 mg/dL).     Recent Labs     11/08/21  1756 11/08/21  2159   CREATININE 1.0 0.9   BUN 37* 36*   WBC 11.8*  --        Additional Lab Values / Findings of Note:

## 2021-11-09 NOTE — PROGRESS NOTES
Patient has returned to floor from OR. Alert x4, able to make needs known. All safety precautions in place per POC, call light within reach.

## 2021-11-09 NOTE — ED PROVIDER NOTES
Emergency Department Encounter  [address]     Patient: Mana Jerry  MRN: [id number]  : 1980  Date of Evaluation: 2021  ED Provider: Dario Laird DO    Chief Complaint       Chief Complaint   Patient presents with    Abscess     left jaw     [place]     Mana Jerry is a 35 y.o. male who presents to the emergency department with Left Neck Soft Tissue abscess. Pt went to see dentist on 10/27/21 regarding chronic infected / broken tooth. He was placed on Augmentin at this time. He states that his abscess was actually bigger at that time than it is today. It has improved in size since then. On 21, he had his infected tooth pulled. The dentist thinks that the antibiotic did not work as well because of the infected tooth. The dentist was under the impression that the infected tooth led to the abscess in his neck. He contacted his PCP today, regarding the new draining of his neck abscess. He denies any fever or chills. He has chronic swallowing difficulties, but he has not new difficulty breathing or swallowing issues. However, the size of the mass is making eating complicated and he has had a decrease in food consumption. PCP recommended going to the ED for evaluation and antibiotics. Pt has hx of DM2. He takes Jardiance, Pioglitazone, Lispro 10 u w/ meal, 20 Lantus BID. Last A1C ~ 9.3%. ROS:     · History obtained from the patient  · CONSTITUTIONAL:  Neg Recent weight changes,fatigue,fever,chills or night sweats  · EYES:  Neg blurriness,tearing,itching or acute change in vision  · EARS:  Neg hearing loss,tinnitus,vertigo,discharge or earache. · NOSE:  Neg rhinorrhea,sneezing,itching,allergy or epistaxis  · MOUTH/THROAT:  Neg bleeding gums,hoarseness or sore throat.   · RESPIRATORY:  Neg SOB,wheeze,cough,sputum,hemoptysis or brnochitis  · CARDIOVASCULAR:  Neg chest pain,palpitations,dyspnea on exertion,orthopnea,paroxysmal nocturnal dyspnea or edema  · GASTROINTESTINAL: Neg nausea,vomiting,or diarrhea,indigestion,dysphagia,change in bowel movements, or abdominal pain. · GENITOURINARY:  Neg Urinary frequency, hesitancy, urgency, polyuria, dysuria, hematuria, nocturia, or incontinence. · HEMATOLOGIC/LYMPHATIC:  Neg Anemia,bleeding tendency  · MUSCULOSKELETAL:  Neg New myalgias,bone pain,joint pain,swelling or stiffness and has had no change in gait. · NEUROLOGICAL: Neg Loss of consciousness,memory loss, forgetfulness, periods of confusion, difficulty concentrating, seizures, decline in intellect, nervousness, insomina, aphasia, or dysarthria. · SKIN :  + Neck Mass with erythema and purulent drainage  · PSYCHIATRIC:  Neg depression,personality changes,anxiety. · ENDOCRINE:  Neg polydipsia,polyuria,abnormal weight changes,heat /cold intolerance. · ALL/IMM :  Neg  reactions to drugs other than listed,food,insects,skin rash,trouble breathing,local or general lymph node enlargement or tenderness.     Past History     Past Medical History:   Diagnosis Date    Depression     Diabetes mellitus ([facility] [address]     Hyperlipidemia     Hypertension     Osteoarthritis of both knees      Past Surgical History:   Procedure Laterality Date    FOOT SURGERY Left 02/19/2018     INCISION AND DRAINAGE WITH HALLUX AMPUTATION LEFT FOOT    KNEE SURGERY Right 6/19/2013    TOE AMPUTATION Left     2nd toe    TOE AMPUTATION Right 9/26/2019    RIGHT HALLUX AMPUTATION WITH POSSIBLE 1ST RAY AMPUTATION performed by Altaf Miller DPM at [address] [facility] [facility] History     Socioeconomic History    Marital status: Single     Spouse name: None    Number of children: None    Years of education: None    Highest education level: None   Occupational History    None   Tobacco Use    Smoking status: Never Smoker    Smokeless tobacco: Never Used   Vaping Use    Vaping Use: Never used   Substance and Sexual Activity    Alcohol use: No    Drug use: No     Types: Opiates      Comment: in recovery    Sexual activity: Never   Other Topics Concern    None   Social History Narrative    None     Social Determinants of Health     Financial Resource Strain:     Difficulty of Paying Living Expenses: Not on file   Food Insecurity:     Worried About Running Out of Food in the Last Year: Not on file    Jorge of Food in the Last Year: Not on file   Transportation Needs:     Lack of Transportation (Medical): Not on file    Lack of Transportation (Non-Medical): Not on file   Physical Activity:     Days of Exercise per Week: Not on file    Minutes of Exercise per Session: Not on file   Stress:     Feeling of Stress : Not on file   Social Connections:     Frequency of Communication with Friends and Family: Not on file    Frequency of Social Gatherings with Friends and Family: Not on file    Attends Bahai Services: Not on file    Active Member of [address] [place] [address] or Organizations: Not on file    Attends Club or Organization Meetings: Not on file    Marital Status: Not on file   Intimate Partner Violence:     Fear of Current or Ex-Partner: Not on file    Emotionally Abused: Not on file    Physically Abused: Not on file    Sexually Abused: Not on file   Housing Stability:     Unable to Pay for Housing in the Last Year: Not on file    Number of [place] in the Last Year: Not on file    Unstable Housing in the Last Year: Not on file       Medications/Allergies     Previous Medications    ATORVASTATIN (LIPITOR) 40 MG TABLET    Take 40 mg by mouth daily    BUPRENORPHINE-NALOXONE (SUBOXONE) 8-2 MG SUBL SL TABLET    Place 1 tablet under the tongue daily. CHOLECALCIFEROL (VITAMIN D3) 2000 UNITS CAPS    Take by mouth    CITALOPRAM (CELEXA) 10 MG TABLET    TAKE 1 TABLET BY MOUTH EVERY DAY    EMPAGLIFLOZIN (JARDIANCE) 10 MG TABLET    Take 10 mg by mouth daily    GABAPENTIN (NEURONTIN) 600 MG TABLET    Take 600 mg by mouth 3 times daily.     HYDROCHLOROTHIAZIDE (MICROZIDE) 12.5 MG CAPSULE    Take 12.5 mg by mouth daily INSULIN GLARGINE (LANTUS) 100 UNIT/ML INJECTION PEN    Inject 40 Units into the skin nightly    INSULIN LISPRO (HUMALOG) 100 UNIT/ML INJECTION VIAL    Inject 30 Units into the skin 3 times daily (before meals) May add insulin depending on sugar/    LISINOPRIL (PRINIVIL;ZESTRIL) 40 MG TABLET    Take 40 mg by mouth daily    NAPROXEN (NAPROSYN) 500 MG TABLET    Take 500 mg by mouth 2 times daily (with meals)    SENNA (SENOKOT) 8.6 MG TABLET    Take 2 tablets by mouth daily    VITAMIN D (ERGOCALCIFEROL) 1.25 MG (76772 UT) CAPS CAPSULE    TAKE 1 CAPSULE BY MOUTH ONE TIME A WEEK    VORTIOXETINE HBR (TRINTELLIX) 20 MG TABS TABLET    Take 20 mg by mouth daily     Allergies   Allergen Reactions    Cephalexin Shortness Of Breath        Physical Exam       ED Triage Vitals [11/08/21 1725]   BP Temp Temp src Pulse Resp SpO2 Height Weight   (!) 150/96 98.3 °F (36.8 °C) -- 103 14 100 % 6' 2.5\" (1.892 m) 299 lb (135.6 kg)     Physical Examination:   · General appearance: alert, appears stated age and cooperative  · Skin: Skin color, texture, turgor normal.   · HEENT: PERRLA Normocephalic, no lesions, without obvious abnormality. · Neck: No adenopathy, soft, supple, trachea midline; Large (expanding to midline) Left sided neck mass with erythema and purulent drainage, no tenderness   · Lungs: clear to auscultation bilaterally  · Heart: regular rate and rhythm, S1, S2 normal, no murmur  · Abdomen: soft, non-tender; bowel sounds normal; no masses,  no organomegaly  · Extremities: extremities normal, atraumatic, no cyanosis or edema  · Lymphatic: No significant lymph node enlargement papable  · Neurologic: CN II-XII grossly intact.   Mental status: Alert, oriented, thought content appropriate      Diagnostics   Labs:  Results for orders placed or performed during the hospital encounter of 11/08/21   CBC auto differential   Result Value Ref Range    WBC 11.8 (H) 4.0 - 11.0 K/uL    RBC 4.40 4.20 - 5.90 M/uL    Hemoglobin 11.6 (L) 13.5 - 17.5 g/dL    Hematocrit 36.0 (L) 40.5 - 52.5 %    MCV 81.8 80.0 - 100.0 fL    MCH 26.4 26.0 - 34.0 pg    MCHC 32.3 31.0 - 36.0 g/dL    RDW 14.5 12.4 - 15.4 %    Platelets 186 (H) 531 - 450 K/uL    MPV 7.4 5.0 - 10.5 fL    Neutrophils % 81.0 %    Lymphocytes % 11.4 %    Monocytes % 5.4 %    Eosinophils % 1.8 %    Basophils % 0.4 %    Neutrophils Absolute 9.5 (H) 1.7 - 7.7 K/uL    Lymphocytes Absolute 1.3 1.0 - 5.1 K/uL    Monocytes Absolute 0.6 0.0 - 1.3 K/uL    Eosinophils Absolute 0.2 0.0 - 0.6 K/uL    Basophils Absolute 0.0 0.0 - 0.2 K/uL   Comprehensive Metabolic Panel w/ Reflex to MG   Result Value Ref Range    Sodium 126 (L) 136 - 145 mmol/L    Potassium reflex Magnesium 5.7 (H) 3.5 - 5.1 mmol/L    Chloride 90 (L) 99 - 110 mmol/L    CO2 24 21 - 32 mmol/L    Anion Gap 12 3 - 16    Glucose 699 (HH) 70 - 99 mg/dL    BUN 37 (H) 7 - 20 mg/dL    CREATININE 1.0 0.9 - 1.3 mg/dL    GFR Non-African American >60 >60    GFR African American >60 >60    Calcium 9.7 8.3 - 10.6 mg/dL    Total Protein 7.9 6.4 - 8.2 g/dL    Albumin 3.8 3.4 - 5.0 g/dL    Albumin/Globulin Ratio 0.9 (L) 1.1 - 2.2    Total Bilirubin 0.3 0.0 - 1.0 mg/dL    Alkaline Phosphatase 96 40 - 129 U/L    ALT 10 10 - 40 U/L    AST 8 (L) 15 - 37 U/L     Radiographs:  XR CHEST (2 VW)    Result Date: 11/8/2021  EXAM: FRONTAL AND LATERAL CHEST RADIOGRAPH INDICATION: abscess, pain COMPARISON: 9/19/2016 FINDINGS: LINES/TUBES:None HEART / MEDIASTINUM: Cardiomediastinal silhouette is unremarkable. PLEURAL SPACES: No large pleural effusion or pneumothorax. LUNGS: No focal consolidation. BONES / SOFT TISSUES: No acute abnormality. OTHER: None. No evidence of acute cardiopulmonary disease. CT SOFT TISSUE NECK W CONTRAST    Result Date: 11/8/2021  EXAM: CT SOFT TISSUE NECK W CONTRAST INDICATION: Neck Abscess; COMPARISON: None. TECHNIQUE: Axial CT imaging obtained from the skull base through the lung apices.  Axial images and multiplanar reformatted images reviewed. Individualized dose optimization technique was used in order to meet ALARA standards for radiation dose reduction. In addition to vendor specific dose reduction algorithms, the dose reduction techniques vary based on the specific scanner utilized but frequently include automated exposure control, adjustment of the mA and/or kV according to patient size, and use of iterative reconstruction technique. IV Contrast: 80 mL Isovue-370 FINDINGS: There is a large ill-defined fluid collection centered along the inferior aspect of the left mandible measuring approximately 8.9 x 3.9 x 4.3 cm. There is mild associated mass effect with no significant airway narrowing. There is associated diffuse skin thickening. There are prominent bilateral cervical lymph nodes, likely reactive. There is no prevertebral soft tissue swelling. There is no evidence of retropharyngeal abscess. There are dental caries. The nasopharynx, oropharynx, and hypopharynx are otherwise unremarkable. Large ill-defined fluid collection centered along the inferior aspect of the left mandible measuring approximately 8.9 x 3.9 x 4.3 cm. No evidence of airway compromise. ED Course and MDM   In brief, Aj Rodriguez is a 35 y.o. male who presented to the emergency department with greater than 3 week hx of left neck abscess. Pt is hemodynamically stable. With the size of the abscess and location, ENT was consulted for surgical I&D. Pt requires IV antibiotics for treatment and therefore would need admission to the hospital.    Pt was signed out to night physician.      ED Medication Orders (From admission, onward)    Start Ordered     Status Ordering Provider    11/08/21 1900 11/08/21 1856  insulin regular (HUMULIN R;NOVOLIN R) injection 10 Units  ONCE         Last MAR action: Given - by Parker Downey on 11/08/21 at [address]    11/08/21 1831 11/08/21 1833  iopamidol (ISOVUE-370) 76 % injection 80 mL  IMG ONCE PRN         Last MAR action: Given - by [facility] on 11/08/21 at [address] [employer] A    11/08/21 1830 11/08/21 1825  piperacillin-tazobactam (ZOSYN) 4,500 mg in dextrose 5 % 100 mL IVPB (mini-bag)  ONCE        Question Answer Comment   Antimicrobial Indications Other    Other Abx Indication Suspected Sepsis of Skin or Soft Tissue Origin        Last MAR action: Stopped - by Delorise Sieving on 11/08/21 at [address] [employer] A    11/08/21 1830 11/08/21 1825  vancomycin (VANCOCIN) 2,000 mg in dextrose 5 % 500 mL IVPB  ONCE        Question Answer Comment   Antimicrobial Indications Other    Other Abx Indication Suspected Sepsis of Skin or Soft Tissue Origin        Acknowledged [employer] A    11/08/21 1830 11/08/21 1827  0.9 % sodium chloride bolus  ONCE         Last MAR action: New Bag - by Delorise Sieving on 11/08/21 at [facility]    11/08/21 1830 11/08/21 1827  0.9 % sodium chloride bolus  ONCE         Acknowledged LISA Ahumada          Final Impression      1. Abscess    2.  Folliculitis        DISPOSITION  : Decision  to [facility], [address] [place] [address] Medicine PGY-2     Marycarmen Melendez DO  Resident  11/08/21 8598

## 2021-11-09 NOTE — OP NOTE
Operative Note      Patient: Melissa Reyes  YOB: 1980  MRN: [id number]    Date of Procedure: 11/9/2021    Pre-Op Diagnosis: LEFT FACIAL ABSCESS    Post-Op Diagnosis: Same       Procedure(s):  LEFT FACIAL ABSCESS INCISION AND DRAINAGE    Surgeon(s):  Bird Craft MD    Assistant:   Resident: Zaheer Richardson DO    Anesthesia: General    Estimated Blood Loss (mL): 5    Complications: None    Specimens:   ID Type Source Tests Collected by Time Destination   1 : Left Facial Abcess Specimen Neck CULTURE, FUNGUS, CULTURE, SURGICAL, CULTURE WITH SMEAR, ACID FAST Laurence Geiger MD 11/9/2021 1518        Implants:  None      Drains:   Open Drain Inferior; Left Neck (Active)   Penrose Drain    Findings: Large multi-loculated abscess tracking superiorly over the mandible, medially toward midline and inferiorly over the neck, with over 30cc of foul smelling purulent drainage. Indications: The patient is a 61-year-old male with a history of poorly controlled type 2 Diabetes Mellitus who recently underwent extraction of an infected tooth. He continued to have drainage from within his mouth after the procedure, which eventually transitioned to erythema, swelling, and purulent drainage from the soft tissue overlying his left face and neck. CT scan revealed a large abscess. He was admitted to the medical service for IV antibiotics and glycemic control. After discussion of indications, risks, befits, and alternatives, the patient consented to surgical incision and drainage of the abscess. Detailed Description of Procedure: The patient was brought to the operating table and placed on the OR table in the supine position. He was secured appropriately and SCDs were confirmed functioning. After induction of general anesthesia and securing of the ET tube, the patient's left neck and face were prepped and draped in the standard fashion.     A timeout was performed according to hospital protocol. A 2 cm incision was made with a 15 blade overlying the most fluctuant area of the abscess below the mandible. Immediately, more than 20 cc of thick purulent drainage was expressed. A swab was used to sample the fluid and this was sent to lab for culture. Using a hemostat, the abscess cavity was opened in the medial direction, and additional copious amounts of evelyne-purulent drainage was expressed. Laterally, a large conglomerate of purulent tissue was encountered and was removed from the cavity with a hemostat. A finger was used to bluntly dissect in all directions within the abscess cavity, breaking up septations and loculations, allowing more drainage of purulent material. Over a liter of warm saline was then used to irrigate the abscess cavity until the output was clean. A penrose drain was then inserted within the cavity, ensuring all empty space was occupied, and sutured to the skin with Nylon. The surrounding area was cleaned and dried and the wound was dressed with fluffs, an ABD pad and medipore tape. The patient tolerated the procedure well without any immediate complications, was awakened from anesthesia and escorted to PACU in stable condition. All counts were correct x2 at the end of the procedure. Dr. Dia Pelayo was present for the entire operation and directed its course from beginning to end.      Electronically signed by Tory Castellano DO on 11/9/2021 at 4:40 PM

## 2021-11-09 NOTE — PROGRESS NOTES
4 Eyes Admission Assessment     I agree as the admission nurse that 2 RN's have performed a thorough Head to Toe Skin Assessment on the patient. ALL assessment sites listed below have been assessed on admission. Areas assessed by both nurses: ***  [x]   Head, Face, and Ears   [x]   Shoulders, Back, and Chest  [x]   Arms, Elbows, and Hands   [x]   Coccyx, Sacrum, and Ischium  [x]   Legs, Feet, and Heels        Does the Patient have Skin Breakdown?   {Blank single:82029::\"No\",\"Yes a wound was noted on the Admission Assessment and an LDA was Initiated documentation include the Cyn-wound, Wound Assessment, Measurements, Dressing Treatment, Drainage, and Color\",\"}         Andrei Prevention initiated:  {YES/NO:19732}   Wound Care Orders initiated:  {YES/NO:19732}      [facility] nurse consulted for Pressure Injury (Stage 3,4, Unstageable, DTI, NWPT, and Complex wounds) or Andrei score 18 or lower:  {YES/NO:19732}      Nurse 1 eSignature: Electronically signed by Jose Harris RN on 11/9/21 at 7:59 AM EST    **SHARE this note so that the co-signing nurse is able to place an eSignature**    Nurse 2 eSignature: {Esignature:[id number]}

## 2021-11-09 NOTE — PROGRESS NOTES
Clinical Pharmacy Progress Note    Vancomycin - Management by Pharmacy    Consult Date(s): 11/9/21  Consulting Provider(s): Zuleyka Randall    Assessment / Plan    Sepsis 2/2 facial ascess - Vancomycin  Concurrent Antimicrobials: Flagyl  Day of Vanc Therapy: 2  Current Dosing Method: Bayesian-Guided AUC Dosing  Therapeutic Goal: 400-600 mg/L*hr  Current Dose / Frequency: Vancomycin 1500 mg every 12 hours   Random level this AM = 11.9mcg/mL. Estimates AUC = 382 mg/L*hr (subtherapeutic). Plan / Rationale: Will increase dose to 2000mg IV q12h. Regimen estimates AUC of 510 mg/L*hr with trough of 11.3 mg/L  Will continue to monitor clinical condition and make adjustments to regimen as appropriate. Thank you for consulting Pharmacy! Luigi Mckinney, PharmD Candidate 2022 11/9/2021  10:55 AM          Interval update:   Planning for surgical I&D this afternoon. Subjective/Objective: Mr. Bolivar Linton is a 35 y.o. male with a PMHx significant for DMII, HTN, depression, admitted for left facial abscess. Abscess began 10/27. Dentist prescribed Augmentin 10 days. Upon completion of ABX patient underwent tooth extraction on 11/4. Patient reports drainage from external wound beginning on 11/6. Pharmacy has been consulted to dose vancomycin per Dr Zuleyka Randall. Height:   Ht Readings from Last 1 Encounters:   11/08/21 6' 2.5\" (1.892 m)     Weight:   Wt Readings from Last 1 Encounters:   11/08/21 299 lb (135.6 kg)       Current & Prior Antimicrobial Regimen(s):  Flagyl 500 mg IV every 8 hours  Vancomycin 2000 mg IV every 12 hours    Level(s) / Doses:    Date Time Dose Level / Type of Level Interpretation   11/08/21 2014 2000 mg ONCE     11/09/21 0917 1500 mg q12h  Random level: 11.9 as of 11/09 0521  AUC was subtherapeutic (382). Dose was increased to 2000 mg q12h for a target AUC of 400-600. Note: Serum levels collected for AUC-based dosing may be high if collected in close proximity to the dose administered.  This is not necessarily an indicator of toxicity. Cultures & Sensitivities:    Date Site Micro Susceptibility / Result                 Labs / Ancillary Data:    Estimated Creatinine Clearance: 201 mL/min (A) (based on SCr of 0.7 mg/dL (L)).     Recent Labs     11/08/21  1756 11/08/21  2159 11/09/21  0521   CREATININE 1.0 0.9 0.7*   BUN 37* 36* 26*   WBC 11.8*  --  10.1       Additional Lab Values / Findings of Note:

## 2021-11-09 NOTE — CONSULTS
Otolaryngology/ ENT Surgery   Resident Consult Note    Reason for Consult: Left facial abscess    History of Present Illness:   Celestina Wolfe is a 35 y.o. male with Hx of poorly controlled diabetes, hyperlipidemia, hypertension and opioid use disorder who was admitted for left facial abscess status post 10-day course of Augmentin followed by tooth extraction on 11/4. Patient states symptoms started around 10/20. He went to seen emergency dentist, started Augmentin, and saw the dentist again after course completion day 10. His tooth was extracted, then immediately he reports the abscess started draining into his mouth. Saturday started draining through pores in skin of face, less output into the mouth now. The patient reports a tight feeling and restricted range of motion of his jaw, but denies pain, only taking Tylenol. He denies any difficulty swallowing or difficulty breathing or change in the sound of his voice. The patient is recovering from opioid drug use disorder, and has been taking suboxone for 6 years. The patient notes infection associated with this same tooth back in July requiring hospitalization at  due to severe encephalopathy and hyperglycemia in the 500's. The patient reports multiple diabetic foot infections s/p multiple toe amputations, and is s/p TMA on the right (March 2021) and left foot has digits 3, 4, 5 remaining. He also reports hx MRSA bacteremia secondary to foot infections. The patient was diagnosed with diabetes around 2008 with severe type 2 diabetes. He is insulin-dependent, 10u lispro with meals and lantus 20 units BID. His home glucose is usually 150-220's. Last A1c was 10.4 on 9/24/2021.     Past Medical History:        Diagnosis Date    Depression     Diabetes mellitus ([facility] [address]     Hyperlipidemia     Hypertension     Osteoarthritis of both knees        Past Surgical History:        Procedure Laterality Date    FOOT SURGERY Left 02/19/2018     INCISION AND DRAINAGE WITH HALLUX AMPUTATION LEFT FOOT    KNEE SURGERY Right 6/19/2013    TOE AMPUTATION Left     2nd toe    TOE AMPUTATION Right 9/26/2019    RIGHT HALLUX AMPUTATION WITH POSSIBLE 1ST RAY AMPUTATION performed by Jamal Redmond DPM at [facility] OR       Allergies:  Cephalexin    Medications:   Home Meds  No current facility-administered medications on file prior to encounter. Current Outpatient Medications on File Prior to Encounter   Medication Sig Dispense Refill    empagliflozin (JARDIANCE) 10 MG tablet Take 10 mg by mouth daily      gabapentin (NEURONTIN) 600 MG tablet Take 600 mg by mouth 3 times daily.  citalopram (CELEXA) 10 MG tablet TAKE 1 TABLET BY MOUTH EVERY DAY      VORTIoxetine HBr (TRINTELLIX) 20 MG TABS tablet Take 20 mg by mouth daily      hydrochlorothiazide (MICROZIDE) 12.5 MG capsule Take 12.5 mg by mouth daily      Cholecalciferol (VITAMIN D3) 2000 units CAPS Take by mouth      insulin glargine (LANTUS) 100 UNIT/ML injection pen Inject 40 Units into the skin nightly (Patient taking differently: Inject 30 Units into the skin 2 times daily ) 5 pen 3    atorvastatin (LIPITOR) 40 MG tablet Take 40 mg by mouth daily      buprenorphine-naloxone (SUBOXONE) 8-2 MG SUBL SL tablet Place 1 tablet under the tongue daily.        lisinopril (PRINIVIL;ZESTRIL) 40 MG tablet Take 40 mg by mouth daily      vitamin D (ERGOCALCIFEROL) 1.25 MG (54947 UT) CAPS capsule TAKE 1 CAPSULE BY MOUTH ONE TIME A WEEK      naproxen (NAPROSYN) 500 MG tablet Take 500 mg by mouth 2 times daily (with meals)      senna (SENOKOT) 8.6 MG tablet Take 2 tablets by mouth daily      insulin lispro (HUMALOG) 100 UNIT/ML injection vial Inject 30 Units into the skin 3 times daily (before meals) May add insulin depending on sugar/         Current Meds  vancomycin (VANCOCIN) 1,500 mg in dextrose 5 % 250 mL IVPB, Q12H  insulin lispro (1 Unit Dial) 0-12 Units, Q4H  insulin lispro (1 Unit Dial) 0-6 Units, Q4H  levoFLOXacin (LEVAQUIN) 500 MG/100ML infusion 500 mg, Q24H  0.9 % sodium chloride bolus, Once  atorvastatin (LIPITOR) tablet 40 mg, Daily  buprenorphine-naloxone (SUBOXONE) 2-0.5 MG SL tablet 4 tablet, Daily  citalopram (CELEXA) tablet 10 mg, Daily  gabapentin (NEURONTIN) tablet 600 mg, TID  [Held by provider] insulin lispro (1 Unit Dial) 30 Units, TID AC  lisinopril (PRINIVIL;ZESTRIL) tablet 40 mg, Daily  senna (SENOKOT) tablet 17.2 mg, Daily  VORTIoxetine (TRINTELLIX) tablet 20 mg, Daily  sodium chloride flush 0.9 % injection 5-40 mL, 2 times per day  sodium chloride flush 0.9 % injection 5-40 mL, PRN  0.9 % sodium chloride infusion, PRN  enoxaparin (LOVENOX) injection 40 mg, Daily  ondansetron (ZOFRAN-ODT) disintegrating tablet 4 mg, Q8H PRN   Or  ondansetron (ZOFRAN) injection 4 mg, Q6H PRN  polyethylene glycol (GLYCOLAX) packet 17 g, Daily PRN  acetaminophen (TYLENOL) tablet 650 mg, Q6H PRN   Or  acetaminophen (TYLENOL) suppository 650 mg, Q6H PRN  glucose (GLUTOSE) 40 % oral gel 15 g, PRN  dextrose 50 % IV solution, PRN  glucagon (rDNA) injection 1 mg, PRN  dextrose 5 % solution, PRN  insulin glargine (LANTUS;BASAGLAR) injection pen 20 Units, BID        Family History:   History reviewed. No pertinent family history. Social History:   TOBACCO:   reports that he has never smoked. He has never used smokeless tobacco.  ETOH:   reports no history of alcohol use. DRUGS:   reports no history of drug use. Review of Systems:   A 14 point review of systems was conducted and all pertinent positives and negatives were included in the HPI.     Physical exam:    Vitals:    11/08/21 1725 11/08/21 2235 11/09/21 0103 11/09/21 0437   BP: (!) 150/96 (!) 152/92 134/82 107/66   Pulse: 103 96 90 85   Resp: 14 16 16 18   Temp: 98.3 °F (36.8 °C) 98.7 °F (37.1 °C) 98.4 °F (36.9 °C) 98.8 °F (37.1 °C)   TempSrc:  Oral Oral Oral   SpO2: 100% 97% 96% 97%   Weight: 299 lb (135.6 kg)      Height: 6' 2.5\" (1.892 m)          General appearance: alert, no acute distress, grooming appropriate, no change in voice  Head: [see photo below] large area of erythema, induration, fluctuance at the left mandibular area with purulent drainage from multiple punctate sites, crusting present, induration causes midline, invades posteriorly but stops before the carotid  Eyes: No scleral icterus, EOM grossly intact  Neck: trachea midline, no JVD, no lymphadenopathy, neck supple, no stridor  Chest/Lungs: Equal excursion bilaterally, normal effort with no accessory muscle use on RA  Cardiovascular: RRR, brisk capillary refill distally  Abdomen: Soft, non-tender, non-distended, no rebound, guarding, or rigidity. Skin: warm and dry, no rashes  Extremities: no edema, no cyanosis  Neuro: A&Ox3, no focal deficits, sensation intact            Labs:    CBC:   Recent Labs     11/08/21 1756 11/09/21  0521   WBC 11.8* 10.1   HGB 11.6* 10.6*   HCT 36.0* 32.4*   MCV 81.8 80.5   * 407     BMP:   Recent Labs     11/08/21 1756 11/08/21  2159 11/09/21  0521   * 131* 133*   K 5.7* 4.6 4.1   CL 90* 96* 99   CO2 24 23 21   BUN 37* 36* 26*   CREATININE 1.0 0.9 0.7*     PT/INR: No results for input(s): PROTIME, INR in the last 72 hours. APTT: No results for input(s): APTT in the last 72 hours. Liver Profile:   Lab Results   Component Value Date    AST 8 11/08/2021    ALT 10 11/08/2021    BILITOT 0.3 11/08/2021    ALKPHOS 96 11/08/2021   No results found for: CHOL, HDL, TRIG  UA: No results found for: NITRITE, COLORU, PHUR, LABCAST, WBCUA, RBCUA, MUCUS, TRICHOMONAS, YEAST, BACTERIA, CLARITYU, SPECGRAV, LEUKOCYTESUR, UROBILINOGEN, BILIRUBINUR, BLOODU, GLUCOSEU, AMORPHOUS    Imaging:   CT SOFT TISSUE NECK W CONTRAST   Final Result      Large ill-defined fluid collection centered along the inferior aspect of the left mandible measuring approximately 8.9 x 3.9 x 4.3 cm. No evidence of airway compromise. XR CHEST (2 VW)   Final Result      No evidence of acute cardiopulmonary disease. Assessment/Plan: This is a 35 y.o. male with Hx of poorly controlled diabetes, hyperlipidemia, hypertension and opioid use disorder who was admitted for left facial abscess status post 10-day course of Augmentin followed by tooth extraction on 11/4. The patient is afebrile and hemodynamically stable. Patient is without leukocytosis, WBC is 10.1, sodium is 133. Blood sugars have been 296-400 since 11/8 with last A1c 10.4. CT showed poorly defined fluid collection without matured wall along inferior portion of the left mandible measuring 8.9 x 3.9 x 4.3 cm without evidence of airway compromise.     -Patient needs optimization of blood glucose, may need insulin drip however there is room to increase long-acting and short acting insulin regimens  -Continue vancomycin and Levaquin, patient has allergy to Keflex  -Patient amenable to surgical incision and drainage. Plan to go to the OR today, will be pre-opped    Patient discussed with Dr. Ernestina Hart.     Hugh Zamudio DO  11/09/21  7:33 AM

## 2021-11-09 NOTE — PROGRESS NOTES
ENT Corey. Lacey Henry 77 asked to transition all medications in D5 to NS as patient sugars out of control, last glucose 306 with large subcutaneous underlying infection. Needs better glucose control. Medicine asked to reevaluate.     Maira Hartman MD   Gen Surg PGY 4  11/9/2021  1:29 PM

## 2021-11-09 NOTE — PROGRESS NOTES
PACU Transfer to Floor Note # [id number]    LEFT FACIAL Deven Lee - Left    Dr Nahed Davis    Current Allergies: Cephalexin    Pt meets criteria as per Arnol Score and ASPAN Standards to transfer to next phase of care. Recent Labs     11/09/21  1439 11/09/21  1633   POCGLU 220* 191*       Vitals:    11/09/21 1730   BP: (!) 98/55   Pulse: 78   Resp: 16   Temp: 97.3 °F (36.3 °C)   SpO2: 99%     Vitals within 20% of pt's admission vitals as per ARNOL SCORE    SpO2: 99 %    Room air    Intake/Output Summary (Last 24 hours) at 11/9/2021 1733  Last data filed at 11/9/2021 1715  Gross per 24 hour   Intake 1535 ml   Output 2325 ml   Net -790 ml       Pain assessment:  none    Pain Level: 0    Patient was assessed for alterations to skin integrity. There were not alterations observed.     Is patient incontinent: no    Handoff report given by phone to Mack Felipe RN   Family will be updated by patient will text them      11/9/2021 5:33 PM

## 2021-11-09 NOTE — PROGRESS NOTES
PRE-OP NOTE  Department of Surgery      Chief Complaint or Reason for Surgery: L facial Abscess    Procedure: Incision and Drainage of Left Facial Abscess  Expected time: 1500 Today    Plan  1. Diet: NPO since midnight  2. IVF: per primary team  3. Antibiotics: Schedule Levaquin and Vancomycin  4. Labs to be drawn: Type and Screen, INR  5. Anesthesia: to see patient  6. Consent: will obtain and place on chart  7. Pulmonary: CXR: 11/08 no acute cardiopulmonary disease   8.  Cardiac: EKG: ordered      Courtney Anders DO  11/09/21  9:41 AM

## 2021-11-09 NOTE — ANESTHESIA POSTPROCEDURE EVALUATION
Department of Anesthesiology  Postprocedure Note    Patient: Jeff Canales  MRN: [id number]  YOB: 1980  Date of evaluation: 11/9/2021  Time:  5:51 PM     Procedure Summary     Date: 11/09/21 Room / Location: [facility]    Anesthesia Start: 7366 Anesthesia Stop: 4423    Procedure: LEFT FACIAL ABSCESS INCISION AND DRAINAGE (Left Neck) Diagnosis: (LEFT FACIAL ABSCESS)    Surgeons: Francisca Blank MD Responsible Provider: Bon Higginbotham DO    Anesthesia Type: general ASA Status: 3 - Emergent          Anesthesia Type: general    Nasima Phase I: Nasima Score: 10    Nasima Phase II:      Last vitals: Reviewed and per EMR flowsheets.        Anesthesia Post Evaluation    Patient location during evaluation: PACU  Patient participation: complete - patient participated  Level of consciousness: awake and alert  Pain score: 0  Airway patency: patent  Nausea & Vomiting: no nausea and no vomiting  Cardiovascular status: blood pressure returned to baseline  Respiratory status: acceptable  Hydration status: euvolemic

## 2021-11-09 NOTE — PROGRESS NOTES
LEFT FACIAL ABSCESS INCISION AND DRAINAGE - Left    Dr Audra Garcia    Current Allergies: Cephalexin    Recent Labs     11/09/21  1327 11/09/21  1439   POCGLU 261* 220*       Admitted to PACU bed 10 from OR. Arrived on a bed . Attached to PACU monitoring system. Alarms and parameters set. Report received from anesthesia Marta Ahn. OR staff did not report skin issues that were observed while in OR  No problems reported intraoperatively. Pt arrived with oxygen per nasal cannula with oxygen at 3 liters. Athrombic wraps in place. Wearing his own socks    Doctors aware of all labs before coming to recovery.

## 2021-11-09 NOTE — CARE COORDINATION
Patient from home. No needs at discharge. Patient came to ED with facial abscess. Patient hx of DM with poor control. Patient showing left lower molar as source of abscess with tooth removal on November 4, 2021. Weston Jhaveri Patient on IV antibiotics with Otolaryngology/ENT following. Patient may need more IV abx during hospital stay. CM will continue to follow patient until discharge.   Electronically signed by Dyana Castillo RN on 11/9/2021 at 12:40 PM

## 2021-11-09 NOTE — ANESTHESIA PRE PROCEDURE
Department of Anesthesiology  Preprocedure Note       Name:  Josh Kingston   Age:  35 y.o.  :  1980                                          MRN:  [id number]         Date:  2021      Surgeon: Philip Alvarez):  Malika Rios MD    Procedure: Procedure(s):  LEFT FACIAL ABSCESS INCISION AND DRAINAGE    Medications prior to admission:   Prior to Admission medications    Medication Sig Start Date End Date Taking? Authorizing Provider   empagliflozin (JARDIANCE) 10 MG tablet Take 10 mg by mouth daily 21  Yes Historical Provider, MD   gabapentin (NEURONTIN) 600 MG tablet Take 600 mg by mouth 3 times daily. 10/13/21  Yes Historical Provider, MD   citalopram (CELEXA) 10 MG tablet TAKE 1 TABLET BY MOUTH EVERY DAY 10/26/21  Yes Historical Provider, MD   VORTIoxetine HBr (TRINTELLIX) 20 MG TABS tablet Take 20 mg by mouth daily   Yes Historical Provider, MD   hydrochlorothiazide (MICROZIDE) 12.5 MG capsule Take 12.5 mg by mouth daily   Yes Historical Provider, MD   Cholecalciferol (VITAMIN D3) 2000 units CAPS Take by mouth   Yes Historical Provider, MD   insulin glargine (LANTUS) 100 UNIT/ML injection pen Inject 40 Units into the skin nightly  Patient taking differently: Inject 30 Units into the skin 2 times daily  18  Yes Jessica Cleveland MD   atorvastatin (LIPITOR) 40 MG tablet Take 40 mg by mouth daily   Yes Historical Provider, MD   buprenorphine-naloxone (SUBOXONE) 8-2 MG SUBL SL tablet Place 1 tablet under the tongue daily.     Yes Historical Provider, MD   lisinopril (PRINIVIL;ZESTRIL) 40 MG tablet Take 40 mg by mouth daily   Yes Historical Provider, MD   vitamin D (ERGOCALCIFEROL) 1.25 MG (07839 UT) CAPS capsule TAKE 1 CAPSULE BY MOUTH ONE TIME A WEEK 10/19/21   Historical Provider, MD   naproxen (NAPROSYN) 500 MG tablet Take 500 mg by mouth 2 times daily (with meals)    Historical Provider, MD   senna (SENOKOT) 8.6 MG tablet Take 2 tablets by mouth daily    Historical Provider, MD   insulin lispro (HUMALOG) 100 UNIT/ML injection vial Inject 30 Units into the skin 3 times daily (before meals) May add insulin depending on sugar/    Historical Provider, MD       Current medications:    Current Facility-Administered Medications   Medication Dose Route Frequency Provider Last Rate Last Admin    insulin lispro (1 Unit Dial) 0-12 Units  0-12 Units SubCUTAneous Q4H Ronit Espinoza MD   8 Units at 11/09/21 1205    insulin lispro (1 Unit Dial) 0-6 Units  0-6 Units SubCUTAneous Q4H Ronit Espinoza MD   4 Units at 11/09/21 1209    levoFLOXacin (LEVAQUIN) 500 MG/100ML infusion 500 mg  500 mg IntraVENous Q24H Jazmin Spear MD   Stopped at 11/09/21 0902    vancomycin (VANCOCIN) 2,000 mg in sodium chloride 0.9 % 500 mL IVPB  2,000 mg IntraVENous Q12H Ronit Espinoza MD        diphenhydrAMINE (BENADRYL) injection 25 mg  25 mg IntraVENous Q6H PRN Nichelle Ryan MD        fentaNYL (SUBLIMAZE) injection 25 mcg  25 mcg IntraVENous Q5 Min PRN Angel Luis Ontiveros, DO        fentaNYL (SUBLIMAZE) injection 50 mcg  50 mcg IntraVENous Q5 Min PRN Angel Luis Ontiveros,         HYDROmorphone (DILAUDID) injection 0.25 mg  0.25 mg IntraVENous Q5 Min PRN Angel Luis Ontiveros, DO        HYDROmorphone (DILAUDID) injection 0.5 mg  0.5 mg IntraVENous Q5 Min PRN Angel Luis Ontiveros, DO        oxyCODONE-acetaminophen (PERCOCET) 5-325 MG per tablet 1 tablet  1 tablet Oral Once PRN Angel Luis Ontiveros, DO        ondansetron [facility]) injection 4 mg  4 mg IntraVENous Once PRN Angel Luis Ontiveros,         promethazine (PHENERGAN) injection 6.25 mg  6.25 mg IntraMUSCular Once PRN Angel Luis Ontiveros,         labetalol (NORMODYNE;TRANDATE) injection 5 mg  5 mg IntraVENous Q10 Min PRN Angel Luis Ontiveros,         hydrALAZINE (APRESOLINE) injection 5 mg  5 mg IntraVENous Q10 Min PRN Angel Luis Ontiveros, DO        meperidine (DEMEROL) injection 12.5 mg  12.5 mg IntraVENous Q5 Min PRN Nupur Christyne Cranker,         0.9 % sodium chloride bolus  1,000 mL IntraVENous Once Serg Kimbrough MD   Patient Transferred to Other Facility at 11/09/21 0054    atorvastatin (LIPITOR) tablet 40 mg  40 mg Oral Daily Ginny Baum MD   40 mg at 11/09/21 0931    buprenorphine-naloxone (SUBOXONE) 2-0.5 MG SL tablet 4 tablet  4 tablet SubLINGual Daily Ginny Baum MD   4 tablet at 11/09/21 0931    citalopram (CELEXA) tablet 10 mg  10 mg Oral Daily Ginny Baum MD   10 mg at 11/09/21 0931    gabapentin (NEURONTIN) tablet 600 mg  600 mg Oral TID Ginny Baum MD   600 mg at 11/09/21 0930    [Held by provider] insulin lispro (1 Unit Dial) 30 Units  30 Units SubCUTAneous TID AC Ginny Baum MD        lisinopril (PRINIVIL;ZESTRIL) tablet 40 mg  40 mg Oral Daily Ginny Baum MD   40 mg at 11/09/21 0931    senna (SENOKOT) tablet 17.2 mg  2 tablet Oral Daily Ginny Baum MD   17.2 mg at 11/09/21 0930    VORTIoxetine (TRINTELLIX) tablet 20 mg  20 mg Oral Daily Ginny Baum MD        sodium chloride flush 0.9 % injection 5-40 mL  5-40 mL IntraVENous 2 times per day Ginny Baum MD   10 mL at 11/09/21 0932    sodium chloride flush 0.9 % injection 5-40 mL  5-40 mL IntraVENous PRN Ginny Baum MD        0.9 % sodium chloride infusion  25 mL IntraVENous PRN Ginny Baum MD 25 mL/hr at 11/09/21 0915 25 mL at 11/09/21 0915    enoxaparin (LOVENOX) injection 40 mg  40 mg SubCUTAneous Daily Ginny Baum MD   40 mg at 11/09/21 0932    ondansetron (ZOFRAN-ODT) disintegrating tablet 4 mg  4 mg Oral Q8H PRN Ginny Baum MD        Or    ondansetron [facility]) injection 4 mg  4 mg IntraVENous Q6H PRN Ginny Baum MD        polyethylene glycol [facility]) packet 17 g  17 g Oral Daily PRN Ivdontae Baum, MD        acetaminophen (TYLENOL) tablet 650 mg  650 mg Oral Q6H PRN Ginny Baum MD   650 mg at 11/09/21 0549    Or    acetaminophen (TYLENOL) suppository 650 mg  650 mg Rectal Q6H PRN Matthew Almanzar MD        glucose (GLUTOSE) 40 % oral gel 15 g  15 g Oral PRN Matthew Almanzar MD        dextrose 50 % IV solution  12.5 g IntraVENous PRN Matthew Almanzar MD        glucagon (rDNA) injection 1 mg  1 mg IntraMUSCular PRN Matthew Almanzar MD        dextrose 5 % solution  100 mL/hr IntraVENous PRN Matthew Almanzar MD        insulin glargine (LANTUS;BASAGLAR) injection pen 20 Units  20 Units SubCUTAneous BID Matthew Almanzar MD   20 Units at 11/09/21 9365     Facility-Administered Medications Ordered in Other Encounters   Medication Dose Route Frequency Provider Last Rate Last Admin    propofol injection   IntraVENous PRN Roel Sierra, APRN - CRNA   100 mg at 11/09/21 1529    rocuronium (ZEMURON) injection   IntraVENous PRN Roel Sierra, APRN - CRNA   5 mg at 11/09/21 1528    succinylcholine (ANECTINE) injection   IntraVENous PRN Roel Sierra, APRN - CRNA   200 mg at 11/09/21 1528    fentaNYL (SUBLIMAZE) injection   IntraVENous PRN Roel Sierra, APRN - CRNA   100 mcg at 11/09/21 1528    famotidine (PEPCID) injection   IntraVENous PRN Roel Sierra, APRN - CRNA   20 mg at 11/09/21 1536    metoclopramide (REGLAN) injection   IntraVENous PRN Roel Sierra, APRN - CRNA   10 mg at 11/09/21 1536    0.9 % sodium chloride infusion   IntraVENous Continuous PRN Roel Sierra, APRN - CRNA   New Bag at 11/09/21 1521    phenylephrine (VAZCULEP) injection   IntraVENous PRN Roel Sierra, APRN - CRNA   100 mcg at 11/09/21 1542       Allergies:     Allergies   Allergen Reactions    Cephalexin Shortness Of Breath       Problem List:    Patient Active Problem List   Diagnosis Code    Gangrene of toe of left foot ([facility] I96    Type 2 diabetes mellitus ([facility] E11.9    Essential hypertension I10    Obesity, Class III, BMI 40-49.9 (morbid obesity) ([facility] E66.01    Diabetic foot infection (HCC) E11.628, L08.9    Toe osteomyelitis, left ([facility]) M86.9    Toe necrosis ([facility] I96    Diabetic polyneuropathy ([facility]) E11.42    Facial abscess L02.01       Past Medical History:        Diagnosis Date    Depression     [facility]     Hyperlipidemia     Hypertension     Osteoarthritis of both knees        Past Surgical History:        Procedure Laterality Date    FOOT SURGERY Left 02/19/2018     INCISION AND DRAINAGE WITH HALLUX AMPUTATION LEFT FOOT    KNEE SURGERY Right 6/19/2013    TOE AMPUTATION Left     2nd toe    TOE AMPUTATION Right 9/26/2019    RIGHT HALLUX AMPUTATION WITH POSSIBLE 1ST RAY AMPUTATION performed by Trupti Baker DPM at [address]  History:    Social History     Tobacco Use    Smoking status: Never Smoker    Smokeless tobacco: Never Used   Substance Use Topics    Alcohol use: No                                Counseling given: Not Answered      Vital Signs (Current):   Vitals:    11/09/21 0437 11/09/21 0911 11/09/21 1156 11/09/21 1327   BP: 107/66 135/85 121/77    Pulse: 85 82 92 88   Resp: 18 18 18    Temp: 98.8 °F (37.1 °C) 98.6 °F (37 °C) 99.1 °F (37.3 °C)    TempSrc: Oral Oral Oral    SpO2: 97%  97%    Weight:       Height:                                                  BP Readings from Last 3 Encounters:   11/09/21 121/77   11/09/21 (!) 83/40   09/28/19 (!) 135/90       NPO Status:                                                                                 BMI:   Wt Readings from Last 3 Encounters:   11/08/21 299 lb (135.6 kg)   09/24/19 (!) 345 lb (156.5 kg)   02/18/18 (!) 325 lb (147.4 kg)     Body mass index is 37.88 kg/m².     CBC:   Lab Results   Component Value Date    WBC 10.1 11/09/2021    RBC 4.02 11/09/2021    HGB 10.6 11/09/2021    HCT 32.4 11/09/2021    MCV 80.5 11/09/2021    RDW 14.3 11/09/2021     11/09/2021       CMP:   Lab Results   Component Value Date     11/09/2021    K 4.1 11/09/2021    CL 99 11/09/2021    CO2 21 11/09/2021    BUN 26 11/09/2021    CREATININE 0.7 11/09/2021    GFRAA >60 11/09/2021    AGRATIO 0.9 11/08/2021    LABGLOM >60 11/09/2021    GLUCOSE 296 11/09/2021    PROT 7.9 11/08/2021    CALCIUM 8.7 11/09/2021    BILITOT 0.3 11/08/2021    ALKPHOS 96 11/08/2021    AST 8 11/08/2021    ALT 10 11/08/2021       POC Tests:   Recent Labs     11/09/21  1439   POCGLU 220*       Coags:   Lab Results   Component Value Date    PROTIME 12.4 11/09/2021    INR 1.09 11/09/2021       HCG (If Applicable): No results found for: PREGTESTUR, PREGSERUM, HCG, HCGQUANT     ABGs: No results found for: PHART, PO2ART, YGF3EDT, JDY8OBY, BEART, N8TEDFCS     Type & Screen (If Applicable):  No results found for: LABABO, LABRH    Drug/Infectious Status (If Applicable):  No results found for: HIV, HEPCAB    COVID-19 Screening (If Applicable): No results found for: COVID19        Anesthesia Evaluation  Patient summary reviewed and Nursing notes reviewed no history of anesthetic complications:   Airway: Mallampati: IV  TM distance: >3 FB   Neck ROM: limited  Mouth opening: < 3 FB Dental:      Comment: No chipped or loose teeth    Pulmonary:                              Cardiovascular:  Exercise tolerance: good (>4 METS),   (+) hypertension:,         Rhythm: regular  Rate: normal                    Neuro/Psych:                ROS comment: Diabetic neuropathy  GI/Hepatic/Renal:            ROS comment: obese. Endo/Other:    (+) DiabetesType II DM, poorly controlled, using insulin, . ROS comment: Facial abscess Abdominal:             Vascular: Other Findings:             Anesthesia Plan      general     ASA 3 - emergent     (Video laryngoscopy due to large abscess and limited mouth opening )  Induction: intravenous. MIPS: Prophylactic antiemetics administered. Anesthetic plan and risks discussed with patient. Plan discussed with CRNA.                   Abraham Brothers DO   11/9/2021

## 2021-11-09 NOTE — BRIEF OP NOTE
Brief Postoperative Note      Patient: Chapin Smith  YOB: 1980  MRN: [id number]    Date of Procedure: 11/9/2021    Pre-Op Diagnosis: LEFT FACIAL ABSCESS    Post-Op Diagnosis: Same       Procedure(s):  LEFT FACIAL ABSCESS INCISION AND DRAINAGE    Surgeon(s):  Nina Lawrence MD    Assistant:  Resident: Courtney Anders DO    Anesthesia: General    Estimated Blood Loss (mL): 5    Complications: None    Specimens:   ID Type Source Tests Collected by Time Destination   1 : Left Facial Abcess Specimen Neck CULTURE, FUNGUS, CULTURE, SURGICAL, CULTURE WITH SMEAR, ACID FAST Lore Wellington MD 11/9/2021 1518        Implants:  * No implants in log *      Drains:   Open Drain Inferior; Left Neck (Active)       Findings: Large submandibular abscess with copious amounts of thick, purulent drainage expressed. Penrose drain sutured in place.      Electronically signed by Courtney Anders DO on 11/9/2021 at 4:10 PM

## 2021-11-10 LAB
ANION GAP SERPL CALCULATED.3IONS-SCNC: 15 MMOL/L (ref 3–16)
BASOPHILS ABSOLUTE: 0.1 K/UL (ref 0–0.2)
BASOPHILS RELATIVE PERCENT: 0.6 %
BUN BLDV-MCNC: 22 MG/DL (ref 7–20)
CALCIUM SERPL-MCNC: 8.8 MG/DL (ref 8.3–10.6)
CHLORIDE BLD-SCNC: 100 MMOL/L (ref 99–110)
CO2: 23 MMOL/L (ref 21–32)
CREAT SERPL-MCNC: 0.8 MG/DL (ref 0.9–1.3)
EOSINOPHILS ABSOLUTE: 0.3 K/UL (ref 0–0.6)
EOSINOPHILS RELATIVE PERCENT: 3.2 %
GFR AFRICAN AMERICAN: >60
GFR NON-AFRICAN AMERICAN: >60
GLUCOSE BLD-MCNC: 164 MG/DL (ref 70–99)
GLUCOSE BLD-MCNC: 184 MG/DL (ref 70–99)
GLUCOSE BLD-MCNC: 226 MG/DL (ref 70–99)
GLUCOSE BLD-MCNC: 252 MG/DL (ref 70–99)
GLUCOSE BLD-MCNC: 285 MG/DL (ref 70–99)
HCT VFR BLD CALC: 31.3 % (ref 40.5–52.5)
HEMOGLOBIN: 10.4 G/DL (ref 13.5–17.5)
LYMPHOCYTES ABSOLUTE: 2.4 K/UL (ref 1–5.1)
LYMPHOCYTES RELATIVE PERCENT: 27.7 %
MAGNESIUM: 2.2 MG/DL (ref 1.8–2.4)
MCH RBC QN AUTO: 26.8 PG (ref 26–34)
MCHC RBC AUTO-ENTMCNC: 33.1 G/DL (ref 31–36)
MCV RBC AUTO: 81 FL (ref 80–100)
MONOCYTES ABSOLUTE: 0.6 K/UL (ref 0–1.3)
MONOCYTES RELATIVE PERCENT: 6.6 %
NEUTROPHILS ABSOLUTE: 5.4 K/UL (ref 1.7–7.7)
NEUTROPHILS RELATIVE PERCENT: 61.9 %
PDW BLD-RTO: 14.4 % (ref 12.4–15.4)
PERFORMED ON: ABNORMAL
PHOSPHORUS: 3.8 MG/DL (ref 2.5–4.9)
PLATELET # BLD: 413 K/UL (ref 135–450)
PMV BLD AUTO: 7 FL (ref 5–10.5)
POTASSIUM REFLEX MAGNESIUM: 4.2 MMOL/L (ref 3.5–5.1)
RBC # BLD: 3.86 M/UL (ref 4.2–5.9)
SODIUM BLD-SCNC: 138 MMOL/L (ref 136–145)
VANCOMYCIN RANDOM: 16.4 UG/ML
WBC # BLD: 8.7 K/UL (ref 4–11)

## 2021-11-10 PROCEDURE — 36415 COLL VENOUS BLD VENIPUNCTURE: CPT

## 2021-11-10 PROCEDURE — 2060000000 HC ICU INTERMEDIATE R&B

## 2021-11-10 PROCEDURE — 6360000002 HC RX W HCPCS: Performed by: STUDENT IN AN ORGANIZED HEALTH CARE EDUCATION/TRAINING PROGRAM

## 2021-11-10 PROCEDURE — 2580000003 HC RX 258: Performed by: STUDENT IN AN ORGANIZED HEALTH CARE EDUCATION/TRAINING PROGRAM

## 2021-11-10 PROCEDURE — 6370000000 HC RX 637 (ALT 250 FOR IP): Performed by: STUDENT IN AN ORGANIZED HEALTH CARE EDUCATION/TRAINING PROGRAM

## 2021-11-10 PROCEDURE — 84100 ASSAY OF PHOSPHORUS: CPT

## 2021-11-10 PROCEDURE — 80048 BASIC METABOLIC PNL TOTAL CA: CPT

## 2021-11-10 PROCEDURE — 80202 ASSAY OF VANCOMYCIN: CPT

## 2021-11-10 PROCEDURE — 85025 COMPLETE CBC W/AUTO DIFF WBC: CPT

## 2021-11-10 PROCEDURE — 83735 ASSAY OF MAGNESIUM: CPT

## 2021-11-10 RX ORDER — INSULIN LISPRO 100 [IU]/ML
13 INJECTION, SOLUTION INTRAVENOUS; SUBCUTANEOUS
Status: DISCONTINUED | OUTPATIENT
Start: 2021-11-10 | End: 2021-11-11

## 2021-11-10 RX ORDER — INSULIN LISPRO 100 [IU]/ML
0-9 INJECTION, SOLUTION INTRAVENOUS; SUBCUTANEOUS NIGHTLY
Status: DISCONTINUED | OUTPATIENT
Start: 2021-11-10 | End: 2021-11-11

## 2021-11-10 RX ORDER — INSULIN LISPRO 100 [IU]/ML
0-18 INJECTION, SOLUTION INTRAVENOUS; SUBCUTANEOUS
Status: DISCONTINUED | OUTPATIENT
Start: 2021-11-10 | End: 2021-11-11

## 2021-11-10 RX ADMIN — INSULIN LISPRO 6 UNITS: 100 INJECTION, SOLUTION INTRAVENOUS; SUBCUTANEOUS at 09:36

## 2021-11-10 RX ADMIN — INSULIN LISPRO 20 UNITS: 100 INJECTION, SOLUTION INTRAVENOUS; SUBCUTANEOUS at 12:47

## 2021-11-10 RX ADMIN — INSULIN LISPRO 13 UNITS: 100 INJECTION, SOLUTION INTRAVENOUS; SUBCUTANEOUS at 18:00

## 2021-11-10 RX ADMIN — VANCOMYCIN HYDROCHLORIDE 2000 MG: 10 INJECTION, POWDER, LYOPHILIZED, FOR SOLUTION INTRAVENOUS at 06:27

## 2021-11-10 RX ADMIN — INSULIN LISPRO 3 UNITS: 100 INJECTION, SOLUTION INTRAVENOUS; SUBCUTANEOUS at 12:43

## 2021-11-10 RX ADMIN — SODIUM CHLORIDE 25 ML: 9 INJECTION, SOLUTION INTRAVENOUS at 06:27

## 2021-11-10 RX ADMIN — GABAPENTIN 600 MG: 600 TABLET, FILM COATED ORAL at 09:48

## 2021-11-10 RX ADMIN — BUPRENORPHINE AND NALOXONE 4 TABLET: 2; .5 TABLET SUBLINGUAL at 09:47

## 2021-11-10 RX ADMIN — GABAPENTIN 600 MG: 600 TABLET, FILM COATED ORAL at 14:47

## 2021-11-10 RX ADMIN — STANDARDIZED SENNA CONCENTRATE 17.2 MG: 8.6 TABLET ORAL at 09:47

## 2021-11-10 RX ADMIN — INSULIN LISPRO 20 UNITS: 100 INJECTION, SOLUTION INTRAVENOUS; SUBCUTANEOUS at 06:31

## 2021-11-10 RX ADMIN — SODIUM CHLORIDE, PRESERVATIVE FREE 10 ML: 5 INJECTION INTRAVENOUS at 21:11

## 2021-11-10 RX ADMIN — SODIUM CHLORIDE 25 ML: 9 INJECTION, SOLUTION INTRAVENOUS at 20:57

## 2021-11-10 RX ADMIN — ENOXAPARIN SODIUM 40 MG: 100 INJECTION SUBCUTANEOUS at 09:48

## 2021-11-10 RX ADMIN — SODIUM CHLORIDE, PRESERVATIVE FREE 10 ML: 5 INJECTION INTRAVENOUS at 11:31

## 2021-11-10 RX ADMIN — LEVOFLOXACIN 500 MG: 5 INJECTION, SOLUTION INTRAVENOUS at 06:28

## 2021-11-10 RX ADMIN — ACETAMINOPHEN 650 MG: 325 TABLET ORAL at 12:54

## 2021-11-10 RX ADMIN — INSULIN LISPRO 3 UNITS: 100 INJECTION, SOLUTION INTRAVENOUS; SUBCUTANEOUS at 17:59

## 2021-11-10 RX ADMIN — VORTIOXETINE 20 MG: 10 TABLET, FILM COATED ORAL at 09:46

## 2021-11-10 RX ADMIN — VANCOMYCIN HYDROCHLORIDE 1500 MG: 10 INJECTION, POWDER, LYOPHILIZED, FOR SOLUTION INTRAVENOUS at 21:10

## 2021-11-10 RX ADMIN — CITALOPRAM HYDROBROMIDE 10 MG: 10 TABLET ORAL at 09:48

## 2021-11-10 RX ADMIN — INSULIN LISPRO 5 UNITS: 100 INJECTION, SOLUTION INTRAVENOUS; SUBCUTANEOUS at 21:05

## 2021-11-10 RX ADMIN — GABAPENTIN 600 MG: 600 TABLET, FILM COATED ORAL at 20:55

## 2021-11-10 RX ADMIN — ATORVASTATIN CALCIUM 40 MG: 40 TABLET, FILM COATED ORAL at 09:47

## 2021-11-10 ASSESSMENT — PAIN SCALES - GENERAL
PAINLEVEL_OUTOF10: 0
PAINLEVEL_OUTOF10: 2
PAINLEVEL_OUTOF10: 1
PAINLEVEL_OUTOF10: 0

## 2021-11-10 ASSESSMENT — PAIN DESCRIPTION - PROGRESSION
CLINICAL_PROGRESSION: NOT CHANGED

## 2021-11-10 ASSESSMENT — PAIN DESCRIPTION - LOCATION
LOCATION: FACE
LOCATION: FACE

## 2021-11-10 ASSESSMENT — PAIN DESCRIPTION - ORIENTATION
ORIENTATION: LEFT
ORIENTATION: LEFT

## 2021-11-10 ASSESSMENT — PAIN DESCRIPTION - ONSET
ONSET: ON-GOING
ONSET: ON-GOING

## 2021-11-10 ASSESSMENT — PAIN DESCRIPTION - FREQUENCY
FREQUENCY: INTERMITTENT
FREQUENCY: INTERMITTENT

## 2021-11-10 ASSESSMENT — PAIN DESCRIPTION - PAIN TYPE
TYPE: SURGICAL PAIN
TYPE: SURGICAL PAIN

## 2021-11-10 ASSESSMENT — PAIN DESCRIPTION - DESCRIPTORS
DESCRIPTORS: BURNING;DISCOMFORT
DESCRIPTORS: BURNING;DISCOMFORT

## 2021-11-10 NOTE — PLAN OF CARE
Problem: Serum Glucose Level - Abnormal:  Goal: Ability to maintain appropriate glucose levels has stabilized  Description: Ability to maintain appropriate glucose levels has stabilized  Outcome: Ongoing     Problem: Pain:  Goal: Pain level will decrease  Description: Pain level will decrease  Outcome: Ongoing     Problem: Skin Integrity:  Goal: Will show no infection signs and symptoms  Description: Will show no infection signs and symptoms  Outcome: Ongoing

## 2021-11-10 NOTE — PLAN OF CARE
Problem: Nutrition  Goal: Optimal nutrition therapy  Outcome: Ongoing     Nutrition Problem #1: Unintended weight loss  Intervention: Food and/or Nutrient Delivery: Continue Current Diet, Start Oral Nutrition Supplement  Nutritional Goals: Pt to consume >75% of meals and ONS this adm.

## 2021-11-10 NOTE — PROGRESS NOTES
Progress Note    Admit Date: 11/8/2021  Day: 1  Diet: ADULT DIET; Regular; 3 carb choices (45 gm/meal)    CC: Left Face Abscess    Interval history:  - Abscess has been incised and drained  - patient feeling ok overall  - endorses decreased PO intake for quite sometime, however also notes that he ate 2 dinners last night. ...  - On ROS: no SOB, CP, ab pain, N/V; also denied dysphagia, hoarseness, difficulty breathing    HPI:  Nevin Mack is a 35 y.o., male with PMH of poorly controlled diabetes, hypertension, depression presenting for large left facial abscess. Abscess began on 10/27; he went to the dentist and asked to have his tooth pulled. Due to this abscess dentist recommended 10 days of Augmentin. Patient finished course of Augmentin, return to dentist who conferred with oral maxillofacial surgery at [facility]. They recommended he continue with tooth extraction which occurred on 11/4. After extraction patient reports he had drainage from the wound in his mouth and then drainage from external wound beginning on Saturday. He says it is not painful but he does have a burning pain from wiping with paper towels and cleaning solution. He has had cellulitis/infections in the past.      Last A1c 10.4 on 9/24/2021.     Patient denies fevers, chills, pain. No stiff neck. \"    Medications:     Scheduled Meds:   insulin lispro  0-18 Units SubCUTAneous TID WC    insulin lispro  0-9 Units SubCUTAneous Nightly    insulin glargine  30 Units SubCUTAneous BID    levofloxacin  500 mg IntraVENous Q24H    vancomycin  2,000 mg IntraVENous Q12H    insulin lispro (1 Unit Dial)  20 Units SubCUTAneous TID AC    sodium chloride  1,000 mL IntraVENous Once    atorvastatin  40 mg Oral Daily    buprenorphine-naloxone  4 tablet SubLINGual Daily    citalopram  10 mg Oral Daily    gabapentin  600 mg Oral TID    [Held by provider] lisinopril  40 mg Oral Daily    senna  2 tablet Oral Daily    VORTIoxetine HBr  20 mg Oral Daily    sodium chloride flush  5-40 mL IntraVENous 2 times per day    enoxaparin  40 mg SubCUTAneous Daily     Continuous Infusions:   sodium chloride 25 mL (11/10/21 0627)    dextrose       PRN Meds:diphenhydrAMINE, sodium chloride flush, sodium chloride, ondansetron **OR** ondansetron, polyethylene glycol, acetaminophen **OR** acetaminophen, glucose, dextrose, glucagon (rDNA), dextrose    Objective:   Vitals:   T-max:  Patient Vitals for the past 8 hrs:   BP Temp Temp src Pulse Resp SpO2   11/10/21 0943 115/72 98.6 °F (37 °C) Oral 79 16 100 %   11/10/21 0422 102/66 98.2 °F (36.8 °C) Oral 71 18 98 %       Intake/Output Summary (Last 24 hours) at 11/10/2021 1031  Last data filed at 11/10/2021 0957  Gross per 24 hour   Intake 1720 ml   Output 2050 ml   Net -330 ml       Review of Systems  As per  Interval hx    Physical Exam  Constitutional:       General: He is not in acute distress. Appearance: He is obese. HENT:      Head: Normocephalic. Comments: L side facial abscess has been incised and drained - minor bloody drainage. Drain in place   Eyes:      Extraocular Movements: Extraocular movements intact. Conjunctiva/sclera: Conjunctivae normal.   Cardiovascular:      Rate and Rhythm: Normal rate and regular rhythm. Heart sounds: Normal heart sounds. Pulmonary:      Effort: Pulmonary effort is normal.      Breath sounds: Normal breath sounds. Abdominal:      Palpations: Abdomen is soft. Musculoskeletal:         General: Deformity present. Right lower leg: No edema. Left lower leg: No edema. Comments: R foot: amputation of all digits; clean based ulcer on sole     L foot: amputation of first and second digits; some ulcerative lesions on last 3 digits    Skin:     Comments: R foot: clean based ulcer on sole    Neurological:      General: No focal deficit present. Mental Status: He is alert and oriented to person, place, and time.       Sensory: Sensory deficit present. Comments: LLE - mild sensory deficit   Psychiatric:         Mood and Affect: Mood normal.         Behavior: Behavior normal.         Thought Content: Thought content normal.         Judgment: Judgment normal.         LABS:    CBC:   Recent Labs     11/08/21  1756 11/09/21  0521 11/10/21  0526   WBC 11.8* 10.1 8.7   HGB 11.6* 10.6* 10.4*   HCT 36.0* 32.4* 31.3*   * 407 413   MCV 81.8 80.5 81.0     Renal:    Recent Labs     11/08/21  2159 11/09/21  0521 11/10/21  0526   * 133* 138   K 4.6 4.1 4.2   CL 96* 99 100   CO2 23 21 23   BUN 36* 26* 22*   CREATININE 0.9 0.7* 0.8*   GLUCOSE 494* 296* 252*   CALCIUM 8.9 8.7 8.8   ANIONGAP 12 13 15     Hepatic:   Recent Labs     11/08/21 1756   AST 8*   ALT 10   BILITOT 0.3   PROT 7.9   LABALBU 3.8   ALKPHOS 96     Troponin: No results for input(s): TROPONINI in the last 72 hours. BNP: No results for input(s): BNP in the last 72 hours. Lipids: No results for input(s): CHOL, HDL in the last 72 hours. Invalid input(s): LDLCALCU, TRIGLYCERIDE  ABGs:  No results for input(s): PHART, LJL3SXC, PO2ART, JJU2NFQ, BEART, THGBART, B3MYQZIV, OYT2ZHL in the last 72 hours. INR:   Recent Labs     11/09/21  1046   INR 1.09     Lactate: No results for input(s): LACTATE in the last 72 hours. Cultures:  -----------------------------------------------------------------  RAD:   CT SOFT TISSUE NECK W CONTRAST   Final Result      Large ill-defined fluid collection centered along the inferior aspect of the left mandible measuring approximately 8.9 x 3.9 x 4.3 cm. No evidence of airway compromise. XR CHEST (2 VW)   Final Result      No evidence of acute cardiopulmonary disease.                 Assessment/Plan:     #sepsis 2/2 L facial abscess  - SIRS + on admission: tachycardic and WBC 11.8 => RESOLVED  - ENT consult: I&D on 11.9.21  - work on BG control: Lantus dose increased to 30 units BID with Lispro HDSSI  - abx: vanc, levoquin, flagyl  - Cx Anerobic n Aerobic:   No Epithelial Cells seen   2+ WBC's (Polymorphonuclear)   1+ Gram positive cocci   1+ Gram negative rods  - other Cx sill in process      #DM2-last A1c 10.4 on 9/24   at [facility], down to 494 with 10 Regular Insulin. BG goal is < 180   - currently 226   - Lantus 30 units BID  - HDSSI 3x with meals   - nightly correction Lispro also in place   - Repeat A1c: 11.6 (11.9.21)      #Major depressive disorder  Patient on 10 of Celexa and recently started on Trintellix. Reports taking both at home.  -Continue home meds    #HTN  - BP soft since yesterday  - holding lisinopril     ENT Updates: ENT to continue to follow.  Will determine if return to OR is required    Code Status: Full Code  FEN: NPO  PPX: Lovenox  DISPO: Collette Mcintosh MD, PGY-1  11/10/21  10:31 AM    This patient has been staffed and discussed with Stanislav Moran MD.

## 2021-11-10 NOTE — PROGRESS NOTES
Surgery Daily Progress Note  Migue Goldman  CC:  Left facial abscess      Subjective :No acute events overnight. Patient remains afebrile and HDS although his glucose remains high. Dressing required multiple changes throughout evening due to staining. Pain well controlled. Tolerating diet. Appropriate UOP. Objective    Infusions:   sodium chloride 25 mL (11/10/21 0627)    dextrose          I/O:I/O last 3 completed shifts: In: 2015 [P.O.:1010; I.V.:1005]  Out: 2775 [Urine:2775]           Wt Readings from Last 1 Encounters:   11/08/21 299 lb (135.6 kg)                 LABS:    Recent Labs     11/09/21  0521 11/10/21  0526   WBC 10.1 8.7   HGB 10.6* 10.4*   HCT 32.4* 31.3*   MCV 80.5 81.0    413        Recent Labs     11/09/21  0521 11/10/21  0526   * 138   K 4.1 4.2   CL 99 100   CO2 21 23   BUN 26* 22*   CREATININE 0.7* 0.8*        Recent Labs     11/08/21  1756   AST 8*   ALT 10   BILITOT 0.3   ALKPHOS 96      No results for input(s): LIPASE, AMYLASE in the last 72 hours. Recent Labs     11/08/21  1756 11/09/21  1046   PROT 7.9  --    INR  --  1.09      No results for input(s): CKTOTAL, CKMB, CKMBINDEX, TROPONINI in the last 72 hours. Exam:/66   Pulse 71   Temp 98.2 °F (36.8 °C) (Oral)   Resp 18   Ht 6' 2.5\" (1.892 m)   Wt 299 lb (135.6 kg)   SpO2 98%   BMI 37.88 kg/m²   General appearance: alert, appears stated age and cooperative  HEENT: Incision with minimal swelling, penrose in place with some seropurulent drainage. Chest/Lungs: normal effort, symmetric chest rise, on RA  Cardiovascular: RRR  Abdomen: soft, non-tender, non-distended  Extremities: no edema, no cyanosis  Neuro: A&Ox3, no focal deficits, sensation intact    ASSESSMENT/PLAN: Pt. is a 35 y.o. male s/p left facial abscess incision and drainage     - continue scheduled suboxone, gabapentin and PRN tylenol for pain control  - continue diabetic diet  - patient requires strict blood glucose management. Changed to high dose sliding scale  - continue to change outer dressing as needed  - ENT to continue to follow.  Will determine if return to OR is required  - continue medical management per primary team        [facility], CAROLINE - CNP 11/10/2021 6:43 AM  [phone]

## 2021-11-10 NOTE — PROGRESS NOTES
Comprehensive Nutrition Assessment    RECOMMENDATIONS:  1. PO Diet: Continue current diet  2. ONS: Start Glucerna QD      NUTRITION ASSESSMENT:   Type and Reason for Visit:  Type and Reason for Visit: Positive Nutrition Screen   Nutritional summary & status: +IP for poor intake and appetite with wt loss. Pt intakes this adm >75%. EMR shows wt loss of 4% over 1 month, which is not significant. Pt usually drinks Boost at home, but has not been able to buy them over the last few weeks. Pt to benefit from ONS to promote further intake. Will order. Patient admitted d/t face/neck abscess    PMH significant for: uncontrolled DM, HTN    MALNUTRITION ASSESSMENT  Context of Malnutrition: Acute Illness   Malnutrition Status: No malnutrition      NUTRITION DIAGNOSIS   · Unintended weight loss related to inadequate protein-energy intake as evidenced by weight loss (4% weight loss in 1 month)      NUTRITION INTERVENTION  Food and/or Nutrient Delivery:  Continue Current Diet, Start Oral Nutrition Supplement  Nutrition Education/Counseling:  No recommendation at this time   Goals:  Pt to consume >75% of meals and ONS this adm. Nutrition Monitoring and Evaluation:   Food/Nutrient Intake Outcomes:  Food and Nutrient Intake, Supplement Intake  Physical Signs/Symptoms Outcomes:  Biochemical Data, Weight     OBJECTIVE DATA: Significant to nutrition assessment  · Nutrition-Focused Physical Findings: Nutrition Related Findings: last BM 11/8   · Labs: Reviewed;   · Meds: Reviewed;   · Wounds: Wound Type: Surgical Incision     CURRENT NUTRITION THERAPIES  ADULT DIET;  Regular; 3 carb choices (45 gm/meal)       PO Intake: Average Meal Intake: %   PO Supplement Intake:Average Supplements Intake: None Ordered  IVF: n/a     ANTHROPOMETRICS  Current Height: 6' 2\" (188 cm)  Current Weight: 299 lb (135.6 kg)    Admission weight: 299 lb (135.6 kg)  Ideal Body Weight (lbs) (Calculated): 190 lbs (Ideal Body Weight (Kg) (Calculated): 86 kg)  Usual Bodyweight CAROLINE   Weight Changes 4% weight loss over 1 month via Care Everywhere      BMI BMI (Calculated): 38    Wt Readings from Last 50 Encounters:   11/08/21 299 lb (135.6 kg)   09/24/19 (!) 345 lb (156.5 kg)   02/18/18 (!) 325 lb (147.4 kg)   08/14/17 (!) 323 lb (146.5 kg)   12/23/15 (!) 335 lb (152 kg)       COMPARATIVE STANDARDS  Energy (kcal):  2000-2400kcal/d; Weight Used for Energy Requirements:  Current     Protein (g):  103-129g/d; Weight Used for Protein Requirements:  Ideal        Fluid (ml/day):  2000-2400ml/d; Method Used for Fluid Requirements:  1 ml/kcal      The patient will still be monitored per nutrition standards of care. Consult dietitian if nutrition interventions essential to patient care is needed.      Nai Franco Xander [address], [address]  [place]:  [phone]  Office:  [phone]

## 2021-11-10 NOTE — PLAN OF CARE
Problem: Serum Glucose Level - Abnormal:  Goal: Ability to maintain appropriate glucose levels has stabilized  Description: Ability to maintain appropriate glucose levels has stabilized  11/10/2021 1624 by Iqra Barba RN  Outcome: Ongoing  Note: Blood sugar has been checked and insulin has been given per order. Will continue to assess     Problem: Pain:  Goal: Pain level will decrease  Description: Pain level will decrease  11/10/2021 1624 by Iqra Barba RN  Outcome: Ongoing  Note: Pt taught the importance of verbalizing pain so that appropriate interventions can be used. Problem: Skin Integrity:  Goal: Will show no infection signs and symptoms  Description: Will show no infection signs and symptoms  11/10/2021 1624 by Iqra Barba RN  Outcome: Ongoing  Note: Pt's vitals have shown no signs of infection thus far for the duration of the shift.  Will continue to assess      Problem: Skin Integrity:  Goal: Absence of new skin breakdown  Description: Absence of new skin breakdown  Outcome: Ongoing  Note: No new skin breakdown noted thus far for the duration of the shift

## 2021-11-10 NOTE — PROGRESS NOTES
Clinical Pharmacy Progress Note    Vancomycin - Management by Pharmacy    Consult Date(s): 11/9/21  Consulting Provider(s): Edyta Mcdaniel    Assessment / Plan    Sepsis 2/2 facial ascess - Vancomycin  Concurrent Antimicrobials: Flagyl  Day of Vanc Therapy: 2  Current Dosing Method: Bayesian-Guided AUC Dosing  Therapeutic Goal: 400-600 mg/L*hr  Current Dose / Frequency: Vancomycin 2000 mg every 12 hours   Random level this AM = 16.4 mcg/mL. Estimates AUC = 629 mg/L*hr (supratherapeutic). Plan / Rationale: Will decrease dose to 1500 mg IV q12h. Regimen estimates AUC of 472 mg/L*hr with trough of 12.8 mg/L  Will continue to monitor clinical condition and make adjustments to regimen as appropriate. Thank you for consulting Pharmacy! Magdi Cunningham, PharmD Candidate 2022   11/10/2021  9:43 AM          Interval update: Patient is recovering from surgery on 11/10/21     Subjective/Objective: Mr. Erick Santillan is a 35 y.o. male with a PMHx significant for DMII, HTN, depression, admitted for left facial abscess. Abscess began 10/27. Dentist prescribed Augmentin 10 days. Upon completion of ABX patient underwent tooth extraction on 11/4. Patient reports drainage from external wound beginning on 11/6. Pharmacy has been consulted to dose vancomycin per Dr Edyta Mcdaniel. Height:   Ht Readings from Last 1 Encounters:   11/08/21 6' 2.5\" (1.892 m)     Weight:   Wt Readings from Last 1 Encounters:   11/08/21 299 lb (135.6 kg)       Current & Prior Antimicrobial Regimen(s):  Flagyl 500 mg IV every 8 hours  Vancomycin 2000 mg IV every 12 hours    Level(s) / Doses:    Date Time Dose Level / Type of Level Interpretation   11/09/21 05:21 1500 mg q12h  Random level = 11.9 mcg/mL AUC was subtherapeutic (382). Dose was increased to 2000 mg q12h for a target AUC of 400-600.    11/10/21 05:26 2000 mg q12h Random level = 16.4 mcg/mL AUC supratherapeutic (629).   Dose was decreased to 1500mg q12h for a target UAC of 400-600   Note: Serum levels collected for AUC-based dosing may be high if collected in close proximity to the dose administered. This is not necessarily an indicator of toxicity. Cultures & Sensitivities:    Date Site Micro Susceptibility / Result                 Labs / Ancillary Data:    Estimated Creatinine Clearance: 176 mL/min (A) (based on SCr of 0.8 mg/dL (L)). Recent Labs     11/08/21  1756 11/08/21  1756 11/08/21  2159 11/09/21  0521 11/10/21  0526   CREATININE 1.0   < > 0.9 0.7* 0.8*   BUN 37*   < > 36* 26* 22*   WBC 11.8*  --   --  10.1 8.7    < > = values in this interval not displayed.

## 2021-11-10 NOTE — PROGRESS NOTES
Physician Progress Note      PATIENTConcepcileonardo Geiger  [facility] #:                  [id number]  :                       1980  ADMIT DATE:       2021 5:21 PM  DISCH DATE:  RESPONDING  PROVIDER #:        Sparkle Johnson MD          QUERY TEXT:    Patient admitted with facial abscess. Noted documentation of sepsis in H&P. In   order to support the diagnosis of sepsis, please include additional clinical   indicators in your documentation. Or please document if the diagnosis of   sepsis has been ruled out after further study    The medical record reflects the following:  Risk Factors: 36 yo w/ facial abscess, infected tooth  Clinical Indicators: Per H&P: sepsis 2/2 L facial abscess. WBC 11.8 - 8.7. Temp 97.3 - 99.3. Treatment: IVF bolus X1. IV Vanc. No blood cultures, no lactic acid or   procalcitonin. Options provided:  -- Sepsis was ruled out after study  -- Sepsis present as evidenced by, Please document evidence. -- Other - I will add my own diagnosis  -- Disagree - Not applicable / Not valid  -- Disagree - Clinically unable to determine / Unknown  -- Refer to Clinical Documentation Reviewer    PROVIDER RESPONSE TEXT:    Sepsis was ruled out after study.     Query created by: Halie Myles on 11/10/2021 12:05 PM      Electronically signed by:  Sparkle Johnson MD 11/10/2021 12:13 PM

## 2021-11-10 NOTE — PROGRESS NOTES
Department of Surgery:  Post-op Note    Procedure(s) Performed: Left facial abscess incision and drainage    Subjective:   Patient is doing well post operatively. His pain is well controlled, he states at times he feels pressure but he is now able to open and close his jaw. Denies an nausea or vomiting. Tolerating diet. Objective:  Anesthesia type: General      I/O    Intra op    Post op     Fluids  1000 mL      EBL Minimal  0 mL     Urine Not recorded  400 mL     Exam:  Vitals:    11/09/21 1715 11/09/21 1730 11/09/21 1820 11/09/21 1929   BP: (!) 102/52 (!) 98/55 115/71 106/67   Pulse: 78 78 91 89   Resp: 12 16 18 18   Temp:  97.3 °F (36.3 °C) 97.6 °F (36.4 °C) 99.3 °F (37.4 °C)   TempSrc:  Temporal Oral Oral   SpO2: 100% 99% 96% 96%   Weight:       Height:           General appearance: alert, no acute distress  HEENT: Incision with minimal swelling, penrose in place with some serosanguinous drainage  Chest/Lungs: normal effort, symmetric chest rise, on RA  Cardiovascular: RRR  Abdomen: soft, non-tender, non-distended  Extremities: no edema, no cyanosis  Neuro: A&Ox3, no focal deficits, sensation intact    Assessment and Plan  This is a 35y.o. year old male s/p left facial abscess incision and drainage POD #0    Pain management: Scheduled suboxone, gabapentin.  PRN tylenol  Cardiovascular: Hemodynamically stable, will continue to monitor closely   Respiratory: extubated, encourage hourly incentive spirometry and deep breathing  FEN:  Diet: Carb control  : Urine output is adequate  Wound: Continue to change outer dressing as needed  Ambulation: OOB to chair, encourage ambulation  Prophylaxis: zachary Chakraborty DO  11/09/21  10:10 PM  [phone]

## 2021-11-10 NOTE — PROGRESS NOTES
Physician Progress Note      Ash Walker  CSN #:                  [id number]  :                       1980  ADMIT DATE:       2021 5:21 PM  DISCH DATE:  RESPONDING  PROVIDER #:        Marilin Guerrier DO          QUERY TEXT:    Patient admitted with left facial abscess. Per Op note dated    documentation of I&D. To accurately reflect the procedure performed please further specify the depth   of tissue incised and drained: The medical record reflects the following:  Risk Factors: 34 yo with infected tooth  Clinical Indicators: Per op note : a 15 blade overlying the most fluctuant   area of the abscess below the mandible. Immediately, more than 20 cc of thick   purulent drainage was expressed. A swab was used to sample the fluid and this   was sent to lab for culture. Using a hemostat, the abscess cavity was opened   in the medial direction, and additional copious amounts of evelyne-purulent   drainage was expressed. Laterally, a large conglomerate of purulent tissue was   encountered and was removed from the cavity with a hemostat. A finger was   used to bluntly dissect in all directio  Treatment: as above  Options provided:  -- Skin only  -- Subcutaneous tissue  -- Fascia  -- Other - I will add my own diagnosis  -- Disagree - Not applicable / Not valid  -- Disagree - Clinically unable to determine / Unknown  -- Refer to Clinical Documentation Reviewer    PROVIDER RESPONSE TEXT:    Addendum to  procedure note The depth of the drainage to left facial   abscess was down to and including subcutaneous tissue.     Query created by: Meseret Irvin on 11/10/2021 12:10 PM      Electronically signed by:  Marilin Guerrier DO 11/10/2021 12:38 PM

## 2021-11-11 LAB
ALBUMIN SERPL-MCNC: 3.3 G/DL (ref 3.4–5)
ANAEROBIC CULTURE: ABNORMAL
ANION GAP SERPL CALCULATED.3IONS-SCNC: 11 MMOL/L (ref 3–16)
BASOPHILS ABSOLUTE: 0 K/UL (ref 0–0.2)
BASOPHILS RELATIVE PERCENT: 0.7 %
BUN BLDV-MCNC: 15 MG/DL (ref 7–20)
CALCIUM SERPL-MCNC: 8.6 MG/DL (ref 8.3–10.6)
CHLORIDE BLD-SCNC: 104 MMOL/L (ref 99–110)
CO2: 23 MMOL/L (ref 21–32)
CREAT SERPL-MCNC: 0.6 MG/DL (ref 0.9–1.3)
EOSINOPHILS ABSOLUTE: 0.3 K/UL (ref 0–0.6)
EOSINOPHILS RELATIVE PERCENT: 4.3 %
GFR AFRICAN AMERICAN: >60
GFR NON-AFRICAN AMERICAN: >60
GLUCOSE BLD-MCNC: 168 MG/DL (ref 70–99)
GLUCOSE BLD-MCNC: 187 MG/DL (ref 70–99)
GLUCOSE BLD-MCNC: 195 MG/DL (ref 70–99)
GLUCOSE BLD-MCNC: 206 MG/DL (ref 70–99)
GLUCOSE BLD-MCNC: 231 MG/DL (ref 70–99)
GLUCOSE BLD-MCNC: 243 MG/DL (ref 70–99)
GLUCOSE BLD-MCNC: 244 MG/DL (ref 70–99)
GLUCOSE BLD-MCNC: 256 MG/DL (ref 70–99)
GRAM STAIN RESULT: ABNORMAL
HCT VFR BLD CALC: 31 % (ref 40.5–52.5)
HEMOGLOBIN: 10.2 G/DL (ref 13.5–17.5)
LYMPHOCYTES ABSOLUTE: 2.1 K/UL (ref 1–5.1)
LYMPHOCYTES RELATIVE PERCENT: 30.8 %
MAGNESIUM: 2.1 MG/DL (ref 1.8–2.4)
MCH RBC QN AUTO: 26.7 PG (ref 26–34)
MCHC RBC AUTO-ENTMCNC: 33.1 G/DL (ref 31–36)
MCV RBC AUTO: 80.9 FL (ref 80–100)
MONOCYTES ABSOLUTE: 0.4 K/UL (ref 0–1.3)
MONOCYTES RELATIVE PERCENT: 6.4 %
NEUTROPHILS ABSOLUTE: 3.8 K/UL (ref 1.7–7.7)
NEUTROPHILS RELATIVE PERCENT: 57.8 %
PDW BLD-RTO: 14.3 % (ref 12.4–15.4)
PERFORMED ON: ABNORMAL
PHOSPHORUS: 3.6 MG/DL (ref 2.5–4.9)
PLATELET # BLD: 419 K/UL (ref 135–450)
PMV BLD AUTO: 7.1 FL (ref 5–10.5)
POTASSIUM SERPL-SCNC: 4.2 MMOL/L (ref 3.5–5.1)
RBC # BLD: 3.83 M/UL (ref 4.2–5.9)
SODIUM BLD-SCNC: 138 MMOL/L (ref 136–145)
WBC # BLD: 6.7 K/UL (ref 4–11)
WOUND/ABSCESS: ABNORMAL

## 2021-11-11 PROCEDURE — 6370000000 HC RX 637 (ALT 250 FOR IP): Performed by: STUDENT IN AN ORGANIZED HEALTH CARE EDUCATION/TRAINING PROGRAM

## 2021-11-11 PROCEDURE — 99255 IP/OBS CONSLTJ NEW/EST HI 80: CPT | Performed by: INTERNAL MEDICINE

## 2021-11-11 PROCEDURE — 36415 COLL VENOUS BLD VENIPUNCTURE: CPT

## 2021-11-11 PROCEDURE — 02HV33Z INSERTION OF INFUSION DEVICE INTO SUPERIOR VENA CAVA, PERCUTANEOUS APPROACH: ICD-10-PCS | Performed by: STUDENT IN AN ORGANIZED HEALTH CARE EDUCATION/TRAINING PROGRAM

## 2021-11-11 PROCEDURE — 80069 RENAL FUNCTION PANEL: CPT

## 2021-11-11 PROCEDURE — 6360000002 HC RX W HCPCS: Performed by: STUDENT IN AN ORGANIZED HEALTH CARE EDUCATION/TRAINING PROGRAM

## 2021-11-11 PROCEDURE — 2580000003 HC RX 258: Performed by: STUDENT IN AN ORGANIZED HEALTH CARE EDUCATION/TRAINING PROGRAM

## 2021-11-11 PROCEDURE — 1200000000 HC SEMI PRIVATE

## 2021-11-11 PROCEDURE — C1751 CATH, INF, PER/CENT/MIDLINE: HCPCS

## 2021-11-11 PROCEDURE — 2500000003 HC RX 250 WO HCPCS: Performed by: INTERNAL MEDICINE

## 2021-11-11 PROCEDURE — 6360000002 HC RX W HCPCS: Performed by: INTERNAL MEDICINE

## 2021-11-11 PROCEDURE — 85025 COMPLETE CBC W/AUTO DIFF WBC: CPT

## 2021-11-11 PROCEDURE — 83735 ASSAY OF MAGNESIUM: CPT

## 2021-11-11 PROCEDURE — 2580000003 HC RX 258: Performed by: INTERNAL MEDICINE

## 2021-11-11 PROCEDURE — 36569 INSJ PICC 5 YR+ W/O IMAGING: CPT

## 2021-11-11 PROCEDURE — 6370000000 HC RX 637 (ALT 250 FOR IP): Performed by: INTERNAL MEDICINE

## 2021-11-11 RX ORDER — INSULIN LISPRO 100 [IU]/ML
0-6 INJECTION, SOLUTION INTRAVENOUS; SUBCUTANEOUS NIGHTLY
Status: DISCONTINUED | OUTPATIENT
Start: 2021-11-11 | End: 2021-11-13 | Stop reason: HOSPADM

## 2021-11-11 RX ORDER — SODIUM CHLORIDE 9 MG/ML
25 INJECTION, SOLUTION INTRAVENOUS PRN
Status: DISCONTINUED | OUTPATIENT
Start: 2021-11-11 | End: 2021-11-13 | Stop reason: HOSPADM

## 2021-11-11 RX ORDER — SODIUM CHLORIDE 0.9 % (FLUSH) 0.9 %
5-40 SYRINGE (ML) INJECTION EVERY 12 HOURS SCHEDULED
Status: DISCONTINUED | OUTPATIENT
Start: 2021-11-11 | End: 2021-11-13 | Stop reason: HOSPADM

## 2021-11-11 RX ORDER — SODIUM CHLORIDE 0.9 % (FLUSH) 0.9 %
5-40 SYRINGE (ML) INJECTION PRN
Status: DISCONTINUED | OUTPATIENT
Start: 2021-11-11 | End: 2021-11-13 | Stop reason: HOSPADM

## 2021-11-11 RX ORDER — LIDOCAINE HYDROCHLORIDE 10 MG/ML
5 INJECTION, SOLUTION EPIDURAL; INFILTRATION; INTRACAUDAL; PERINEURAL ONCE
Status: COMPLETED | OUTPATIENT
Start: 2021-11-11 | End: 2021-11-11

## 2021-11-11 RX ORDER — SACCHAROMYCES BOULARDII 250 MG
250 CAPSULE ORAL 2 TIMES DAILY
Status: DISCONTINUED | OUTPATIENT
Start: 2021-11-11 | End: 2021-11-13 | Stop reason: HOSPADM

## 2021-11-11 RX ORDER — INSULIN LISPRO 100 [IU]/ML
15 INJECTION, SOLUTION INTRAVENOUS; SUBCUTANEOUS
Status: DISCONTINUED | OUTPATIENT
Start: 2021-11-11 | End: 2021-11-13 | Stop reason: HOSPADM

## 2021-11-11 RX ORDER — INSULIN LISPRO 100 [IU]/ML
0-12 INJECTION, SOLUTION INTRAVENOUS; SUBCUTANEOUS
Status: DISCONTINUED | OUTPATIENT
Start: 2021-11-11 | End: 2021-11-13 | Stop reason: HOSPADM

## 2021-11-11 RX ADMIN — Medication 250 MG: at 21:42

## 2021-11-11 RX ADMIN — INSULIN LISPRO 2 UNITS: 100 INJECTION, SOLUTION INTRAVENOUS; SUBCUTANEOUS at 17:30

## 2021-11-11 RX ADMIN — Medication 250 MG: at 11:34

## 2021-11-11 RX ADMIN — BUPRENORPHINE AND NALOXONE 4 TABLET: 2; .5 TABLET SUBLINGUAL at 09:48

## 2021-11-11 RX ADMIN — AMPICILLIN SODIUM AND SULBACTAM SODIUM 3000 MG: 2; 1 INJECTION, POWDER, FOR SOLUTION INTRAMUSCULAR; INTRAVENOUS at 14:26

## 2021-11-11 RX ADMIN — INSULIN LISPRO 3 UNITS: 100 INJECTION, SOLUTION INTRAVENOUS; SUBCUTANEOUS at 09:40

## 2021-11-11 RX ADMIN — INSULIN LISPRO 15 UNITS: 100 INJECTION, SOLUTION INTRAVENOUS; SUBCUTANEOUS at 17:30

## 2021-11-11 RX ADMIN — LEVOFLOXACIN 500 MG: 5 INJECTION, SOLUTION INTRAVENOUS at 07:39

## 2021-11-11 RX ADMIN — CITALOPRAM HYDROBROMIDE 10 MG: 10 TABLET ORAL at 09:48

## 2021-11-11 RX ADMIN — STANDARDIZED SENNA CONCENTRATE 17.2 MG: 8.6 TABLET ORAL at 09:48

## 2021-11-11 RX ADMIN — INSULIN LISPRO 4 UNITS: 100 INJECTION, SOLUTION INTRAVENOUS; SUBCUTANEOUS at 12:35

## 2021-11-11 RX ADMIN — GABAPENTIN 600 MG: 600 TABLET, FILM COATED ORAL at 09:48

## 2021-11-11 RX ADMIN — GABAPENTIN 600 MG: 600 TABLET, FILM COATED ORAL at 14:40

## 2021-11-11 RX ADMIN — ATORVASTATIN CALCIUM 40 MG: 40 TABLET, FILM COATED ORAL at 09:48

## 2021-11-11 RX ADMIN — VANCOMYCIN HYDROCHLORIDE 1500 MG: 10 INJECTION, POWDER, LYOPHILIZED, FOR SOLUTION INTRAVENOUS at 11:05

## 2021-11-11 RX ADMIN — DIPHENHYDRAMINE HYDROCHLORIDE 25 MG: 50 INJECTION, SOLUTION INTRAMUSCULAR; INTRAVENOUS at 12:28

## 2021-11-11 RX ADMIN — INSULIN LISPRO 15 UNITS: 100 INJECTION, SOLUTION INTRAVENOUS; SUBCUTANEOUS at 12:35

## 2021-11-11 RX ADMIN — GABAPENTIN 600 MG: 600 TABLET, FILM COATED ORAL at 21:16

## 2021-11-11 RX ADMIN — LIDOCAINE HYDROCHLORIDE 5 ML: 10 INJECTION, SOLUTION EPIDURAL; INFILTRATION; INTRACAUDAL; PERINEURAL at 15:24

## 2021-11-11 RX ADMIN — ENOXAPARIN SODIUM 40 MG: 100 INJECTION SUBCUTANEOUS at 09:48

## 2021-11-11 RX ADMIN — AMPICILLIN SODIUM AND SULBACTAM SODIUM 3000 MG: 2; 1 INJECTION, POWDER, FOR SOLUTION INTRAMUSCULAR; INTRAVENOUS at 21:16

## 2021-11-11 RX ADMIN — SODIUM CHLORIDE, PRESERVATIVE FREE 10 ML: 5 INJECTION INTRAVENOUS at 21:41

## 2021-11-11 RX ADMIN — VORTIOXETINE 20 MG: 10 TABLET, FILM COATED ORAL at 09:48

## 2021-11-11 RX ADMIN — SODIUM CHLORIDE, PRESERVATIVE FREE 10 ML: 5 INJECTION INTRAVENOUS at 09:48

## 2021-11-11 RX ADMIN — INSULIN LISPRO 13 UNITS: 100 INJECTION, SOLUTION INTRAVENOUS; SUBCUTANEOUS at 06:51

## 2021-11-11 RX ADMIN — SODIUM CHLORIDE, PRESERVATIVE FREE 10 ML: 5 INJECTION INTRAVENOUS at 19:41

## 2021-11-11 RX ADMIN — INSULIN LISPRO 1 UNITS: 100 INJECTION, SOLUTION INTRAVENOUS; SUBCUTANEOUS at 21:34

## 2021-11-11 RX ADMIN — DIPHENHYDRAMINE HYDROCHLORIDE 25 MG: 50 INJECTION, SOLUTION INTRAMUSCULAR; INTRAVENOUS at 19:41

## 2021-11-11 ASSESSMENT — PAIN SCALES - GENERAL
PAINLEVEL_OUTOF10: 0

## 2021-11-11 NOTE — PROGRESS NOTES
Hospitalist Addendum           Addendum to Resident Note:   Patient seen and evaluated   I agree with findings, Assessment and plan documented as above except for below  CC on presentation: Abscess (left jaw)          A/p    Active Hospital Problems    Diagnosis Date Noted    Neck abscess [L02.11]     Facial abscess [L02.01] 11/08/2021        Plan  Facial abscess  S/p I & D  Culture growing Gram positive and negative  Continue vanc, levaquin. ID consult for further abx recs     DM type 2   Uncontrolled  Goal Blood suga<180  Continue Lantus, and Humalog.         Tasha Fabian MD  10:04 AM

## 2021-11-11 NOTE — PLAN OF CARE
Problem: Serum Glucose Level - Abnormal:  Goal: Ability to maintain appropriate glucose levels has stabilized  Description: Ability to maintain appropriate glucose levels has stabilized  11/11/2021 0053 by Elizabeth Darling RN  Outcome: Ongoing     Problem: Pain:  Goal: Pain level will decrease  Description: Pain level will decrease  11/11/2021 0053 by Elizabeth Darling RN  Outcome: Ongoing     Problem: Skin Integrity:  Goal: Will show no infection signs and symptoms  Description: Will show no infection signs and symptoms  11/11/2021 0053 by Elizabeth Darling RN  Outcome: Ongoing

## 2021-11-11 NOTE — PROGRESS NOTES
Pt transferred to room [phone] by this RN and charge RN. Pt was transferred by bed with all belongings. Report given to RN on [address].

## 2021-11-11 NOTE — CONSULTS
Clinical Pharmacy Progress Note    Vancomycin has been discontinued - Pharmacy will sign off on dosing  If vancomycin is resumed, please re-consult Pharmacy     Please call with questions:  [phone] ([facility])    Carin Arita. Jenna VIERA    11/11/2021 12:54 PM

## 2021-11-11 NOTE — PROGRESS NOTES
Progress Note    Admit Date: 11/8/2021  Day: 1  Diet: ADULT DIET; Regular; 3 carb choices (45 gm/meal)  ADULT ORAL NUTRITION SUPPLEMENT; Lunch; Diabetic Oral Supplement    CC: Left Face Abscess    Interval history:  - Abscess has been incised and drained 11.9.21 - today looks much better and decreased in size  - patient feeling well - no complaints  - On ROS: no SOB, CP, ab pain, N/V; also denied dysphagia, hoarseness, difficulty breathing    HPI:  Марина Jack is a 35 y.o., male with PMH of poorly controlled diabetes, hypertension, depression presenting for large left facial abscess. Abscess began on 10/27; he went to the dentist and asked to have his tooth pulled. Due to this abscess dentist recommended 10 days of Augmentin. Patient finished course of Augmentin, return to dentist who conferred with oral maxillofacial surgery at [facility]. They recommended he continue with tooth extraction which occurred on 11/4. After extraction patient reports he had drainage from the wound in his mouth and then drainage from external wound beginning on Saturday. He says it is not painful but he does have a burning pain from wiping with paper towels and cleaning solution. He has had cellulitis/infections in the past.      Last A1c 10.4 on 9/24/2021.     Patient denies fevers, chills, pain. No stiff neck. \"    Medications:     Scheduled Meds:   insulin lispro (1 Unit Dial)  15 Units SubCUTAneous TID AC    insulin glargine  33 Units SubCUTAneous BID    insulin lispro  0-12 Units SubCUTAneous TID WC    insulin lispro  0-6 Units SubCUTAneous Nightly    saccharomyces boulardii  250 mg Oral BID    vancomycin  1,500 mg IntraVENous Q12H    levofloxacin  500 mg IntraVENous Q24H    sodium chloride  1,000 mL IntraVENous Once    atorvastatin  40 mg Oral Daily    buprenorphine-naloxone  4 tablet SubLINGual Daily    citalopram  10 mg Oral Daily    gabapentin  600 mg Oral TID    [Held by provider] lisinopril  40 mg Oral deficit present. Mental Status: He is alert and oriented to person, place, and time. Sensory: Sensory deficit present. Comments: LLE - mild sensory deficit   Psychiatric:         Mood and Affect: Mood normal.         Behavior: Behavior normal.         Thought Content: Thought content normal.         Judgment: Judgment normal.         LABS:    CBC:   Recent Labs     11/09/21  0521 11/10/21  0526 11/11/21  0557   WBC 10.1 8.7 6.7   HGB 10.6* 10.4* 10.2*   HCT 32.4* 31.3* 31.0*    413 419   MCV 80.5 81.0 80.9     Renal:    Recent Labs     11/09/21  0521 11/10/21  0525 11/10/21  0526 11/11/21  0557   *  --  138 138   K 4.1  --  4.2 4.2   CL 99  --  100 104   CO2 21  --  23 23   BUN 26*  --  22* 15   CREATININE 0.7*  --  0.8* 0.6*   GLUCOSE 296*  --  252* 231*   CALCIUM 8.7  --  8.8 8.6   MG  --  2.20  --  2.10   PHOS  --  3.8  --  3.6   ANIONGAP 13  --  15 11     Hepatic:   Recent Labs     11/08/21  1756 11/11/21  0557   AST 8*  --    ALT 10  --    BILITOT 0.3  --    PROT 7.9  --    LABALBU 3.8 3.3*   ALKPHOS 96  --      Troponin: No results for input(s): TROPONINI in the last 72 hours. BNP: No results for input(s): BNP in the last 72 hours. Lipids: No results for input(s): CHOL, HDL in the last 72 hours. Invalid input(s): LDLCALCU, TRIGLYCERIDE  ABGs:  No results for input(s): PHART, LIW4NIP, PO2ART, UXZ7CDP, BEART, THGBART, H8QCKUGB, LGK7XLF in the last 72 hours. INR:   Recent Labs     11/09/21  1046   INR 1.09     Lactate: No results for input(s): LACTATE in the last 72 hours. Cultures:  -----------------------------------------------------------------  RAD:   CT SOFT TISSUE NECK W CONTRAST   Final Result      Large ill-defined fluid collection centered along the inferior aspect of the left mandible measuring approximately 8.9 x 3.9 x 4.3 cm. No evidence of airway compromise. XR CHEST (2 VW)   Final Result      No evidence of acute cardiopulmonary disease. Assessment/Plan:     #sepsis 2/2 L facial abscess  - SIRS + on admission: tachycardic and WBC 11.8 => RESOLVED  - ENT consult: I&D on 11.9.21  - work on BG control: Lantus dose increased to 30 units BID with Lispro HDSSI  - abx: vanc, levoquin, flagyl    - Cx Anerobic n Aerobic: Normal oral lucy  Light growth No further workup   No Epithelial Cells seen   2+ WBC's (Polymorphonuclear)   1+ Gram positive cocci   1+ Gram negative rods    - Cx, surgical:  2+ Gram positive cocci   2+ WBC's (Polymorphonuclear)      - ID has been consulted      #DM2-last A1c 10.4 on 9/24   at [facility], down to 494 with 10 Regular Insulin. BG goal is < 180   - currently 206 (11:40 AM)   - Lantus 33 units BID  - Lispro 15 units 3x with meals  - MDSSI 3x with meals   - nightly correction Lispro also in place   - Repeat A1c: 11.6 (11.9.21)      #Major depressive disorder  Patient on 10 of Celexa and recently started on Trintellix. Reports taking both at home.  -Continue home meds    #HTN  - BP soft since yesterday  - holding lisinopril     ENT Updates: Penrose drain backed out 2 inches, suture cut and redundant drain end cut off.  Redressed    Code Status: Full Code  FEN: NPO  PPX: Lovenox  DISPO: Mary Shea MD, PGY-1  11/11/21  11:59 AM    This patient has been staffed and discussed with Cole Hyde MD.

## 2021-11-11 NOTE — PROGRESS NOTES
4 Eyes Admission Assessment     I agree as the admission nurse that 2 RN's have performed a thorough Head to Toe Skin Assessment on the patient. ALL assessment sites listed below have been assessed on admission. Areas assessed by both nurses:  [x]   Head, Face, and Ears   [x]   Shoulders, Back, and Chest  [x]   Arms, Elbows, and Hands- scattered non-healing scabs  [x]   Coccyx, Sacrum, and Ischium  [x]   Legs, Feet, and Heels - non-healing scab right knee        Does the Patient have Skin Breakdown?   No         Andrei Prevention initiated:  NA   Wound Care Orders initiated:  No      [facility] nurse consulted for Pressure Injury (Stage 3,4, Unstageable, DTI, NWPT, and Complex wounds) or Andrei score 18 or lower:  No      Nurse 1 eSignature: Electronically signed by Ailin Miramontes RN on 11/11/21 at 5:21 AM EST    **SHARE this note so that the co-signing nurse is able to place an eSignature**    Nurse 2 eSignature: Electronically signed by Chaparrita Morris RN on 11/11/21 at 5:21 AM EST

## 2021-11-11 NOTE — CARE COORDINATION
SW sent referrals to [facility] and [facility] to verify benefits for potential IV antibiotics as Pt has used them before.   SW notified Nils Simms from [facility] and Senthil Mcgregor from [facility] about referral.    Radhika Pineda, MSW, LSW  Social Work/Case Management   [facility]

## 2021-11-11 NOTE — PROGRESS NOTES
Wound care RN consult placed per protocol due to wounds on bilateral feet, MD aware    Podiatry consult recommended to MD as well - consult placed at 1130

## 2021-11-11 NOTE — PROGRESS NOTES
ENT POC    Penrose drain backed out 2 inches, suture cut and redundant drain end cut off. Redressed.     Doug Luna MD   Gen Surg PGY 4  11/11/2021  8:22 AM  [phone]

## 2021-11-11 NOTE — PROGRESS NOTES
Patient is A&Ox4. VSS. Patient is UAL. Patient is on a reg diet/3 carb choice. 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 Patient is tolerating fluids and meals find. Voiding fine, no BM. No needs at this time. Will continue to monitor.

## 2021-11-11 NOTE — PROGRESS NOTES
Surgery Daily Progress Note  Sandhyapauline Rosalino  CC:  Left facial abscess      Subjective :No acute events overrnight. Patient remains afebrile and HDS. States that less neck pain. Decrease in drainage      Objective    Infusions:   sodium chloride 25 mL (11/10/21 2057)    dextrose          I/O:I/O last 3 completed shifts: In: 780 [P.O.:780]  Out: 2025 [Urine:2025]           Wt Readings from Last 1 Encounters:   11/08/21 299 lb (135.6 kg)                 LABS:    Recent Labs     11/09/21  0521 11/10/21  0526   WBC 10.1 8.7   HGB 10.6* 10.4*   HCT 32.4* 31.3*   MCV 80.5 81.0    413        Recent Labs     11/09/21  0521 11/10/21  0525 11/10/21  0526   *  --  138   K 4.1  --  4.2   CL 99  --  100   CO2 21  --  23   PHOS  --  3.8  --    BUN 26*  --  22*   CREATININE 0.7*  --  0.8*        Recent Labs     11/08/21 1756   AST 8*   ALT 10   BILITOT 0.3   ALKPHOS 96      No results for input(s): LIPASE, AMYLASE in the last 72 hours. Recent Labs     11/08/21 1756 11/09/21  1046   PROT 7.9  --    INR  --  1.09      No results for input(s): CKTOTAL, CKMB, CKMBINDEX, TROPONINI in the last 72 hours. Exam:/65   Pulse 79   Temp 98.3 °F (36.8 °C) (Oral)   Resp 18   Ht 6' 2\" (1.88 m)   Wt 299 lb (135.6 kg)   SpO2 97%   BMI 38.39 kg/m²   General appearance: alert, appears stated age and cooperative  HEENT: Incision with minimal swelling, penrose in place with some seropurulent drainage.    Chest/Lungs: normal effort, symmetric chest rise, on RA  Cardiovascular: RRR  Abdomen: soft, non-tender, non-distended  Extremities: no edema, no cyanosis  Neuro: A&Ox3, no focal deficits, sensation intact    ASSESSMENT/PLAN: Pt. is a 35 y.o. male s/p  left facial abscess incision and drainage     - continue po pain medication  - continue carb control diet  - continue strict glucose control  - continue to change outer dressing prn, will discuss starting to back out penrose drain with staff  - follow up wound cultures, currently growing gram positive cocci        CAROLINE Saha - [facility] 11/11/2021 6:47 AM  [phone]

## 2021-11-11 NOTE — CONSULTS
Infectious Diseases Inpatient Consult Note    Reason for Consult:   Facial abscess  Requesting Physician:   Dr Sanjay High  Primary Care Physician:  Dinorah Hayes. Keke Moran MD, MD  History Obtained From:   Pt, EPIC    Admit Date: 11/8/2021  Hospital Day: 4    CHIEF COMPLAINT:       Chief Complaint   Patient presents with    Abscess     left jaw       HISTORY OF PRESENT ILLNESS:      36 yo man with hx DM, obesity (BMI 38), HTN, HL, OA  Hx DM foot infection / toe amputations - 2015, 2018, 2019    Pt developed L lower face / upper neck swelling and pain in late October. He saw Dentist 10/27, given augmentin. F/u 11/4, had L lower premolar tooth extraction (#18), had 1 more day augmentin   Pt developed increase in L lower face / jaw line swelling and pain.   No fever / chills    Presents   In ED 11/8 - afeb, WBC 11.8,  CT with 'large ill-defined fluid colllection'  Seen by ENT, taken to OR 11/9, I&D abscess    Today 11/11, afeb  Feels better  On vancomycin, levofloxacin      Past Medical History:    Past Medical History:   Diagnosis Date    Depression     [facility] mellitus [facility]     Hyperlipidemia     Hypertension     Osteoarthritis of both knees        Past Surgical History:    Past Surgical History:   Procedure Laterality Date    FOOT SURGERY Left 02/19/2018     INCISION AND DRAINAGE WITH HALLUX AMPUTATION LEFT FOOT    KNEE SURGERY Right 6/19/2013    NECK SURGERY Left 11/9/2021    LEFT FACIAL ABSCESS INCISION AND DRAINAGE performed by Kodi Perdomo MD at [address] Left     2nd toe    TOE AMPUTATION Right 9/26/2019    RIGHT HALLUX AMPUTATION WITH POSSIBLE 1ST RAY AMPUTATION performed by Ronal Mejía DPM at [address]       Current Medications:     insulin lispro (1 Unit Dial)  15 Units SubCUTAneous TID AC    insulin glargine  33 Units SubCUTAneous BID    insulin lispro  0-12 Units SubCUTAneous TID WC    insulin lispro  0-6 Units SubCUTAneous Nightly    saccharomyces boulardii  250 mg Oral BID    vancomycin  1,500 mg IntraVENous Q12H    levofloxacin  500 mg IntraVENous Q24H    sodium chloride  1,000 mL IntraVENous Once    atorvastatin  40 mg Oral Daily    buprenorphine-naloxone  4 tablet SubLINGual Daily    citalopram  10 mg Oral Daily    gabapentin  600 mg Oral TID    [Held by provider] lisinopril  40 mg Oral Daily    senna  2 tablet Oral Daily    VORTIoxetine HBr  20 mg Oral Daily    sodium chloride flush  5-40 mL IntraVENous 2 times per day    enoxaparin  40 mg SubCUTAneous Daily       Allergies:  Cephalexin    Social History:    TOBACCO:    None   ETOH:    None   DRUGS:   None   MARITAL STATUS:   Single   OCCUPATION:   Unemployed     Family History:   No immunodeficiency    REVIEW OF SYSTEMS:    No fever / chills / sweats. No weight loss. No visual change, eye pain, eye discharge. No oral lesion, sore throat, dysphagia. Denies cough / sputum. Denies chest pain, palpitations. Denies n / v / abd pain. No diarrhea. Denies dysuria or change in urinary function. Denies joint swelling or pain. No myalgia, arthralgia. Denies skin changes, itching  Denies focal weakness, sensory change or other neurologic symptom    Denies new / worse depression, psychiatric symptoms    PHYSICAL EXAM:      Vitals:    /76   Pulse 95   Temp 98.1 °F (36.7 °C) (Oral)   Resp 16   Ht 6' 2\" (1.88 m)   Wt 299 lb (135.6 kg)   SpO2 98%   BMI 38.39 kg/m²     GENERAL: No apparent distress.   RA  HEENT: Membranes moist, no oral lesion - extraction site with no swelling / tenderness  Swelling over mandible / jaw line with upper neck wound (has penrose), skin with bread, no redness, no induration  NECK:  Supple, no lymphadenopathy  LUNGS: Clear b/l, no rales, no dullness  CARDIAC: RRR, no murmur appreciated  ABD:  + BS, soft / NT  EXT:  No rash, no edema, no lesions  NEURO: No focal neurologic findings  PSYCH: Orientation, sensorium, mood normal  LINES:  Peripheral iv    DATA:    Lab Results Component Value Date    WBC 6.7 2021    HGB 10.2 (L) 2021    HCT 31.0 (L) 2021    MCV 80.9 2021     2021     Lab Results   Component Value Date    CREATININE 0.6 (L) 2021    BUN 15 2021     2021    K 4.2 2021     2021    CO2 23 2021       Hepatic Function Panel:   Lab Results   Component Value Date    ALKPHOS 96 2021    ALT 10 2021    AST 8 2021    PROT 7.9 2021    BILITOT 0.3 2021    LABALBU 3.3 2021       Micro:   Neck GS 2+WBC, 2+GPC; cult in process     Surg cult: GS 2+WBC, 1+GPC, 1+GNR; cult normal oral lucy    Imagin/8 Neck soft tissue CT  Large ill-defined fluid collection centered along the inferior aspect of the left mandible measuring approximately 8.9 x 3.9 x 4.3 cm. No evidence of airway compromise     CXR  'No evidence of acute cardiopulmonary disease. '    IMPRESSION:      Patient Active Problem List   Diagnosis    Gangrene of toe of left foot ([address]    Type 2 diabetes mellitus ([address]    Essential hypertension    Obesity, Class III, BMI 40-49.9 (morbid obesity) ([address]    Diabetic foot infection ([address]    Toe osteomyelitis, left (HCC)    Toe necrosis (HCC)    Diabetic polyneuropathy ([address]    Facial abscess    Neck abscess       Hx DM, obesity (BMI 38), HTN, HL, OA  Hx DM foot infection, multiple toe amputations    Dental infection, s/p L lower premolar extraction  L facial abscess   - cult oral lucy   - s/p drainage   - appears decompressed   Leukocytosis, improved     I am concerned about the severity of infection that lead to facial, deep space abscess from mandible  Favor placing PICC and giving iv  antibiotics    RECOMMENDATIONS:    Change to unasyn  Will f/u on pending cult  Will review CT (any bone changes?)    Postop care per ENT    PICC  See MUNIRA -will use ertapenem for broad activity and once daily dosing     Medical Decision Making:   The following items were considered in medical decision making:  Discussion of patient care with other providers  Reviewed clinical lab tests  Reviewed radiology tests  Reviewed other diagnostic tests/interventions  Independent review of radiologic images - will review with Cardiologist  Microbiology cultures and other micro tests reviewed      Risk of Complications/Morbidity: High   Illness(es)/ Infection present that pose threat to bodily function. There is potential for severe exacerbation of infection/side effects of treatment.   Therapy requires intensive monitoring for antimicrobial agent toxicity    Discussed with pt, Discharge Nicki Rueda MD    INFUSION ORDERS:  Invanz 1 gm iv daily through 12/1  - Diagnosis - dental infection, L face abscess  - First dose given in hospital  - PICC   - Disposition / date discharge  - Check CBC w diff, CMP, ESR, CRP every Mon or Tue - FAX result to [phone]  - Call with antibiotic / infusion issues, [phone]  - No f/u in outpatient ID office necessary

## 2021-11-11 NOTE — PLAN OF CARE
Problem: Pain:  Goal: Pain level will decrease  Description: Pain level will decrease  Outcome: Ongoing  Note: Patient states denies pain during shift at this time. Problem: Skin Integrity:  Goal: Will show no infection signs and symptoms  Description: Will show no infection signs and symptoms  Outcome: Ongoing  Note: Patient has un-healed wounds but no further skin issues during shift. Patient is able to get up as needed. Problem: Nutrition  Goal: Optimal nutrition therapy  Outcome: Ongoing  Note: Patent is tolerating reg 3carb meal diet well.

## 2021-11-11 NOTE — PROCEDURES
SINGLE PICC PROCEDURE NOTE  Chart reviewed for allergies, diagnosis, labs, known contraindications, reason for line placement and planned length of treatment. Informed consent noted to be signed and on chart. Insertion procedure discussed with patient/family member. Three patient identifiers - Patient name,   and MRN -  completed &  confirmed verbally. Time out performed Hat, mask and eye shield donned. PICC site cleaned with chlorhexidine wipes then scrubbed with Chloraprep  One-Step applicator for 30 seconds x 1. Hand Hygiene  performed with 3% Chlorhexidine surgical scrub x1 min prior to  Sterile gloves, sterile gown being donned. Patient draped using maximal sterile barrier technique ( head to toe ). PICC site scrubbed a 2nd time with Chloraprep One-Step applicator x 30 sec. Modified Seldinger  technique/ultrasound assisted and tip locating system utilized for insertion. 1% Lidocaine 5 ml injected intradermal pre-insertion. PICC tip location in the SVC confirmed by ECG technology Sherlock 3CG. Positive brisk blood return obtained from all lumens  and each lumen flushed with 10 mls  0.9% Sterile Sodium Chloride. All lumens flush easily with no resistance. Valve placed on all lumens followed by Alcohol Swab Caps on end of each. Bio-patch in place. Catheter secured with non-sutured locking device per hospital protocol. Sterile Tegaderm applied over PICC site. Sterile field maintained during procedure. PICC insertion, rhythm and positioning wire (utilized prn) accounted for post procedure and disposed of in sharps. Appearance of site is Clean dry and intact without bleeding or edema. All edges of Tegaderm occlusive. Site marked with date and initials of RN placing line. Teaching performed to pt/family and noted in education section. Bed placed in low position post-procedure.  Top 2 side rails in up position call button in reach. Pt. Response to procedure tolerated well. RN notified of all of the above. A Single Lumen Power Injectable PICC was trimmed at 49 CM and placed  AYE per basilic vein, 2 cm showing from insertion site.

## 2021-11-11 NOTE — CARE COORDINATION
CTN contacted [facility] with [employer] [phone]. They have accepted this patient and will pull referral from [facility].  They will contact patient and make arrangements for [facility] by 11/13  Electronically signed by Norman Kimble LPN on 43/34/9957 at 3:55 PM

## 2021-11-11 NOTE — CONSULTS
Department of Podiatry Consult Note  Resident       Reason for Consult: Foot Wounds  Requesting Physician:  Dr. Lucero James MD    CHIEF COMPLAINT:  Foot Wounds    HISTORY OF PRESENT ILLNESS:    The patient is a 35 y.o. male with significant past medical history as listed below who is consulted to for foot wounds. Patient presented to the hospital secondary to large left facial abscess, patient was then admit for draining abscess. He states that he has had these foot wounds for over a year. He states that he follows with  podiatry specifically Dr. Mary Vasquez D.P.M., and has an appointment with him this month. He states that the wounds do not drain but he puts Betadine and sometimes antibiotic ointment on them. He states he has uncontrolled diabetes and every time he gets his A1c checked it is over 10. He denies any pain to the feet. Patient denies any other pedal complaints at this time. He denies any nausea, vomiting, fever, chills, shortness of breath. Past Medical History:        Diagnosis Date    Depression     Diabetes mellitus ([facility] [address]     Hyperlipidemia     Hypertension     Osteoarthritis of both knees        Past Surgical History:        Procedure Laterality Date    FOOT SURGERY Left 02/19/2018     INCISION AND DRAINAGE WITH HALLUX AMPUTATION LEFT FOOT    KNEE SURGERY Right 6/19/2013    NECK SURGERY Left 11/9/2021    LEFT FACIAL ABSCESS INCISION AND DRAINAGE performed by Brandt Crowell MD at [place] [address] Left     2nd toe    TOE AMPUTATION Right 9/26/2019    RIGHT HALLUX AMPUTATION WITH POSSIBLE 1ST RAY AMPUTATION performed by Trupti Baker DPM at [facility] OR       Allergies:   Cephalexin    Medications:   Home Meds  No current facility-administered medications on file prior to encounter.      Current Outpatient Medications on File Prior to Encounter   Medication Sig Dispense Refill    empagliflozin (JARDIANCE) 10 MG tablet Take 10 mg by mouth daily      gabapentin (NEURONTIN) 600 MG tablet Take 600 mg by mouth 3 times daily.  citalopram (CELEXA) 10 MG tablet TAKE 1 TABLET BY MOUTH EVERY DAY      VORTIoxetine HBr (TRINTELLIX) 20 MG TABS tablet Take 20 mg by mouth daily      hydrochlorothiazide (MICROZIDE) 12.5 MG capsule Take 12.5 mg by mouth daily      Cholecalciferol (VITAMIN D3) 2000 units CAPS Take by mouth      insulin glargine (LANTUS) 100 UNIT/ML injection pen Inject 40 Units into the skin nightly (Patient taking differently: Inject 30 Units into the skin 2 times daily ) 5 pen 3    atorvastatin (LIPITOR) 40 MG tablet Take 40 mg by mouth daily      buprenorphine-naloxone (SUBOXONE) 8-2 MG SUBL SL tablet Place 1 tablet under the tongue daily.        lisinopril (PRINIVIL;ZESTRIL) 40 MG tablet Take 40 mg by mouth daily      vitamin D (ERGOCALCIFEROL) 1.25 MG (90695 UT) CAPS capsule TAKE 1 CAPSULE BY MOUTH ONE TIME A WEEK      naproxen (NAPROSYN) 500 MG tablet Take 500 mg by mouth 2 times daily (with meals)      senna (SENOKOT) 8.6 MG tablet Take 2 tablets by mouth daily      insulin lispro (HUMALOG) 100 UNIT/ML injection vial Inject 30 Units into the skin 3 times daily (before meals) May add insulin depending on sugar/         Current Meds  insulin lispro (1 Unit Dial) 15 Units, TID AC  insulin glargine (LANTUS;BASAGLAR) injection pen 33 Units, BID  insulin lispro (1 Unit Dial) 0-12 Units, TID WC  insulin lispro (1 Unit Dial) 0-6 Units, Nightly  saccharomyces boulardii (FLORASTOR) capsule 250 mg, BID  vancomycin (VANCOCIN) 1,500 mg in sodium chloride 0.9 % 250 mL IVPB, Q12H  levoFLOXacin (LEVAQUIN) 500 MG/100ML infusion 500 mg, Q24H  diphenhydrAMINE (BENADRYL) injection 25 mg, Q6H PRN  0.9 % sodium chloride bolus, Once  atorvastatin (LIPITOR) tablet 40 mg, Daily  buprenorphine-naloxone (SUBOXONE) 2-0.5 MG SL tablet 4 tablet, Daily  citalopram (CELEXA) tablet 10 mg, Daily  gabapentin (NEURONTIN) tablet 600 mg, TID  [Held by provider] lisinopril (PRINIVIL;ZESTRIL) tablet 40 mg, Daily  senna (SENOKOT) tablet 17.2 mg, Daily  VORTIoxetine (TRINTELLIX) tablet 20 mg, Daily  sodium chloride flush 0.9 % injection 5-40 mL, 2 times per day  sodium chloride flush 0.9 % injection 5-40 mL, PRN  0.9 % sodium chloride infusion, PRN  enoxaparin (LOVENOX) injection 40 mg, Daily  ondansetron (ZOFRAN-ODT) disintegrating tablet 4 mg, Q8H PRN   Or  ondansetron (ZOFRAN) injection 4 mg, Q6H PRN  polyethylene glycol (GLYCOLAX) packet 17 g, Daily PRN  acetaminophen (TYLENOL) tablet 650 mg, Q6H PRN   Or  acetaminophen (TYLENOL) suppository 650 mg, Q6H PRN  glucose (GLUTOSE) 40 % oral gel 15 g, PRN  dextrose 50 % IV solution, PRN  glucagon (rDNA) injection 1 mg, PRN  dextrose 5 % solution, PRN        Family History:   History reviewed. No pertinent family history. Social History:   TOBACCO:   reports that he has never smoked. He has never used smokeless tobacco.  ETOH:   reports no history of alcohol use. DRUGS:   reports no history of drug use. REVIEW OF SYSTEMS:    A 10 point review of systems was conducted, significant findings as noted in HPI. All other systems negative. PHYSICAL EXAM:      Vitals:    /76   Pulse 95   Temp 98.1 °F (36.7 °C) (Oral)   Resp 16   Ht 6' 2\" (1.88 m)   Wt 299 lb (135.6 kg)   SpO2 98%   BMI 38.39 kg/m²     LABS:   Recent Labs     11/10/21  0526 11/11/21  0557   WBC 8.7 6.7   HGB 10.4* 10.2*   HCT 31.3* 31.0*    419     Recent Labs     11/11/21  0557      K 4.2      CO2 23   PHOS 3.6   BUN 15   CREATININE 0.6*     Recent Labs     11/08/21  1756 11/09/21  1046   PROT 7.9  --    INR  --  1.09         GENERAL: Patient is alert and oriented x3. No signs of acute distress noted. LOWER EXTREMITY EXAMINATION:  VASCULAR: DP and PT pulses are non-palpable 0/4 b/l, upon hand-held Doppler examination the DP and PT pulses were noted to be biphasic bilaterally.  CFT is brisk to the TMA stump on the right and remaining digits to the left. Skin temperature is warm to cool from proximal to distal with no focal calor noted. No edema noted. No pain with calf compression b/l. NEUROLOGIC: Gross and epicritic sensation is diminished b/l. Protective sensation is diminished at all pedal sites b/l. DERMATOLOGIC:   Patient provided verbal consent for photos to be obtained today, 11/11/21   Right lower extremity:        Multiple superficial abrasions noted to the plantar lateral aspect of the foot and distal TMA stump. The abrasion base is pink dermal tissue. The wound does not probe. No purulent drainage, tracking, tunneling, crepitus, fluctuance or acute signs of infection noted. Left lower extremity:    Multiple superficial abrasions noted to digits 3, 4, and 5. Wound base is granular in nature. The wound does not probe. No purulent drainage, tracking, tunneling, crepitus, fluctuance, or acute signs of infection noted. Pink discoloration noted to the anterior ankle of the left lower extremity. MUSCULOSKELETAL: Muscle strength is 5/5 for all pedal groups tested. No pain with palpation of the foot or ankle b/l. Ankle joint ROM is decreased in dorsiflexion with the knee extended. No obvious biomechanical abnormalities. Transmetatarsal amputation noted to right LE, and partial first digit and second digit amputation to the left lower extremity.        ASSESSMENT/PLAN:   -Multiple superficial abrasions; bilateral lower extremity  -Pressure injury; left anterior ankle-NPUAP Stage 1  -Diabetes mellitus with peripheral neuropathy  -History of amputations; bilateral lower extremity      -Patient seen and examined at bedside this PM  -VSS, No leukocytosis noted (WBC 6.7)  -Wound culture not obtained at this time 2/2 no active drainage  -Continue IV antibiotics per primary team for facial abscess  -No antibiotics warranted from a podiatric standpoint 2/2 no acute signs of infection to the feet  -Biateral lower extremity dressed with saline soaked gauze, DSD, Ace  -Prevalon boots ordered. Patient is to wear at all times while in bed to prevent further deep tissue injury.  -Post-op shoe order to the room  -Weightbearing as tolerated to bilateral lower extremity in postop shoe or normal shoe gear      DISPO: Multiple superficial abrasions to bilateral lower extremity. Labs and imaging not ordered due to no acute signs of infection. Abrasions are stable at this time. We will follow patient closely.     The patient assessment and plan was discussed with Jarek Calero DPM.    Wm Bower DPM   Podiatric Resident PGY1  Pager [phone] or PerfectServe  11/11/2021, 3:45 PM

## 2021-11-12 LAB
ALBUMIN SERPL-MCNC: 2.9 G/DL (ref 3.4–5)
ANION GAP SERPL CALCULATED.3IONS-SCNC: 15 MMOL/L (ref 3–16)
BASOPHILS ABSOLUTE: 0.1 K/UL (ref 0–0.2)
BASOPHILS RELATIVE PERCENT: 0.9 %
BUN BLDV-MCNC: 16 MG/DL (ref 7–20)
CALCIUM SERPL-MCNC: 8.3 MG/DL (ref 8.3–10.6)
CHLORIDE BLD-SCNC: 105 MMOL/L (ref 99–110)
CO2: 19 MMOL/L (ref 21–32)
CREAT SERPL-MCNC: 0.6 MG/DL (ref 0.9–1.3)
EOSINOPHILS ABSOLUTE: 0.2 K/UL (ref 0–0.6)
EOSINOPHILS RELATIVE PERCENT: 3.5 %
GFR AFRICAN AMERICAN: >60
GFR NON-AFRICAN AMERICAN: >60
GLUCOSE BLD-MCNC: 173 MG/DL (ref 70–99)
GLUCOSE BLD-MCNC: 182 MG/DL (ref 70–99)
GLUCOSE BLD-MCNC: 185 MG/DL (ref 70–99)
GLUCOSE BLD-MCNC: 232 MG/DL (ref 70–99)
GLUCOSE BLD-MCNC: 241 MG/DL (ref 70–99)
HCT VFR BLD CALC: 30.4 % (ref 40.5–52.5)
HEMOGLOBIN: 9.9 G/DL (ref 13.5–17.5)
LYMPHOCYTES ABSOLUTE: 2.2 K/UL (ref 1–5.1)
LYMPHOCYTES RELATIVE PERCENT: 33.7 %
MAGNESIUM: 1.9 MG/DL (ref 1.8–2.4)
MCH RBC QN AUTO: 27 PG (ref 26–34)
MCHC RBC AUTO-ENTMCNC: 32.7 G/DL (ref 31–36)
MCV RBC AUTO: 82.4 FL (ref 80–100)
MONOCYTES ABSOLUTE: 0.5 K/UL (ref 0–1.3)
MONOCYTES RELATIVE PERCENT: 7.2 %
NEUTROPHILS ABSOLUTE: 3.6 K/UL (ref 1.7–7.7)
NEUTROPHILS RELATIVE PERCENT: 54.7 %
PDW BLD-RTO: 14 % (ref 12.4–15.4)
PERFORMED ON: ABNORMAL
PHOSPHORUS: 3.8 MG/DL (ref 2.5–4.9)
PLATELET # BLD: 392 K/UL (ref 135–450)
PMV BLD AUTO: 7.2 FL (ref 5–10.5)
POTASSIUM SERPL-SCNC: 4.1 MMOL/L (ref 3.5–5.1)
RBC # BLD: 3.68 M/UL (ref 4.2–5.9)
SODIUM BLD-SCNC: 139 MMOL/L (ref 136–145)
WBC # BLD: 6.5 K/UL (ref 4–11)

## 2021-11-12 PROCEDURE — 6360000002 HC RX W HCPCS: Performed by: INTERNAL MEDICINE

## 2021-11-12 PROCEDURE — 6370000000 HC RX 637 (ALT 250 FOR IP): Performed by: STUDENT IN AN ORGANIZED HEALTH CARE EDUCATION/TRAINING PROGRAM

## 2021-11-12 PROCEDURE — 85025 COMPLETE CBC W/AUTO DIFF WBC: CPT

## 2021-11-12 PROCEDURE — 2580000003 HC RX 258

## 2021-11-12 PROCEDURE — 2580000003 HC RX 258: Performed by: STUDENT IN AN ORGANIZED HEALTH CARE EDUCATION/TRAINING PROGRAM

## 2021-11-12 PROCEDURE — 1200000000 HC SEMI PRIVATE

## 2021-11-12 PROCEDURE — 99232 SBSQ HOSP IP/OBS MODERATE 35: CPT | Performed by: INTERNAL MEDICINE

## 2021-11-12 PROCEDURE — 2580000003 HC RX 258: Performed by: INTERNAL MEDICINE

## 2021-11-12 PROCEDURE — 6370000000 HC RX 637 (ALT 250 FOR IP): Performed by: INTERNAL MEDICINE

## 2021-11-12 PROCEDURE — 6360000002 HC RX W HCPCS: Performed by: STUDENT IN AN ORGANIZED HEALTH CARE EDUCATION/TRAINING PROGRAM

## 2021-11-12 PROCEDURE — 36415 COLL VENOUS BLD VENIPUNCTURE: CPT

## 2021-11-12 PROCEDURE — 6360000002 HC RX W HCPCS

## 2021-11-12 PROCEDURE — 83735 ASSAY OF MAGNESIUM: CPT

## 2021-11-12 PROCEDURE — 80069 RENAL FUNCTION PANEL: CPT

## 2021-11-12 RX ADMIN — INSULIN LISPRO 15 UNITS: 100 INJECTION, SOLUTION INTRAVENOUS; SUBCUTANEOUS at 16:19

## 2021-11-12 RX ADMIN — Medication 250 MG: at 20:47

## 2021-11-12 RX ADMIN — SODIUM CHLORIDE, PRESERVATIVE FREE 10 ML: 5 INJECTION INTRAVENOUS at 20:48

## 2021-11-12 RX ADMIN — ATORVASTATIN CALCIUM 40 MG: 40 TABLET, FILM COATED ORAL at 09:34

## 2021-11-12 RX ADMIN — VORTIOXETINE 20 MG: 10 TABLET, FILM COATED ORAL at 09:47

## 2021-11-12 RX ADMIN — AMPICILLIN SODIUM AND SULBACTAM SODIUM 3000 MG: 2; 1 INJECTION, POWDER, FOR SOLUTION INTRAMUSCULAR; INTRAVENOUS at 04:41

## 2021-11-12 RX ADMIN — AMPICILLIN SODIUM AND SULBACTAM SODIUM 3000 MG: 2; 1 INJECTION, POWDER, FOR SOLUTION INTRAMUSCULAR; INTRAVENOUS at 20:44

## 2021-11-12 RX ADMIN — CITALOPRAM HYDROBROMIDE 10 MG: 10 TABLET ORAL at 09:34

## 2021-11-12 RX ADMIN — ENOXAPARIN SODIUM 40 MG: 100 INJECTION SUBCUTANEOUS at 09:34

## 2021-11-12 RX ADMIN — GABAPENTIN 600 MG: 600 TABLET, FILM COATED ORAL at 20:47

## 2021-11-12 RX ADMIN — STANDARDIZED SENNA CONCENTRATE 17.2 MG: 8.6 TABLET ORAL at 09:34

## 2021-11-12 RX ADMIN — ACETAMINOPHEN 650 MG: 325 TABLET ORAL at 09:34

## 2021-11-12 RX ADMIN — INSULIN LISPRO 2 UNITS: 100 INJECTION, SOLUTION INTRAVENOUS; SUBCUTANEOUS at 09:45

## 2021-11-12 RX ADMIN — SODIUM CHLORIDE, PRESERVATIVE FREE 10 ML: 5 INJECTION INTRAVENOUS at 20:47

## 2021-11-12 RX ADMIN — BUPRENORPHINE AND NALOXONE 4 TABLET: 2; .5 TABLET SUBLINGUAL at 09:33

## 2021-11-12 RX ADMIN — INSULIN LISPRO 2 UNITS: 100 INJECTION, SOLUTION INTRAVENOUS; SUBCUTANEOUS at 13:09

## 2021-11-12 RX ADMIN — INSULIN LISPRO 15 UNITS: 100 INJECTION, SOLUTION INTRAVENOUS; SUBCUTANEOUS at 11:53

## 2021-11-12 RX ADMIN — AMPICILLIN SODIUM AND SULBACTAM SODIUM 3000 MG: 2; 1 INJECTION, POWDER, FOR SOLUTION INTRAMUSCULAR; INTRAVENOUS at 10:46

## 2021-11-12 RX ADMIN — GABAPENTIN 600 MG: 600 TABLET, FILM COATED ORAL at 09:33

## 2021-11-12 RX ADMIN — INSULIN LISPRO 15 UNITS: 100 INJECTION, SOLUTION INTRAVENOUS; SUBCUTANEOUS at 09:44

## 2021-11-12 RX ADMIN — INSULIN LISPRO 2 UNITS: 100 INJECTION, SOLUTION INTRAVENOUS; SUBCUTANEOUS at 16:18

## 2021-11-12 RX ADMIN — INSULIN LISPRO 2 UNITS: 100 INJECTION, SOLUTION INTRAVENOUS; SUBCUTANEOUS at 21:02

## 2021-11-12 RX ADMIN — Medication 250 MG: at 09:34

## 2021-11-12 RX ADMIN — AMPICILLIN SODIUM AND SULBACTAM SODIUM 3000 MG: 2; 1 INJECTION, POWDER, FOR SOLUTION INTRAMUSCULAR; INTRAVENOUS at 13:55

## 2021-11-12 RX ADMIN — MAGNESIUM SULFATE HEPTAHYDRATE 1000 MG: 500 INJECTION, SOLUTION INTRAMUSCULAR; INTRAVENOUS at 12:03

## 2021-11-12 RX ADMIN — GABAPENTIN 600 MG: 600 TABLET, FILM COATED ORAL at 13:57

## 2021-11-12 ASSESSMENT — PAIN SCALES - GENERAL
PAINLEVEL_OUTOF10: 0
PAINLEVEL_OUTOF10: 7
PAINLEVEL_OUTOF10: 0
PAINLEVEL_OUTOF10: 0

## 2021-11-12 NOTE — DISCHARGE INSTR - COC
Continuity of Care Form    Patient Name: Georgi Riedel   :  1980  MRN:  [id number]    Admit date:  2021  Discharge date:  ***    Code Status Order: Full Code   Advance Directives:      Admitting Physician:  Tanvir Guillen DO  PCP: Jessica Mishra.  Yousuf Coronado MD, MD    Discharging Nurse: [facility] Unit/Room#: 0873/8059-05  Discharging Unit Phone Number: ***    Emergency Contact:   Extended Emergency Contact Information  Primary Emergency Contact: HardenWilver  Address: [address] [place] [address] Phone: [phone]  Mobile Phone: [phone]  Relation: Parent  Secondary Emergency Contact: None,Per Pt  Relation: Other    Past Surgical History:  Past Surgical History:   Procedure Laterality Date    FOOT SURGERY Left 2018     INCISION AND DRAINAGE WITH HALLUX AMPUTATION LEFT FOOT    KNEE SURGERY Right 2013    NECK SURGERY Left 2021    LEFT FACIAL ABSCESS INCISION AND DRAINAGE performed by Ivan Mosqueda MD at [place] [address] Left     2nd toe    TOE AMPUTATION Right 2019    RIGHT HALLUX AMPUTATION WITH POSSIBLE 1ST RAY AMPUTATION performed by Ariana Farris DPM at [facility] [address]       Immunization History:   Immunization History   Administered Date(s) Administered    COVID-19, ANJELICA Pérez, 30mcg/0.3mL 2021, 04/10/2021, 10/10/2021    Hepatitis B 2015, 10/04/2015, 2016    Influenza, Quadv, IM, PF (6 mo and older Fluzone, Flulaval, Fluarix, and 3 yrs and older Afluria) 2019    Pneumococcal Polysaccharide ([id number]) 2013    Tdap (Boostrix, Adacel) 2013       Active Problems:  Patient Active Problem List   Diagnosis Code    Gangrene of toe of left foot ([facility] I96    Type 2 diabetes mellitus ([facility] [address] E11.9    Essential hypertension I10    Obesity, Class III, BMI 40-49.9 (morbid obesity) ([facility] [address] E66.01    Diabetic foot infection ([facility] E11.628, L08.9    Toe osteomyelitis, left (HCC) M86.9    Toe necrosis ([address] I96    Diabetic polyneuropathy (HCC) E11.42    Facial abscess L02.01    Neck abscess L02.11       Isolation/Infection:   Isolation            No Isolation          Unreconciled Outside Infections       Enable clinical decision support by reconciling outside information with the patient's chart. .      Infection Onset Last Indicated Last Received Source    MRSA [facility]          Patient Infection Status       None to display            Nurse Assessment:  Last Vital Signs: /77   Pulse 64   Temp 97.8 °F (36.6 °C) (Oral)   Resp 16   Ht 6' 2\" (1.88 m)   Wt 299 lb (135.6 kg)   SpO2 99%   BMI 38.39 kg/m²     Last documented pain score (0-10 scale): Pain Level: 0  Last Weight:   Wt Readings from Last 1 Encounters:   21 299 lb (135.6 kg)     Mental Status:  {IP PT MENTAL STATUS:64247}    IV Access:  { MUNIRA IV ACCESS:385739719}    Nursing Mobility/ADLs:  Walking   {CHP DME CMTH:441304899}  Transfer  {CHP DME DRY}  Bathing  {CHP DME IYEQ:675263746}  Dressing  {CHP DME KHMJ:217497656}  Toileting  {CHP DME MFPF:446204135}  Feeding  {CHP DME QKVM:453232945}  Med Admin  {CHP DME UISU:577497363}  Med Delivery   { MUNIRA MED Delivery:409059338}    Wound Care Documentation and Therapy:  Wound 19 Toe (Comment  which one) Right necrotic toe (Active)   Number of days: 779       Wound 19 Knee Right (Active)   Number of days: 262       Wound 19 Foot Left (Active)   Number of days: 779        Elimination:  Continence: Bowel: {YES / ON:03966}  Bladder: {YES / OL:29447}  Urinary Catheter: {Urinary Catheter:940459847}   Colostomy/Ileostomy/Ileal Conduit: {YES / YZ:46617}       Date of Last BM: ***    Intake/Output Summary (Last 24 hours) at 2021 0820  Last data filed at 2021 0549  Gross per 24 hour   Intake 1902.45 ml   Output 1350 ml   Net 552.45 ml     I/O last 3 completed shifts: In: 1902.5 [P.O.:720; I.V.:150.4;  IV CRP every Mon or Tue - FAX result to [phone]  - Call with antibiotic / infusion issues, [phone]  - No f/u in outpatient ID office necessary    Physician Certification: I certify the above information and transfer of Arin Bateman  is necessary for the continuing treatment of the diagnosis listed and that he requires {Admit to Appropriate Level of Care:45438} for {GREATER/LESS:888472690} 30 days.      Update Admission H&P: {CHP DME Changes in LGNPZ:444972864}    PHYSICIAN SIGNATURE:  {Esignature:636372131}

## 2021-11-12 NOTE — FLOWSHEET NOTE
11/12/21 0953   Encounter Summary   Services provided to: Patient   Referral/Consult From: Rounding   Continue Visiting   (11/12/2021, ASHWINI. )   Complexity of Encounter Moderate   Length of Encounter 15 minutes   Routine   Type Initial   Assessment Calm;  Approachable   Intervention Nurtured hope   Outcome Expressed gratitude

## 2021-11-12 NOTE — PROGRESS NOTES
Surgery Daily Progress Note  Jalyn Verduzco  CC:  Left facial abscess      Subjective :  No acute events overnight, still with serosanguinous drainage out of the neck. Penrose in place. Objective    Infusions:   sodium chloride      sodium chloride 100 mL/hr at 11/10/21 2240    dextrose          I/O:I/O last 3 completed shifts: In: 1902.5 [P.O.:720; I.V.:150.4; IV Piggyback:1032]  Out: 2250 [Urine:2250]           Wt Readings from Last 1 Encounters:   11/08/21 299 lb (135.6 kg)                 LABS:    Recent Labs     11/11/21  0557 11/12/21  0557   WBC 6.7 6.5   HGB 10.2* 9.9*   HCT 31.0* 30.4*   MCV 80.9 82.4    392        Recent Labs     11/10/21  0525 11/10/21  0526 11/11/21  0557   NA  --  138 138   K  --  4.2 4.2   CL  --  100 104   CO2  --  23 23   PHOS 3.8  --  3.6   BUN  --  22* 15   CREATININE  --  0.8* 0.6*        No results for input(s): AST, ALT, ALB, BILIDIR, BILITOT, ALKPHOS in the last 72 hours. No results for input(s): LIPASE, AMYLASE in the last 72 hours. Recent Labs     11/09/21  1046   INR 1.09      No results for input(s): CKTOTAL, CKMB, CKMBINDEX, TROPONINI in the last 72 hours. Exam:/77   Pulse 64   Temp 97.8 °F (36.6 °C) (Oral)   Resp 16   Ht 6' 2\" (1.88 m)   Wt 299 lb (135.6 kg)   SpO2 99%   BMI 38.39 kg/m²   General appearance: alert, appears stated age and cooperative  HEENT: Incision with still some lateral edema and erythema, penrose in place with some serosanguinopurulent drainage.    Chest/Lungs: normal effort, symmetric chest rise, on RA  Cardiovascular: RRR  Abdomen: soft, non-tender, non-distended  Extremities: no edema, no cyanosis  Neuro: A&Ox3, no focal deficits, sensation intact    ASSESSMENT/PLAN: Pt. is a 35 y.o. male s/p  left facial abscess incision and drainage     - continue po pain medication  - continue carb control diet  - continue strict glucose control  - continue to change outer dressing prn  - follow up wound cultures, currently growing strep anginosis, awaiting sensitives  - surgery team to continue to back out penrose 2-3 \" BID, will return later this AM to back it out      Crystal Saldivar,   PGY3, General Surgery  11/12/21  7:19 AM

## 2021-11-12 NOTE — PROGRESS NOTES
Progress Note    Admit Date: 11/8/2021  Day: 4  Diet: ADULT DIET; Regular; 3 carb choices (45 gm/meal)  ADULT ORAL NUTRITION SUPPLEMENT; Lunch; Diabetic Oral Supplement    CC: Left Face Abscess    Interval history:  - patient doing well this AM, no complaints  - nothing significant on ROS: no SOB, CP, ab pain, N/V, dysuria  - had BM (started on probiotics)   - patient's feet wrapped and in boots per podiatry for SF abrasion  - Abscess has been incised and drained 11.9.21 - today looks much better and decreased in size - drain continues to be pulled by surgery     HPI:  Les Peters is a 35 y.o., male with PMH of poorly controlled diabetes, hypertension, depression presenting for large left facial abscess. Abscess began on 10/27; he went to the dentist and asked to have his tooth pulled. Due to this abscess dentist recommended 10 days of Augmentin. Patient finished course of Augmentin, return to dentist who conferred with oral maxillofacial surgery at [facility]. They recommended he continue with tooth extraction which occurred on 11/4. After extraction patient reports he had drainage from the wound in his mouth and then drainage from external wound beginning on Saturday. He says it is not painful but he does have a burning pain from wiping with paper towels and cleaning solution. He has had cellulitis/infections in the past.      Last A1c 10.4 on 9/24/2021.     Patient denies fevers, chills, pain. No stiff neck. \"    Medications:     Scheduled Meds:   magnesium sulfate  1,000 mg IntraVENous Once    insulin lispro (1 Unit Dial)  15 Units SubCUTAneous TID AC    insulin glargine  33 Units SubCUTAneous BID    insulin lispro  0-12 Units SubCUTAneous TID WC    insulin lispro  0-6 Units SubCUTAneous Nightly    saccharomyces boulardii  250 mg Oral BID    sodium chloride flush  5-40 mL IntraVENous 2 times per day    ampicillin-sulbactam  3,000 mg IntraVENous Q6H    sodium chloride  1,000 mL IntraVENous Once    atorvastatin  40 mg Oral Daily    buprenorphine-naloxone  4 tablet SubLINGual Daily    citalopram  10 mg Oral Daily    gabapentin  600 mg Oral TID    [Held by provider] lisinopril  40 mg Oral Daily    senna  2 tablet Oral Daily    VORTIoxetine HBr  20 mg Oral Daily    sodium chloride flush  5-40 mL IntraVENous 2 times per day    enoxaparin  40 mg SubCUTAneous Daily     Continuous Infusions:   sodium chloride      sodium chloride 100 mL/hr at 11/10/21 2240    dextrose       PRN Meds:sodium chloride flush, sodium chloride, diphenhydrAMINE, sodium chloride flush, sodium chloride, ondansetron **OR** ondansetron, polyethylene glycol, acetaminophen **OR** acetaminophen, glucose, dextrose, glucagon (rDNA), dextrose    Objective:   Vitals:   T-max:  Patient Vitals for the past 8 hrs:   BP Temp Temp src Pulse Resp SpO2   11/12/21 0927 132/74 98 °F (36.7 °C) Oral 72 16 97 %   11/12/21 0400 127/77 97.8 °F (36.6 °C) Oral 64 16 99 %       Intake/Output Summary (Last 24 hours) at 11/12/2021 1042  Last data filed at 11/12/2021 0549  Gross per 24 hour   Intake 1782.45 ml   Output 1050 ml   Net 732.45 ml       Review of Systems  As per  Interval hx    Physical Exam  Constitutional:       General: He is not in acute distress. Appearance: He is obese. HENT:      Head: Normocephalic. Comments: L side facial abscess has been incised and drained -  Drain in place   Eyes:      Extraocular Movements: Extraocular movements intact. Conjunctiva/sclera: Conjunctivae normal.   Cardiovascular:      Rate and Rhythm: Normal rate and regular rhythm. Heart sounds: Normal heart sounds. Pulmonary:      Effort: Pulmonary effort is normal.      Breath sounds: Normal breath sounds. Abdominal:      Palpations: Abdomen is soft. Musculoskeletal:         General: Deformity present. Right lower leg: No edema. Left lower leg: No edema.       Comments: R foot: amputation of all digits; clean based ulcer on sole     L foot: amputation of first and second digits; some ulcerative lesions on last 3 digits    Skin:     Comments: R foot: clean based ulcer on sole    Neurological:      General: No focal deficit present. Mental Status: He is alert and oriented to person, place, and time. Sensory: Sensory deficit present. Comments: LLE - mild sensory deficit   Psychiatric:         Mood and Affect: Mood normal.         Behavior: Behavior normal.         Thought Content: Thought content normal.         Judgment: Judgment normal.         LABS:    CBC:   Recent Labs     11/10/21  0526 11/11/21  0557 11/12/21  0557   WBC 8.7 6.7 6.5   HGB 10.4* 10.2* 9.9*   HCT 31.3* 31.0* 30.4*    419 392   MCV 81.0 80.9 82.4     Renal:    Recent Labs     11/10/21  0525 11/10/21  0526 11/11/21  0557 11/12/21  0557   NA  --  138 138 139   K  --  4.2 4.2 4.1   CL  --  100 104 105   CO2  --  23 23 19*   BUN  --  22* 15 16   CREATININE  --  0.8* 0.6* 0.6*   GLUCOSE  --  252* 231* 232*   CALCIUM  --  8.8 8.6 8.3   MG 2.20  --  2.10 1.90   PHOS 3.8  --  3.6 3.8   ANIONGAP  --  15 11 15     Hepatic:   Recent Labs     11/11/21 0557 11/12/21 0557   LABALBU 3.3* 2.9*     Troponin: No results for input(s): TROPONINI in the last 72 hours. BNP: No results for input(s): BNP in the last 72 hours. Lipids: No results for input(s): CHOL, HDL in the last 72 hours. Invalid input(s): LDLCALCU, TRIGLYCERIDE  ABGs:  No results for input(s): PHART, BGP6VPD, PO2ART, CCQ6JLS, BEART, THGBART, G4BBQNVX, DNO6UAD in the last 72 hours. INR:   Recent Labs     11/09/21  1046   INR 1.09     Lactate: No results for input(s): LACTATE in the last 72 hours. Cultures:  -----------------------------------------------------------------  RAD:   CT SOFT TISSUE NECK W CONTRAST   Final Result      Large ill-defined fluid collection centered along the inferior aspect of the left mandible measuring approximately 8.9 x 3.9 x 4.3 cm.  No evidence of airway compromise. XR CHEST (2 VW)   Final Result      No evidence of acute cardiopulmonary disease. Assessment/Plan:     #sepsis 2/2 L facial abscess  - SIRS + on admission: tachycardic and WBC 11.8 => RESOLVED  - ENT consult: I&D on 11.9.21  - work on BG control: Lantus dose increased to 30 units BID with Lispro HDSSI  - abx: vanc, levoquin => d/c'ed 11.11.21, switch to unasyn (ampicillin-sulbactam)     - Cx Anerobic n Aerobic: Normal oral lucy  Light growth No further workup   No Epithelial Cells seen   2+ WBC's (Polymorphonuclear)   1+ Gram positive cocci   1+ Gram negative rods    - Cx, surgical: Streptococcus anginosus  2+ Gram positive cocci   2+ WBC's (Polymorphonuclear)      ID recs:   - Placed PICC and requested IV abx d/t severity of infections that lead to deep space facial abscesses in the mandible  - abx changed to IV unasyn (ampicillin-sulbactam)  - use ertapenem (invanz) for broad activity and once daily dosing - 1gm IV daily through 12/1 (1st dose in hospital)    #Foot Wounds  - multiple SF abrasion to B/L LE   - podiatry c/s  - recs:   No antibiotics warranted from a podiatric standpoint 2/2 no acute signs of infection to the feet  Prevalon boots ordered. Patient is to wear at all times while in bed to prevent further deep tissue injury. Post-op shoe order to the room     #DM2-last A1c 10.4 on 9/24   at [facility], down to 494 with 10 Regular Insulin. BG goal is < 180   - BG 9:30 pm 11/11: 168; BG  XX AM: 232 (6 AM) => 182 (9AM, before insulin)  - Lantus 35 units BID  - Lispro 15 units 3x with meals   - MDSSI 3x with meals   - nightly correction Lispro also in place   - Repeat A1c: 11.6 (11.9.21)      #Major depressive disorder  Patient on 10 of Celexa and recently started on Trintellix.   Reports taking both at home.  -Continue home meds    #HTN  - BP soft since yesterday  - holding lisinopril     Surgery/ENT Updates: surgery team to continue to back out penrose 2-3 \" BID, will return later this AM to back it out    Code Status: Full Code  FEN: Adult - 3 carb choices   PPX: Lovenox  DISPO: Mary Shea MD, PGY-1  11/12/21  10:42 AM    This patient has been staffed and discussed with Cole Hyde MD.

## 2021-11-12 NOTE — DISCHARGE SUMMARY
INTERNAL MEDICINE DEPARTMENT AT [address] [place] [address]  DISCHARGE SUMMARY    Patient ID: Arin Bateman                                             Discharge Date: 11/13/2021   Patient's PCP: Gwynneth Lean D. Edmund Saint MD, MD                                          Discharge Physician: Lucero James MD MD  Admit Date: 11/8/2021   Admitting Physician: Charli Serrano DO    PROBLEMS DURING HOSPITALIZATION:  Present on Admission:   Facial abscess   Neck abscess      DISCHARGE DIAGNOSES:  1. Left facial-neck abscess    HPI:  \"Andrés Howell is a 35 y.o., male with PMH of poorly controlled diabetes, hypertension, depression presenting for large left facial abscess.       Abscess began on 10/27; he went to the dentist and asked to have his tooth pulled.  Due to this abscess dentist recommended 10 days of Augmentin.  Patient finished course of Augmentin, return to dentist who conferred with oral maxillofacial surgery at [facility]. They recommended he continue with tooth extraction which occurred on 11/4.       After extraction patient reports he had drainage from the wound in his mouth and then drainage from external wound beginning on Saturday. Lyle Susanne says it is not painful but he does have a burning pain from wiping with paper towels and cleaning solution.       He has had cellulitis/infections in the past. Last A1c 10.4 on 9/24/2021. Patient denies fevers, chills, pain.  No stiff neck. \"    The following issues were addressed during hospitalization:     #Sepsis 2/2 L facial abscess  - ENT was consulted. Had I&D on 11.9.21. Started on Unasyn. Cx: Streptococcus anginosus. ID consult and recommended IV ertapenem on d/c. #Foot Wounds  - podiatry c/s  - recs: no antibiotics warranted from a podiatric standpoint 2/2 no acute signs of infection to the feet. Prevalon boots ordered. Patient is to wear at all times while in bed to prevent further deep tissue injury.       #DM2-last A1c 10.4 on 9/2  - Repeat A1c: 11.6 (11.9.21)   - blood glucose Dial) 100 UNIT/ML Sopn  Inject 15 Units into the skin 3 times daily (before meals)  Replaces: insulin lispro 100 UNIT/ML injection vial     saccharomyces boulardii 250 MG capsule  Commonly known as: FLORASTOR  Take 1 capsule by mouth 2 times daily for 7 days        CHANGE how you take these medications    insulin glargine 100 UNIT/ML injection pen  Commonly known as: LANTUS;BASAGLAR  Inject 35 Units into the skin 2 times daily  What changed:   · how much to take  · when to take this     Trintellix 20 MG Tabs tablet  Generic drug: VORTIoxetine HBr  What changed: Another medication with the same name was removed. Continue taking this medication, and follow the directions you see here.         CONTINUE taking these medications    atorvastatin 40 MG tablet  Commonly known as: LIPITOR     citalopram 10 MG tablet  Commonly known as: CELEXA     empagliflozin 10 MG tablet  Commonly known as: JARDIANCE     gabapentin 600 MG tablet  Commonly known as: NEURONTIN     hydroCHLOROthiazide 12.5 MG capsule  Commonly known as: MICROZIDE     lisinopril 40 MG tablet  Commonly known as: PRINIVIL;ZESTRIL     Suboxone 8-2 MG Subl SL tablet  Generic drug: buprenorphine-naloxone     vitamin D 1.25 MG (10956 UT) Caps capsule  Commonly known as: ERGOCALCIFEROL     Vitamin D3 50 MCG (2000 UT) Caps        STOP taking these medications    insulin lispro 100 UNIT/ML injection vial  Commonly known as: HUMALOG  Replaced by: insulin lispro (1 Unit Dial) 100 UNIT/ML Sopn     naproxen 500 MG tablet  Commonly known as: NAPROSYN     senna 8.6 MG tablet  Commonly known as: SENOKOT           Where to Get Your Medications      You can get these medications from any pharmacy    Bring a paper prescription for each of these medications  · insulin glargine 100 UNIT/ML injection pen  · insulin lispro (1 Unit Dial) 100 UNIT/ML Sopn  · saccharomyces boulardii 250 MG capsule       Activity: activity as tolerated  Diet: diabetic diet  Wound Care: keep wound clean and dry    Time Spent on discharge is more than 30 minutes    Signed:  Tiffani Escobedo MD, PGY-1  Internal Medicine Resident  11/13/2021

## 2021-11-12 NOTE — PROGRESS NOTES
Podiatric Surgery Daily Progress Note      Admit Date: 11/8/2021      Code:Full Code    Patient seen and examined, labs and records reviewed    Subjective:     Patient seen at bedside this AM.  Patient denies any overnight acute event. Patient denies f/c/n/v/sob/cp. Review of Systems: A 12 point review of symptoms is unremarkable with the exception of the chief complaint. Patient specifically denies nausea, fever, vomiting, chills, shortness of breath, chest pain, abdominal pain, constipation or difficulty urinating. Objective     /77   Pulse 64   Temp 97.8 °F (36.6 °C) (Oral)   Resp 16   Ht 6' 2\" (1.88 m)   Wt 299 lb (135.6 kg)   SpO2 99%   BMI 38.39 kg/m²      I/O:    Intake/Output Summary (Last 24 hours) at 11/12/2021 0903  Last data filed at 11/12/2021 0549  Gross per 24 hour   Intake 1902.45 ml   Output 1350 ml   Net 552.45 ml              Wt Readings from Last 3 Encounters:   11/08/21 299 lb (135.6 kg)   09/24/19 (!) 345 lb (156.5 kg)   02/18/18 (!) 325 lb (147.4 kg)       LABS:    Recent Labs     11/11/21  0557 11/12/21  0557   WBC 6.7 6.5   HGB 10.2* 9.9*   HCT 31.0* 30.4*    392        Recent Labs     11/12/21  0557      K 4.1      CO2 19*   PHOS 3.8   BUN 16   CREATININE 0.6*        Recent Labs     11/09/21  1046   INR 1.09       LOWER EXTREMITY EXAMINATION    Dressing to bilateral LE intact. No strikethrough noted to the external dressing. No drainage noted to the internal layers of the dressing. VASCULAR: DP and PT pulses are non-palpable 0/4 b/l, upon hand-held Doppler examination the DP and PT pulses were noted to be biphasic bilaterally. CFT is brisk to the TMA stump on the right and remaining digits to the left. Skin temperature is warm to cool from proximal to distal with no focal calor noted. No edema noted. No pain with calf compression b/l.     NEUROLOGIC: Gross and epicritic sensation is diminished b/l.  Protective sensation is diminished at all pedal sites b/l. DERMATOLOGIC:  Patient provided verbal consent for photos to be obtained today, 11/12/2021    Right lower extremity:          Multiple superficial abrasions noted to the distal TMA stump. The abrasion base is pink dermal tissue. The wound does not probe. No purulent drainage, tracking, tunneling, crepitus, fluctuance or acute signs of infection noted. Multiple superficial thickness ulcerations noted to the plantar lateral aspect of the right lower extremity. The wound base is granular in nature. The wound does not probe. No purulent drainage, tracking, tunneling, crepitus, fluctuance, or acute signs of infection noted. Left lower extremity:      Multiple superficial abrasions noted to digits 3, 4, and 5. Wound base is granular in nature. The wound does not probe. No purulent drainage, tracking, tunneling, crepitus, fluctuance, or acute signs of infection noted.     Pink discoloration noted to the anterior ankle of the left lower extremity. MUSCULOSKELETAL: Muscle strength is 5/5 for all pedal groups tested. No pain with palpation of the foot or ankle b/l. Ankle joint ROM is decreased in dorsiflexion with the knee extended. No obvious biomechanical abnormalities. Transmetatarsal amputation noted to right LE, and partial first digit and second digit amputation to the left lower extremity.           ASSESSMENT/PLAN  -Superficial thickness ulceration; right lower extremity- [place] stage I  -Multiple superficial abrasions; bilateral lower extremity  -Pressure injury; left anterior ankle-NPUAP Stage 1  -Diabetes mellitus with peripheral neuropathy  -History of amputations; bilateral lower extremity      -Patient seen and examined at bedside this a.m.  -VSS, No leukocytosis noted (WBC 6.5)  -Continue IV antibiotics per primary team for facial abscess  -No antibiotics warranted from a podiatric standpoint 2/2 no acute signs of infection to the feet  -Biateral lower extremity dressed with saline soaked gauze, DSD, Ace  -Prevalon boots ordered. Patient is to wear at all times while in bed to prevent further deep tissue injury.  -Post-op shoe order to the room  -Weightbearing as tolerated to bilateral lower extremity in postop shoe or normal shoe gear      DISPO: Stable superficial abrasions to bilateral lower extremity. Labs and imaging not ordered due to no acute signs of infection to bilateral lower extremities. Wounds are stable at this time. Patient will follow up with his podiatrist Dr. Sweta Souza DPM after discharge. Nursing communication placed for daily dressing changes. We will sign off on this patient at this time.     Patient assessment and plan was discussed with Dr. Krupa King DPM.    Marilou Shoemaker DPM   Podiatric Resident PGY1  Pager [phone] or [employer]  11/12/2021, 9:03 AM

## 2021-11-12 NOTE — PROGRESS NOTES
Pt arrived from 5S to 3S room 3309 he is alert and oriented x 4   Skin assessment completed  Call light and belonging within reach  Safety precautions in place  Pt oriented to unit and updated on care plan/status

## 2021-11-12 NOTE — PROGRESS NOTES
Surgery Daily Progress Note  Adelita Alter  CC:  Left facial abscess      Subjective :No acute events overrnight. Patient remains afebrile and HDS. States that less neck pain. Decrease in drainage      Objective    Infusions:   sodium chloride      sodium chloride 100 mL/hr at 11/10/21 2240    dextrose          I/O:I/O last 3 completed shifts: In: 1902.5 [P.O.:720; I.V.:150.4; IV Piggyback:1032]  Out: 2250 [Urine:2250]           Wt Readings from Last 1 Encounters:   11/08/21 299 lb (135.6 kg)                 LABS:    Recent Labs     11/11/21  0557 11/12/21  0557   WBC 6.7 6.5   HGB 10.2* 9.9*   HCT 31.0* 30.4*   MCV 80.9 82.4    392        Recent Labs     11/10/21  0525 11/10/21  0526 11/11/21  0557   NA  --  138 138   K  --  4.2 4.2   CL  --  100 104   CO2  --  23 23   PHOS 3.8  --  3.6   BUN  --  22* 15   CREATININE  --  0.8* 0.6*        No results for input(s): AST, ALT, ALB, BILIDIR, BILITOT, ALKPHOS in the last 72 hours. No results for input(s): LIPASE, AMYLASE in the last 72 hours. Recent Labs     11/09/21  1046   INR 1.09      No results for input(s): CKTOTAL, CKMB, CKMBINDEX, TROPONINI in the last 72 hours. Exam:/77   Pulse 64   Temp 97.8 °F (36.6 °C) (Oral)   Resp 16   Ht 6' 2\" (1.88 m)   Wt 299 lb (135.6 kg)   SpO2 99%   BMI 38.39 kg/m²   General appearance: alert, appears stated age and cooperative  HEENT: Incision with minimal swelling, penrose in place with some seropurulent drainage.    Chest/Lungs: normal effort, symmetric chest rise, on RA  Cardiovascular: RRR  Abdomen: soft, non-tender, non-distended  Extremities: no edema, no cyanosis  Neuro: A&Ox3, no focal deficits, sensation intact    ASSESSMENT/PLAN: Pt. is a 35 y.o. male s/p  left facial abscess incision and drainage     - continue po pain medication  - continue carb control diet  - continue strict glucose control  - continue to change outer dressing prn, will discuss starting to back out penrose drain

## 2021-11-12 NOTE — PROGRESS NOTES
ID Follow-up NOTE    CC:   L facial / neck abscess. Odontogenic infection  Antibiotics: Unasyn    Admit Date: 2021  Hospital Day: 5    Subjective:     Had PICC placed   Patient feels good, minimal pain L upper neck with palpation  No other complaint     Objective:     Patient Vitals for the past 8 hrs:   BP Temp Temp src Pulse Resp SpO2   21 0400 127/77 97.8 °F (36.6 °C) Oral 64 16 99 %     I/O last 3 completed shifts: In: 1902.5 [P.O.:720; I.V.:150.4; IV Piggyback:1032]  Out: 2250 [Urine:2250]  No intake/output data recorded. EXAM:  GENERAL: No apparent distress.     HEENT: Membranes moist, no oral lesion - extraction site with no swelling / tenderness  Swelling over mandible / jaw line with upper neck wound (has penrose), skin with bread, no redness, no induration  NECK:  Supple, no lymphadenopathy  LUNGS: Clear b/l, no rales, no dullness  CARDIAC: RRR, no murmur appreciated  ABD:  + BS, soft / NT  EXT:  No rash, no edema, no lesions  NEURO: No focal neurologic findings  PSYCH: Orientation, sensorium, mood normal  LINES:  R PICC, placed        Data Review:  Lab Results   Component Value Date    WBC 6.5 2021    HGB 9.9 (L) 2021    HCT 30.4 (L) 2021    MCV 82.4 2021     2021     Lab Results   Component Value Date    CREATININE 0.6 (L) 2021    BUN 16 2021     2021    K 4.1 2021     2021    CO2 19 (L) 2021       Hepatic Function Panel:   Lab Results   Component Value Date    ALKPHOS 96 2021    ALT 10 2021    AST 8 2021    PROT 7.9 2021    BILITOT 0.3 2021    LABALBU 2.9 2021       Micro:   Neck GS 2+WBC, 2+GPC; cult - Streptococcus anginosus      Surg cult: GS 2+WBC, 1+GPC, 1+GNR; cult normal oral lucy     Imagin/8 Neck soft tissue CT  Large ill-defined fluid collection centered along the inferior aspect of the left mandible measuring approximately 8.9 x 3.9 x 4. 3 cm. No evidence of airway compromise     11/8 CXR  'No evidence of acute cardiopulmonary disease. Scheduled Meds:   insulin lispro (1 Unit Dial)  15 Units SubCUTAneous TID AC    insulin glargine  33 Units SubCUTAneous BID    insulin lispro  0-12 Units SubCUTAneous TID WC    insulin lispro  0-6 Units SubCUTAneous Nightly    saccharomyces boulardii  250 mg Oral BID    sodium chloride flush  5-40 mL IntraVENous 2 times per day    ampicillin-sulbactam  3,000 mg IntraVENous Q6H    sodium chloride  1,000 mL IntraVENous Once    atorvastatin  40 mg Oral Daily    buprenorphine-naloxone  4 tablet SubLINGual Daily    citalopram  10 mg Oral Daily    gabapentin  600 mg Oral TID    [Held by provider] lisinopril  40 mg Oral Daily    senna  2 tablet Oral Daily    VORTIoxetine HBr  20 mg Oral Daily    sodium chloride flush  5-40 mL IntraVENous 2 times per day    enoxaparin  40 mg SubCUTAneous Daily       Continuous Infusions:   sodium chloride      sodium chloride 100 mL/hr at 11/10/21 2240    dextrose         PRN Meds:  sodium chloride flush, sodium chloride, diphenhydrAMINE, sodium chloride flush, sodium chloride, ondansetron **OR** ondansetron, polyethylene glycol, acetaminophen **OR** acetaminophen, glucose, dextrose, glucagon (rDNA), dextrose      Assessment:     Hx DM, obesity (BMI 38), HTN, HL, OA  Hx DM foot infection, multiple toe amputations     Dental infection, s/p L lower premolar extraction  L facial abscess   - cult oral lucy   - s/p drainage   - appears decompressed    - reviewed CT - no mandible destruction  Leukocytosis, resolved     I am concerned about the severity of infection that lead to facial, deep space abscess from mandible  Favor placing PICC and giving iv  antibiotics    Plan:     Cont unasyn in hosp  Postop care per ENT     See MUNIRA -will use ertapenem for broad activity and once daily dosing     Medical Decision Making:   The following items were considered in medical decision making:  Discussion of patient care with other providers  Reviewed clinical lab tests  Reviewed radiology tests  Reviewed other diagnostic tests/interventions  Independent review of radiologic images - reviewed CT on 11/11 with Dr Lynn Gonsalves  Microbiology cultures and other micro tests reviewed      Discussed with pt  Phylicia Garcia MD     INFUSION ORDERS:  Invanz 1 gm iv daily through 11/24  - Diagnosis - Dental infection, Facial abscess  - First dose given in hospital  - PICC   - Disposition / date discharge  - Check CBC w diff, CMP, ESR, CRP every Mon or Tue - FAX result to [phone]  - Call with antibiotic / infusion issues, [phone]  - No f/u in outpatient ID office necessary

## 2021-11-12 NOTE — CARE COORDINATION
SW Following, Pt will need HHC and Home Infusions @ [facility]. SW spoke w/Val[facility] and [facility] is following for HHC. Sw also spoke w/Lilly and [facility] is following for Home ABX. SW is waiting on a call from a [facility] for verbal orders for home ABX. Electronically signed by LISANDRA Mac LSW on 11/12/2021 at 10:04 AM   [phone]      [facility] called and received verbal orders for Pt's IV ABX.     Electronically signed by LISANDRA Mac LSW on 11/12/2021 at 1:39 PM

## 2021-11-13 VITALS
OXYGEN SATURATION: 95 % | WEIGHT: 299 LBS | RESPIRATION RATE: 14 BRPM | TEMPERATURE: 99.3 F | HEIGHT: 74 IN | BODY MASS INDEX: 38.37 KG/M2 | DIASTOLIC BLOOD PRESSURE: 67 MMHG | HEART RATE: 83 BPM | SYSTOLIC BLOOD PRESSURE: 138 MMHG

## 2021-11-13 LAB
ALBUMIN SERPL-MCNC: 3.3 G/DL (ref 3.4–5)
ANION GAP SERPL CALCULATED.3IONS-SCNC: 11 MMOL/L (ref 3–16)
BASOPHILS ABSOLUTE: 0.1 K/UL (ref 0–0.2)
BASOPHILS RELATIVE PERCENT: 1 %
BUN BLDV-MCNC: 19 MG/DL (ref 7–20)
CALCIUM SERPL-MCNC: 8.6 MG/DL (ref 8.3–10.6)
CHLORIDE BLD-SCNC: 104 MMOL/L (ref 99–110)
CO2: 23 MMOL/L (ref 21–32)
CREAT SERPL-MCNC: 0.6 MG/DL (ref 0.9–1.3)
EOSINOPHILS ABSOLUTE: 0.2 K/UL (ref 0–0.6)
EOSINOPHILS RELATIVE PERCENT: 2.9 %
GFR AFRICAN AMERICAN: >60
GFR NON-AFRICAN AMERICAN: >60
GLUCOSE BLD-MCNC: 155 MG/DL (ref 70–99)
GLUCOSE BLD-MCNC: 197 MG/DL (ref 70–99)
GLUCOSE BLD-MCNC: 199 MG/DL (ref 70–99)
HCT VFR BLD CALC: 30.6 % (ref 40.5–52.5)
HEMOGLOBIN: 10 G/DL (ref 13.5–17.5)
LYMPHOCYTES ABSOLUTE: 2.4 K/UL (ref 1–5.1)
LYMPHOCYTES RELATIVE PERCENT: 30.9 %
MAGNESIUM: 1.9 MG/DL (ref 1.8–2.4)
MCH RBC QN AUTO: 27 PG (ref 26–34)
MCHC RBC AUTO-ENTMCNC: 32.8 G/DL (ref 31–36)
MCV RBC AUTO: 82.4 FL (ref 80–100)
MONOCYTES ABSOLUTE: 0.5 K/UL (ref 0–1.3)
MONOCYTES RELATIVE PERCENT: 6.6 %
NEUTROPHILS ABSOLUTE: 4.6 K/UL (ref 1.7–7.7)
NEUTROPHILS RELATIVE PERCENT: 58.6 %
PDW BLD-RTO: 14.5 % (ref 12.4–15.4)
PERFORMED ON: ABNORMAL
PERFORMED ON: ABNORMAL
PHOSPHORUS: 4.1 MG/DL (ref 2.5–4.9)
PLATELET # BLD: 439 K/UL (ref 135–450)
PMV BLD AUTO: 6.9 FL (ref 5–10.5)
POTASSIUM SERPL-SCNC: 4.1 MMOL/L (ref 3.5–5.1)
RBC # BLD: 3.71 M/UL (ref 4.2–5.9)
SODIUM BLD-SCNC: 138 MMOL/L (ref 136–145)
WBC # BLD: 7.8 K/UL (ref 4–11)

## 2021-11-13 PROCEDURE — 80069 RENAL FUNCTION PANEL: CPT

## 2021-11-13 PROCEDURE — 2580000003 HC RX 258: Performed by: INTERNAL MEDICINE

## 2021-11-13 PROCEDURE — 83735 ASSAY OF MAGNESIUM: CPT

## 2021-11-13 PROCEDURE — 85025 COMPLETE CBC W/AUTO DIFF WBC: CPT

## 2021-11-13 PROCEDURE — 36415 COLL VENOUS BLD VENIPUNCTURE: CPT

## 2021-11-13 PROCEDURE — 6370000000 HC RX 637 (ALT 250 FOR IP): Performed by: INTERNAL MEDICINE

## 2021-11-13 PROCEDURE — 6360000002 HC RX W HCPCS: Performed by: STUDENT IN AN ORGANIZED HEALTH CARE EDUCATION/TRAINING PROGRAM

## 2021-11-13 PROCEDURE — 2580000003 HC RX 258: Performed by: STUDENT IN AN ORGANIZED HEALTH CARE EDUCATION/TRAINING PROGRAM

## 2021-11-13 PROCEDURE — 6360000002 HC RX W HCPCS: Performed by: INTERNAL MEDICINE

## 2021-11-13 PROCEDURE — 6370000000 HC RX 637 (ALT 250 FOR IP): Performed by: STUDENT IN AN ORGANIZED HEALTH CARE EDUCATION/TRAINING PROGRAM

## 2021-11-13 RX ORDER — INSULIN LISPRO 100 [IU]/ML
15 INJECTION, SOLUTION INTRAVENOUS; SUBCUTANEOUS
Qty: 1 PEN | Refills: 3 | Status: ON HOLD | OUTPATIENT
Start: 2021-11-13 | End: 2022-02-17 | Stop reason: SDUPTHER

## 2021-11-13 RX ORDER — SACCHAROMYCES BOULARDII 250 MG
250 CAPSULE ORAL 2 TIMES DAILY
Qty: 14 CAPSULE | Refills: 0 | Status: SHIPPED | OUTPATIENT
Start: 2021-11-13 | End: 2021-11-20

## 2021-11-13 RX ADMIN — SODIUM CHLORIDE, PRESERVATIVE FREE 10 ML: 5 INJECTION INTRAVENOUS at 09:00

## 2021-11-13 RX ADMIN — ONDANSETRON 4 MG: 4 TABLET, ORALLY DISINTEGRATING ORAL at 08:16

## 2021-11-13 RX ADMIN — GABAPENTIN 600 MG: 600 TABLET, FILM COATED ORAL at 08:19

## 2021-11-13 RX ADMIN — STANDARDIZED SENNA CONCENTRATE 17.2 MG: 8.6 TABLET ORAL at 08:17

## 2021-11-13 RX ADMIN — INSULIN LISPRO 2 UNITS: 100 INJECTION, SOLUTION INTRAVENOUS; SUBCUTANEOUS at 08:36

## 2021-11-13 RX ADMIN — BUPRENORPHINE AND NALOXONE 4 TABLET: 2; .5 TABLET SUBLINGUAL at 08:17

## 2021-11-13 RX ADMIN — CITALOPRAM HYDROBROMIDE 10 MG: 10 TABLET ORAL at 08:23

## 2021-11-13 RX ADMIN — VORTIOXETINE 20 MG: 10 TABLET, FILM COATED ORAL at 08:20

## 2021-11-13 RX ADMIN — INSULIN LISPRO 15 UNITS: 100 INJECTION, SOLUTION INTRAVENOUS; SUBCUTANEOUS at 08:33

## 2021-11-13 RX ADMIN — MAGNESIUM SULFATE HEPTAHYDRATE 1000 MG: 500 INJECTION, SOLUTION INTRAMUSCULAR; INTRAVENOUS at 09:15

## 2021-11-13 RX ADMIN — AMPICILLIN SODIUM AND SULBACTAM SODIUM 3000 MG: 2; 1 INJECTION, POWDER, FOR SOLUTION INTRAMUSCULAR; INTRAVENOUS at 08:20

## 2021-11-13 RX ADMIN — ATORVASTATIN CALCIUM 40 MG: 40 TABLET, FILM COATED ORAL at 08:20

## 2021-11-13 RX ADMIN — AMPICILLIN SODIUM AND SULBACTAM SODIUM 3000 MG: 2; 1 INJECTION, POWDER, FOR SOLUTION INTRAMUSCULAR; INTRAVENOUS at 03:00

## 2021-11-13 RX ADMIN — ENOXAPARIN SODIUM 40 MG: 100 INJECTION SUBCUTANEOUS at 08:20

## 2021-11-13 RX ADMIN — Medication 250 MG: at 08:19

## 2021-11-13 RX ADMIN — SODIUM CHLORIDE, PRESERVATIVE FREE 10 ML: 5 INJECTION INTRAVENOUS at 08:23

## 2021-11-13 RX ADMIN — ERTAPENEM 1000 MG: 1 INJECTION INTRAMUSCULAR; INTRAVENOUS at 13:01

## 2021-11-13 ASSESSMENT — PAIN SCALES - GENERAL
PAINLEVEL_OUTOF10: 0
PAINLEVEL_OUTOF10: 0

## 2021-11-13 NOTE — PROGRESS NOTES
Pt transfer from 3S around 2000. Pt alert and oriented. VSS. Pt denies pain. Pt legs are wrapped in ace wrap CDI. Pt voiding freely. Pt tolerating diet. Pt sleeping for intervals. Pt has call light within reach, bed in lowest position with wheels locked, 2/4 side rails up, and pt is up ad jennifer with steady gait. Will continue to monitor.

## 2021-11-13 NOTE — PROGRESS NOTES
When rounding on pt, it was noticed that his pin jr drain had been accidently removed. Surgical residents notified. Will place gauze over site and they will check on it during morning rounds.

## 2021-11-13 NOTE — CARE COORDINATION
Case Management Assessment            Discharge Note                    Date / Time of Note: 11/13/2021 10:29 AM                  Discharge Note Completed by: LISANDRA Vallecillo, JENA    Patient Name: Celestina Wolfe   YOB: 1980  Diagnosis: Hyperkalemia [E87.5]  Facial abscess [L02.01]  Hyperglycemia without ketosis [R73.9]   Date / Time: 11/8/2021  5:21 PM    Current PCP: Lisa Moran MD, MD  Clinic patient: No    Hospitalization in the last 30 days: No    Advance Directives:  Code Status: Full Code  [place] DNR form completed and on chart: No    Financial:  Payor: Magui Marrufo / Plan: [employer] / Product Type: *No Product type* /      Pharmacy:    [id number] [address] [address]  Phone: [phone] Fax: [phone]      Assistance purchasing medications?:    Assistance provided by Case Management: None at this time    Does patient want to participate in local refill/ meds to beds program?:      Meds To Beds General Rules:  1. Can ONLY be done Monday- Friday between 8:30am-5pm  2. Prescription(s) must be in pharmacy by 3pm to be filled same day  3. Copy of patient's insurance/ prescription drug card and patient face sheet must be sent along with the prescription(s)  4. Cost of Rx cannot be added to hospital bill. If financial assistance is needed, please contact unit  or ;  or  CANNOT provide pharmacy voucher for patients co-pays  5.  Patients can then  the prescription on their way out of the hospital at discharge, or pharmacy can deliver to the bedside if staff is available. (payment due at time of pick-up or delivery - cash, check, or card accepted)     Able to afford home medications/ co-pay costs: Yes    ADLS:  Current PT AM-PAC Score:   /24  Current OT AM-PAC Score:   /24      DISCHARGE Disposition: Home with Home

## 2021-11-13 NOTE — PROGRESS NOTES
0- report received from [facility] 50     0800- pt sitting up in bedside, c/o nausea, medicated per order. Pt wearing Prevlon boots and is on specialty bed extender. 1045- Message left for aurora CARUSO about home care needs consult and marco antonio served dr. Laurent Jenkins about invanz order- new order received from pharmacy to dose. Once written and administered pt may discharge to home. 1125- pt requests to be seen by surgical resident prior to discharge, marco antonio served dr. Raúl Pulido with request.    [address] discharge planning completed, [facility] faxed invanz dose paperwork. Pt watiing on mother to arrive for discharge to home. Discharge instructions and prescriptions provided to pt.    1358- pt discharged to home via wheelchair. Left brachial PICC in place, line flushed prior to discharge and sterile cap placed.

## 2021-11-13 NOTE — PROGRESS NOTES
Surgery Daily Progress Note  Delories Comp  CC:  Left facial abscess      Subjective :  No acute events overnight, still with serosanguinous drainage out of the neck. Penrose fell out overnight and wound covered with 4x4. Objective    Infusions:   sodium chloride      sodium chloride 100 mL/hr at 11/10/21 2240    dextrose          I/O:I/O last 3 completed shifts: In: 2502.5 [P.O.:1320; I.V.:150.4; IV Piggyback:1032]  Out: 650 [Urine:650]           Wt Readings from Last 1 Encounters:   11/08/21 299 lb (135.6 kg)                 LABS:    Recent Labs     11/12/21  0557 11/13/21  0511   WBC 6.5 7.8   HGB 9.9* 10.0*   HCT 30.4* 30.6*   MCV 82.4 82.4    439        Recent Labs     11/12/21  0557 11/13/21  0511    138   K 4.1 4.1    104   CO2 19* 23   PHOS 3.8 4.1   BUN 16 19   CREATININE 0.6* 0.6*        No results for input(s): AST, ALT, ALB, BILIDIR, BILITOT, ALKPHOS in the last 72 hours. No results for input(s): LIPASE, AMYLASE in the last 72 hours. No results for input(s): PROT, INR, APTT in the last 72 hours. No results for input(s): CKTOTAL, CKMB, CKMBINDEX, TROPONINI in the last 72 hours.             Exam:/64   Pulse 72   Temp 98.2 °F (36.8 °C) (Oral)   Resp 16   Ht 6' 2\" (1.88 m)   Wt 299 lb (135.6 kg)   SpO2 94%   BMI 38.39 kg/m²   General appearance: alert, appears stated age and cooperative  HEENT: Incision with still minimal edema and erythema  Chest/Lungs: normal effort, symmetric chest rise, on RA  Cardiovascular: RRR  Abdomen: soft, non-tender, non-distended  Extremities: no edema, no cyanosis  Neuro: A&Ox3, no focal deficits, sensation intact    ASSESSMENT/PLAN: Pt. is a 35 y.o. male s/p  left facial abscess incision and drainage     - continue po pain medication  - continue carb control diet  - continue strict glucose control  - continue to change outer dressing prn  - abx per ID   -okay to d/c home today      Tamra Ralph MD  PGY1, General Surgery  11/13/21  6:48 AM

## 2021-11-18 ENCOUNTER — OFFICE VISIT (OUTPATIENT)
Dept: ENT CLINIC | Age: 41
End: 2021-11-18

## 2021-11-18 VITALS
WEIGHT: 311 LBS | BODY MASS INDEX: 39.91 KG/M2 | HEART RATE: 67 BPM | HEIGHT: 74 IN | DIASTOLIC BLOOD PRESSURE: 68 MMHG | SYSTOLIC BLOOD PRESSURE: 106 MMHG | TEMPERATURE: 97.6 F

## 2021-11-18 DIAGNOSIS — Z48.89 POSTOPERATIVE VISIT: Primary | ICD-10-CM

## 2021-11-18 PROCEDURE — 99024 POSTOP FOLLOW-UP VISIT: CPT | Performed by: OTOLARYNGOLOGY

## 2021-11-18 NOTE — PROGRESS NOTES
S/P I and D neck abscess. Doing better. Less trismus. Swelling much imrpoved. PE: Much improved swelling. No overlying erythema. NO evidence of fluctuance. A/P: Follow up  In two weeks.

## 2021-11-19 ENCOUNTER — TELEPHONE (OUTPATIENT)
Dept: INFECTIOUS DISEASES | Age: 41
End: 2021-11-19

## 2021-11-19 NOTE — TELEPHONE ENCOUNTER
Has questions re: lab results done 11/17/2021 and duration of IV ABX. Maliala Jannie says that CathFlo was not needed and line is working now. He has enough ABX to last thru weekend, but will be needing more soon. He might be a bit low on supply due to a past issue with starting IV.

## 2021-11-23 ENCOUNTER — TELEPHONE (OUTPATIENT)
Dept: INFECTIOUS DISEASES | Age: 41
End: 2021-11-23

## 2021-11-23 NOTE — TELEPHONE ENCOUNTER
Spoke with nurse at facility regarding labs to be drawn. Stated that labs would be drawn today by [address]. Advised patient due to end 11/24 and will advise after MD reviews.   Thank you

## 2021-11-24 ENCOUNTER — TELEPHONE (OUTPATIENT)
Dept: INFECTIOUS DISEASES | Age: 41
End: 2021-11-24

## 2021-11-24 NOTE — TELEPHONE ENCOUNTER
Has been noted to be mildly hyponatremic in the past  Sodium ED low at 121  Has been taking diuretic which could contribute  Hold diuretic, IV saline overnight and monitor sodium   Spoke with Gillian Avila at facility and advised of MD note to extend til 12/1. Verbalized understanding. Spoke with Christophe Bower at [facility] and advised of MD note to extend til 12/1.   Verbalized understanding

## 2021-11-29 ENCOUNTER — OFFICE VISIT (OUTPATIENT)
Dept: ENT CLINIC | Age: 41
End: 2021-11-29
Payer: COMMERCIAL

## 2021-11-29 VITALS
BODY MASS INDEX: 40.43 KG/M2 | HEIGHT: 74 IN | SYSTOLIC BLOOD PRESSURE: 110 MMHG | WEIGHT: 315 LBS | DIASTOLIC BLOOD PRESSURE: 77 MMHG | HEART RATE: 102 BPM | TEMPERATURE: 98.1 F

## 2021-11-29 DIAGNOSIS — L02.11 NECK ABSCESS: Primary | ICD-10-CM

## 2021-11-29 PROCEDURE — 76536 US EXAM OF HEAD AND NECK: CPT | Performed by: OTOLARYNGOLOGY

## 2021-11-29 PROCEDURE — G8484 FLU IMMUNIZE NO ADMIN: HCPCS | Performed by: OTOLARYNGOLOGY

## 2021-11-29 PROCEDURE — 1036F TOBACCO NON-USER: CPT | Performed by: OTOLARYNGOLOGY

## 2021-11-29 PROCEDURE — 1111F DSCHRG MED/CURRENT MED MERGE: CPT | Performed by: OTOLARYNGOLOGY

## 2021-11-29 PROCEDURE — G8427 DOCREV CUR MEDS BY ELIG CLIN: HCPCS | Performed by: OTOLARYNGOLOGY

## 2021-11-29 PROCEDURE — G8417 CALC BMI ABV UP PARAM F/U: HCPCS | Performed by: OTOLARYNGOLOGY

## 2021-11-29 PROCEDURE — 99024 POSTOP FOLLOW-UP VISIT: CPT | Performed by: OTOLARYNGOLOGY

## 2021-11-29 ASSESSMENT — ENCOUNTER SYMPTOMS
SORE THROAT: 0
TROUBLE SWALLOWING: 0
RHINORRHEA: 0
EYE PAIN: 0
SINUS PAIN: 0
COUGH: 0
VOICE CHANGE: 0
SINUS PRESSURE: 0
DIARRHEA: 0
NAUSEA: 0
EYE REDNESS: 0
SHORTNESS OF BREATH: 0
EYE ITCHING: 0
FACIAL SWELLING: 0
CHOKING: 0

## 2021-11-29 NOTE — PROGRESS NOTES
Subjective:      Patient ID: Shawn Muñiz is a 35 y.o. male. HPI  Chief Complaint   Patient presents with    Follow up I and D neck abscess       History of Present Illness  Head/Neck    Mikie Shaffer is a(n) 35 y.o. male who presents with a 3 week follow up I and D of large left neck abscess. Here for follow up. Feeling better. Tightness improved. No drainage. Seeing ID later this week. Patient Active Problem List   Diagnosis    Gangrene of toe of left foot (HCC)    Type 2 diabetes mellitus ([address]    Essential hypertension    Obesity, Class III, BMI 40-49.9 (morbid obesity) ([address]    Diabetic foot infection ([address]    Toe osteomyelitis, left (HCC)    Toe necrosis (HCC)    Diabetic polyneuropathy ([address]    Facial abscess    Neck abscess     Past Surgical History:   Procedure Laterality Date    FOOT SURGERY Left 02/19/2018     INCISION AND DRAINAGE WITH HALLUX AMPUTATION LEFT FOOT    KNEE SURGERY Right 6/19/2013    NECK SURGERY Left 11/9/2021    LEFT FACIAL ABSCESS INCISION AND DRAINAGE performed by Giovana Romero MD at [facility] Left     2nd toe    TOE AMPUTATION Right 9/26/2019    RIGHT HALLUX AMPUTATION WITH POSSIBLE 1ST RAY AMPUTATION performed by Reji Gross DPM at [address] OR     No family history on file.   Social History     Socioeconomic History    Marital status: Single     Spouse name: Not on file    Number of children: Not on file    Years of education: Not on file    Highest education level: Not on file   Occupational History    Not on file   Tobacco Use    Smoking status: Never Smoker    Smokeless tobacco: Never Used   Vaping Use    Vaping Use: Never used   Substance and Sexual Activity    Alcohol use: No    Drug use: No     Types: Opiates      Comment: in recovery    Sexual activity: Never   Other Topics Concern    Not on file   Social History Narrative    Not on file     Social Determinants of Health     Financial Resource Strain:     Difficulty of Paying Living Expenses: Not on file   Food Insecurity:     Worried About Running Out of Food in the Last Year: Not on file    Ran Out of Food in the Last Year: Not on file   Transportation Needs:     Lack of Transportation (Medical): Not on file    Lack of Transportation (Non-Medical): Not on file   Physical Activity:     Days of Exercise per Week: Not on file    Minutes of Exercise per Session: Not on file   Stress:     Feeling of Stress : Not on file   Social Connections:     Frequency of Communication with Friends and Family: Not on file    Frequency of Social Gatherings with Friends and Family: Not on file    Attends Restorationist Services: Not on file    Active Member of [address] [place] [address] [employer] or Organizations: Not on file    Attends Club or Organization Meetings: Not on file    Marital Status: Not on file   Intimate Partner Violence:     Fear of Current or Ex-Partner: Not on file    Emotionally Abused: Not on file    Physically Abused: Not on file    Sexually Abused: Not on file   Housing Stability:     Unable to Pay for Housing in the Last Year: Not on file    Number of [place] in the Last Year: Not on file    Unstable Housing in the Last Year: Not on file       DRUG/FOOD ALLERGIES: Cephalexin    CURRENT MEDICATIONS  Prior to Admission medications    Medication Sig Start Date End Date Taking? Authorizing Provider   insulin glargine (LANTUS;BASAGLAR) 100 UNIT/ML injection pen Inject 35 Units into the skin 2 times daily 11/13/21  Yes Madeline Dominique MD   insulin lispro, 1 Unit Dial, 100 UNIT/ML SOPN Inject 15 Units into the skin 3 times daily (before meals) 11/13/21  Yes Madeline Dominique MD   Insulin Pen Needle 31G X 8 MM MISC 1 each by Does not apply route daily 11/13/21  Yes Betty Velazquez MD   empagliflozin (JARDIANCE) 10 MG tablet Take 10 mg by mouth daily 6/30/21  Yes Historical Provider, MD   gabapentin (NEURONTIN) 600 MG tablet Take 600 mg by mouth 3 times daily.  10/13/21  Yes Historical Provider, MD citalopram (CELEXA) 10 MG tablet TAKE 1 TABLET BY MOUTH EVERY DAY 10/26/21  Yes Historical Provider, MD   vitamin D (ERGOCALCIFEROL) 1.25 MG (47121 UT) CAPS capsule TAKE 1 CAPSULE BY MOUTH ONE TIME A WEEK 10/19/21  Yes Historical Provider, MD   VORTIoxetine HBr (TRINTELLIX) 20 MG TABS tablet Take 20 mg by mouth daily   Yes Historical Provider, MD   hydrochlorothiazide (MICROZIDE) 12.5 MG capsule Take 12.5 mg by mouth daily   Yes Historical Provider, MD   Cholecalciferol (VITAMIN D3) 2000 units CAPS Take by mouth   Yes Historical Provider, MD   atorvastatin (LIPITOR) 40 MG tablet Take 40 mg by mouth daily   Yes Historical Provider, MD   buprenorphine-naloxone (SUBOXONE) 8-2 MG SUBL SL tablet Place 1 tablet under the tongue daily. Yes Historical Provider, MD   lisinopril (PRINIVIL;ZESTRIL) 40 MG tablet Take 40 mg by mouth daily   Yes Historical Provider, MD       Lab Studies:  Lab Results   Component Value Date    WBC 7.8 11/13/2021    HGB 10.0 (L) 11/13/2021    HCT 30.6 (L) 11/13/2021    MCV 82.4 11/13/2021     11/13/2021     Lab Results   Component Value Date    GLUCOSE 199 (H) 11/13/2021    BUN 19 11/13/2021    CREATININE 0.6 (L) 11/13/2021    K 4.1 11/13/2021     11/13/2021     11/13/2021    CALCIUM 8.6 11/13/2021     Lab Results   Component Value Date    MG 1.90 11/13/2021     Lab Results   Component Value Date    PHOS 4.1 11/13/2021     Lab Results   Component Value Date    ALKPHOS 96 11/08/2021    ALT 10 11/08/2021    AST 8 (L) 11/08/2021    BILITOT 0.3 11/08/2021    PROT 7.9 11/08/2021         Review of Systems   Constitutional: Negative for activity change, appetite change, chills, fatigue and fever. HENT: Negative for congestion, ear discharge, ear pain, facial swelling, hearing loss, nosebleeds, postnasal drip, rhinorrhea, sinus pressure, sinus pain, sneezing, sore throat, tinnitus, trouble swallowing and voice change.     Eyes: Negative for pain, redness, itching and visual disturbance. Respiratory: Negative for cough, choking and shortness of breath. Gastrointestinal: Negative for diarrhea and nausea. Endocrine: Negative for cold intolerance and heat intolerance. Objective:   Physical Exam  Constitutional:       General: He is not in acute distress. Appearance: He is well-developed. HENT:      Head: Not macrocephalic and not microcephalic. No abrasion or contusion. Mouth/Throat:      Lips: No lesions. Mouth: No oral lesions. Dentition: Normal dentition. Tongue: No lesions. Palate: No mass. Pharynx: No oropharyngeal exudate, posterior oropharyngeal erythema or uvula swelling. Tonsils: No tonsillar abscesses. Neck:      Thyroid: No thyroid mass or thyromegaly. Trachea: No tracheal tenderness or tracheal deviation. Cardiovascular:      Rate and Rhythm: Normal rate and regular rhythm. Pulmonary:      Breath sounds: No stridor. Musculoskeletal:      Cervical back: Normal range of motion and neck supple. No edema or erythema. No muscular tenderness. Lymphadenopathy:      Head:      Right side of head: No submental, submandibular, tonsillar, preauricular, posterior auricular or occipital adenopathy. Left side of head: No submental, submandibular, tonsillar, preauricular or occipital adenopathy. Cervical: No cervical adenopathy. Right cervical: No superficial, deep or posterior cervical adenopathy. Left cervical: No superficial, deep or posterior cervical adenopathy. Skin:     General: Skin is warm and dry. Findings: No lesion. Neurological:      Mental Status: He is alert and oriented to person, place, and time. Psychiatric:         Mood and Affect: Mood is not anxious or depressed.        NECK ULTRASOUND    Pre-op Diagnosis: Neck abscess  Anesthesia: None  Complications: none  Estimated Blood Loss: none  Indications: Localization of any retained fluid or abscess  Procedure: neck US    The patient was placed in a semi-recumbent position with mild neck extension. Real time B-mode ultrasound on the neck was performed in transverse/ axial and longitudinal/ sagittal planes. Findings:   No evidence of persistent or new fluid or abscesses. Streaking inflammation over masseter and SCM. The patient tolerated the procedure well and without side effects. I attest that I was present for and did the entire procedure myself. Assessment:       Diagnosis Orders   1. Neck abscess             Plan:      Continued improvement of neck edema and inflammation. No evidence of persistent infection. Length of IV antibiotics per Infectious Disease. Follow up as needed.            Ashleigh Moreno MD

## 2021-11-30 ENCOUNTER — TELEPHONE (OUTPATIENT)
Dept: INFECTIOUS DISEASES | Age: 41
End: 2021-11-30

## 2021-11-30 NOTE — TELEPHONE ENCOUNTER
Pt last day of home abx was today. Pt is wanting to know what is the next step. Can PICC line be removed? If not; does he need one more dose for tomorrow.  Pt can be reached at [phone]

## 2021-11-30 NOTE — TELEPHONE ENCOUNTER
Called pt --    Pt saw ENT, Dr Cinthya Saunders on 11/29  Reviewed note, improved, has some 'streaking inflammation' on ultrasound in office  Pt concerned by persistent swelling     Will continue Iv antibiotic - invanz x 1 weeks further, new end date 12/8

## 2021-12-06 ENCOUNTER — TELEPHONE (OUTPATIENT)
Dept: INFECTIOUS DISEASES | Age: 41
End: 2021-12-06

## 2021-12-07 ENCOUNTER — TELEPHONE (OUTPATIENT)
Dept: PRIMARY CARE CLINIC | Age: 41
End: 2021-12-07

## 2021-12-08 NOTE — TELEPHONE ENCOUNTER
Called pt --  Pt described a little bit of lump  Pt c/o nausea, loose stool over few days, 'calmed down today'    Will d/c invanz, d/c PICC  Pt to call if worsening GI or ENT problems

## 2021-12-08 NOTE — TELEPHONE ENCOUNTER
Patient returned Dr. Ros arredondo. Patient states he has had nausea and diarrhea and that is why he has not had his labs done.

## 2021-12-09 NOTE — TELEPHONE ENCOUNTER
Spoke with Jessica Torres at [facility] and advised of MD note. Verbalized understanding    Spoke with Tsering nurse at facility and advised of MD note.   Verbalized understanding

## 2021-12-13 LAB
FUNGUS (MYCOLOGY) CULTURE: ABNORMAL
FUNGUS STAIN: ABNORMAL
ORGANISM: ABNORMAL

## 2021-12-28 LAB
AFB CULTURE (MYCOBACTERIA): NORMAL
AFB SMEAR: NORMAL

## 2022-01-03 ENCOUNTER — OFFICE VISIT (OUTPATIENT)
Dept: ENT CLINIC | Age: 42
End: 2022-01-03

## 2022-01-03 VITALS
DIASTOLIC BLOOD PRESSURE: 81 MMHG | BODY MASS INDEX: 40.43 KG/M2 | SYSTOLIC BLOOD PRESSURE: 116 MMHG | WEIGHT: 315 LBS | HEIGHT: 74 IN | HEART RATE: 89 BPM

## 2022-01-03 DIAGNOSIS — Z48.89 POSTOPERATIVE VISIT: ICD-10-CM

## 2022-01-03 DIAGNOSIS — L02.11 NECK ABSCESS: Primary | ICD-10-CM

## 2022-01-03 PROCEDURE — 99024 POSTOP FOLLOW-UP VISIT: CPT | Performed by: OTOLARYNGOLOGY

## 2022-01-03 NOTE — PROGRESS NOTES
S/P I and D large left sided neck abscess. Dental in origin. Now 8 weeks post-operative. Patient complains of continued stifness in neck and jaw. PE: No lesions or masses. Imrpoved inflammation and firmness. Ultrasound neck:    NECK ULTRASOUND    Pre-op Diagnosis: neck abscess surveillance  Anesthesia: None  Complications: none  Estimated Blood Loss: none  Indications: Continued complaints  Procedure: neck US    The patient was placed in a semi-recumbent position with mild neck extension. Real time B-mode ultrasound on the neck was performed in transverse/ axial and longitudinal/ sagittal planes. Findings: No abscess    Ultrasound guided fine needle aspiration was not performed. The patient tolerated the procedure well and without side effects. I attest that I was present for and did the entire procedure myself. A/P: Follow up as needed.

## 2022-02-14 ENCOUNTER — HOSPITAL ENCOUNTER (INPATIENT)
Age: 42
LOS: 3 days | Discharge: HOME | DRG: 380 | End: 2022-02-17
Attending: EMERGENCY MEDICINE | Admitting: INTERNAL MEDICINE
Payer: COMMERCIAL

## 2022-02-14 ENCOUNTER — APPOINTMENT (OUTPATIENT)
Dept: GENERAL RADIOLOGY | Age: 42
DRG: 380 | End: 2022-02-14
Payer: COMMERCIAL

## 2022-02-14 DIAGNOSIS — L08.9 TYPE 2 DIABETES MELLITUS WITH RIGHT DIABETIC FOOT INFECTION (HCC): Primary | ICD-10-CM

## 2022-02-14 DIAGNOSIS — E11.628 TYPE 2 DIABETES MELLITUS WITH RIGHT DIABETIC FOOT INFECTION (HCC): Primary | ICD-10-CM

## 2022-02-14 LAB
ANION GAP SERPL CALCULATED.3IONS-SCNC: 9 MMOL/L (ref 3–16)
BASE EXCESS VENOUS: -0.5 MMOL/L (ref -2–3)
BASOPHILS ABSOLUTE: 0 K/UL (ref 0–0.2)
BASOPHILS RELATIVE PERCENT: 0.3 %
BUN BLDV-MCNC: 20 MG/DL (ref 7–20)
C-REACTIVE PROTEIN: 22.5 MG/L (ref 0–5.1)
CALCIUM SERPL-MCNC: 9.1 MG/DL (ref 8.3–10.6)
CARBOXYHEMOGLOBIN: 0.7 % (ref 0–1.5)
CHLORIDE BLD-SCNC: 105 MMOL/L (ref 99–110)
CO2: 24 MMOL/L (ref 21–32)
CREAT SERPL-MCNC: 0.6 MG/DL (ref 0.9–1.3)
EOSINOPHILS ABSOLUTE: 0.1 K/UL (ref 0–0.6)
EOSINOPHILS RELATIVE PERCENT: 0.8 %
GFR AFRICAN AMERICAN: >60
GFR NON-AFRICAN AMERICAN: >60
GLUCOSE BLD-MCNC: 100 MG/DL (ref 70–99)
GLUCOSE BLD-MCNC: 97 MG/DL (ref 70–99)
HCO3 VENOUS: 24.6 MMOL/L (ref 24–28)
HCT VFR BLD CALC: 33.7 % (ref 40.5–52.5)
HEMOGLOBIN, VEN, REDUCED: 0 %
HEMOGLOBIN: 10.6 G/DL (ref 13.5–17.5)
LACTIC ACID: 0.8 MMOL/L (ref 0.4–2)
LYMPHOCYTES ABSOLUTE: 1.9 K/UL (ref 1–5.1)
LYMPHOCYTES RELATIVE PERCENT: 17.7 %
MCH RBC QN AUTO: 24.6 PG (ref 26–34)
MCHC RBC AUTO-ENTMCNC: 31.4 G/DL (ref 31–36)
MCV RBC AUTO: 78.4 FL (ref 80–100)
METHEMOGLOBIN VENOUS: 0.2 % (ref 0–1.5)
MONOCYTES ABSOLUTE: 0.6 K/UL (ref 0–1.3)
MONOCYTES RELATIVE PERCENT: 5.4 %
NEUTROPHILS ABSOLUTE: 8 K/UL (ref 1.7–7.7)
NEUTROPHILS RELATIVE PERCENT: 75.8 %
O2 SAT, VEN: 99 %
PCO2, VEN: 41.6 MMHG (ref 41–51)
PDW BLD-RTO: 14.7 % (ref 12.4–15.4)
PERFORMED ON: ABNORMAL
PH VENOUS: 7.38 (ref 7.35–7.45)
PLATELET # BLD: 400 K/UL (ref 135–450)
PMV BLD AUTO: 8 FL (ref 5–10.5)
PO2, VEN: 132 MMHG (ref 25–40)
POTASSIUM REFLEX MAGNESIUM: 4.1 MMOL/L (ref 3.5–5.1)
RBC # BLD: 4.29 M/UL (ref 4.2–5.9)
SEDIMENTATION RATE, ERYTHROCYTE: 42 MM/HR (ref 0–15)
SODIUM BLD-SCNC: 138 MMOL/L (ref 136–145)
TCO2 CALC VENOUS: 26 MMOL/L
WBC # BLD: 10.5 K/UL (ref 4–11)

## 2022-02-14 PROCEDURE — 1200000000 HC SEMI PRIVATE

## 2022-02-14 PROCEDURE — 86140 C-REACTIVE PROTEIN: CPT

## 2022-02-14 PROCEDURE — 84134 ASSAY OF PREALBUMIN: CPT

## 2022-02-14 PROCEDURE — 87186 SC STD MICRODIL/AGAR DIL: CPT

## 2022-02-14 PROCEDURE — 6360000002 HC RX W HCPCS: Performed by: INTERNAL MEDICINE

## 2022-02-14 PROCEDURE — 83036 HEMOGLOBIN GLYCOSYLATED A1C: CPT

## 2022-02-14 PROCEDURE — 85652 RBC SED RATE AUTOMATED: CPT

## 2022-02-14 PROCEDURE — 6360000002 HC RX W HCPCS: Performed by: STUDENT IN AN ORGANIZED HEALTH CARE EDUCATION/TRAINING PROGRAM

## 2022-02-14 PROCEDURE — 36415 COLL VENOUS BLD VENIPUNCTURE: CPT

## 2022-02-14 PROCEDURE — 87077 CULTURE AEROBIC IDENTIFY: CPT

## 2022-02-14 PROCEDURE — 73630 X-RAY EXAM OF FOOT: CPT

## 2022-02-14 PROCEDURE — 80048 BASIC METABOLIC PNL TOTAL CA: CPT

## 2022-02-14 PROCEDURE — 6370000000 HC RX 637 (ALT 250 FOR IP): Performed by: INTERNAL MEDICINE

## 2022-02-14 PROCEDURE — 2580000003 HC RX 258: Performed by: STUDENT IN AN ORGANIZED HEALTH CARE EDUCATION/TRAINING PROGRAM

## 2022-02-14 PROCEDURE — 86403 PARTICLE AGGLUT ANTBDY SCRN: CPT

## 2022-02-14 PROCEDURE — 2500000003 HC RX 250 WO HCPCS: Performed by: STUDENT IN AN ORGANIZED HEALTH CARE EDUCATION/TRAINING PROGRAM

## 2022-02-14 PROCEDURE — 87205 SMEAR GRAM STAIN: CPT

## 2022-02-14 PROCEDURE — 85025 COMPLETE CBC W/AUTO DIFF WBC: CPT

## 2022-02-14 PROCEDURE — 87040 BLOOD CULTURE FOR BACTERIA: CPT

## 2022-02-14 PROCEDURE — 99284 EMERGENCY DEPT VISIT MOD MDM: CPT

## 2022-02-14 PROCEDURE — 83605 ASSAY OF LACTIC ACID: CPT

## 2022-02-14 PROCEDURE — 82803 BLOOD GASES ANY COMBINATION: CPT

## 2022-02-14 PROCEDURE — 87070 CULTURE OTHR SPECIMN AEROBIC: CPT

## 2022-02-14 RX ORDER — DEXTROSE MONOHYDRATE 50 MG/ML
100 INJECTION, SOLUTION INTRAVENOUS PRN
Status: DISCONTINUED | OUTPATIENT
Start: 2022-02-14 | End: 2022-02-17 | Stop reason: HOSPADM

## 2022-02-14 RX ORDER — ACETAMINOPHEN 325 MG/1
650 TABLET ORAL EVERY 6 HOURS PRN
Status: DISCONTINUED | OUTPATIENT
Start: 2022-02-14 | End: 2022-02-17 | Stop reason: HOSPADM

## 2022-02-14 RX ORDER — PIOGLITAZONEHYDROCHLORIDE 30 MG/1
30 TABLET ORAL DAILY
COMMUNITY
Start: 2021-05-24

## 2022-02-14 RX ORDER — CLINDAMYCIN PHOSPHATE 600 MG/50ML
600 INJECTION INTRAVENOUS EVERY 8 HOURS
Status: DISCONTINUED | OUTPATIENT
Start: 2022-02-15 | End: 2022-02-14

## 2022-02-14 RX ORDER — NICOTINE POLACRILEX 4 MG
15 LOZENGE BUCCAL PRN
Status: DISCONTINUED | OUTPATIENT
Start: 2022-02-14 | End: 2022-02-17 | Stop reason: HOSPADM

## 2022-02-14 RX ORDER — CIPROFLOXACIN 500 MG/1
500 TABLET, FILM COATED ORAL ONCE
Status: DISCONTINUED | OUTPATIENT
Start: 2022-02-14 | End: 2022-02-14

## 2022-02-14 RX ORDER — INSULIN LISPRO 100 [IU]/ML
0-6 INJECTION, SOLUTION INTRAVENOUS; SUBCUTANEOUS
Status: DISCONTINUED | OUTPATIENT
Start: 2022-02-14 | End: 2022-02-17 | Stop reason: HOSPADM

## 2022-02-14 RX ORDER — GABAPENTIN 600 MG/1
1200 TABLET ORAL 3 TIMES DAILY
Status: DISCONTINUED | OUTPATIENT
Start: 2022-02-14 | End: 2022-02-17 | Stop reason: HOSPADM

## 2022-02-14 RX ORDER — DIPHENHYDRAMINE HYDROCHLORIDE 50 MG/ML
12.5 INJECTION INTRAMUSCULAR; INTRAVENOUS
Status: COMPLETED | OUTPATIENT
Start: 2022-02-14 | End: 2022-02-14

## 2022-02-14 RX ORDER — ESCITALOPRAM OXALATE 10 MG/1
10 TABLET ORAL DAILY
Status: DISCONTINUED | OUTPATIENT
Start: 2022-02-15 | End: 2022-02-14

## 2022-02-14 RX ORDER — CLINDAMYCIN PHOSPHATE 600 MG/50ML
600 INJECTION INTRAVENOUS ONCE
Status: COMPLETED | OUTPATIENT
Start: 2022-02-14 | End: 2022-02-14

## 2022-02-14 RX ORDER — PANTOPRAZOLE SODIUM 40 MG/1
40 TABLET, DELAYED RELEASE ORAL
Status: DISCONTINUED | OUTPATIENT
Start: 2022-02-15 | End: 2022-02-17 | Stop reason: HOSPADM

## 2022-02-14 RX ORDER — ATORVASTATIN CALCIUM 40 MG/1
40 TABLET, FILM COATED ORAL DAILY
Status: DISCONTINUED | OUTPATIENT
Start: 2022-02-15 | End: 2022-02-17 | Stop reason: HOSPADM

## 2022-02-14 RX ORDER — LINEZOLID 2 MG/ML
600 INJECTION, SOLUTION INTRAVENOUS EVERY 12 HOURS
Status: DISCONTINUED | OUTPATIENT
Start: 2022-02-14 | End: 2022-02-15

## 2022-02-14 RX ORDER — ONDANSETRON 4 MG/1
4 TABLET, ORALLY DISINTEGRATING ORAL EVERY 8 HOURS PRN
Status: DISCONTINUED | OUTPATIENT
Start: 2022-02-14 | End: 2022-02-17 | Stop reason: HOSPADM

## 2022-02-14 RX ORDER — DEXTROSE MONOHYDRATE 25 G/50ML
12.5 INJECTION, SOLUTION INTRAVENOUS PRN
Status: DISCONTINUED | OUTPATIENT
Start: 2022-02-14 | End: 2022-02-14 | Stop reason: SDUPTHER

## 2022-02-14 RX ORDER — SODIUM CHLORIDE, SODIUM LACTATE, POTASSIUM CHLORIDE, AND CALCIUM CHLORIDE .6; .31; .03; .02 G/100ML; G/100ML; G/100ML; G/100ML
1000 INJECTION, SOLUTION INTRAVENOUS ONCE
Status: COMPLETED | OUTPATIENT
Start: 2022-02-14 | End: 2022-02-14

## 2022-02-14 RX ORDER — CLINDAMYCIN HYDROCHLORIDE 300 MG/1
300 CAPSULE ORAL 4 TIMES DAILY
Qty: 56 CAPSULE | Refills: 0 | Status: SHIPPED | OUTPATIENT
Start: 2022-02-14 | End: 2022-02-28

## 2022-02-14 RX ORDER — INSULIN LISPRO 100 [IU]/ML
0-3 INJECTION, SOLUTION INTRAVENOUS; SUBCUTANEOUS NIGHTLY
Status: DISCONTINUED | OUTPATIENT
Start: 2022-02-14 | End: 2022-02-17 | Stop reason: HOSPADM

## 2022-02-14 RX ORDER — OMEPRAZOLE 40 MG/1
CAPSULE, DELAYED RELEASE ORAL
COMMUNITY
Start: 2022-02-13

## 2022-02-14 RX ORDER — CLINDAMYCIN HYDROCHLORIDE 300 MG/1
300 CAPSULE ORAL ONCE
Status: DISCONTINUED | OUTPATIENT
Start: 2022-02-14 | End: 2022-02-14

## 2022-02-14 RX ORDER — ACETAMINOPHEN 650 MG/1
650 SUPPOSITORY RECTAL EVERY 6 HOURS PRN
Status: DISCONTINUED | OUTPATIENT
Start: 2022-02-14 | End: 2022-02-17 | Stop reason: HOSPADM

## 2022-02-14 RX ORDER — BUPRENORPHINE HYDROCHLORIDE AND NALOXONE HYDROCHLORIDE DIHYDRATE 2; .5 MG/1; MG/1
4 TABLET SUBLINGUAL DAILY
Status: DISCONTINUED | OUTPATIENT
Start: 2022-02-15 | End: 2022-02-17 | Stop reason: HOSPADM

## 2022-02-14 RX ORDER — SODIUM CHLORIDE 0.9 % (FLUSH) 0.9 %
5-40 SYRINGE (ML) INJECTION EVERY 12 HOURS SCHEDULED
Status: DISCONTINUED | OUTPATIENT
Start: 2022-02-14 | End: 2022-02-17 | Stop reason: HOSPADM

## 2022-02-14 RX ORDER — CIPROFLOXACIN 500 MG/1
500 TABLET, FILM COATED ORAL 2 TIMES DAILY
Qty: 28 TABLET | Refills: 0 | Status: SHIPPED | OUTPATIENT
Start: 2022-02-14 | End: 2022-02-28

## 2022-02-14 RX ORDER — ESCITALOPRAM OXALATE 20 MG/1
20 TABLET ORAL DAILY
COMMUNITY
Start: 2022-01-18 | End: 2022-05-09

## 2022-02-14 RX ORDER — SODIUM CHLORIDE 9 MG/ML
25 INJECTION, SOLUTION INTRAVENOUS PRN
Status: DISCONTINUED | OUTPATIENT
Start: 2022-02-14 | End: 2022-02-17 | Stop reason: HOSPADM

## 2022-02-14 RX ORDER — ONDANSETRON 2 MG/ML
4 INJECTION INTRAMUSCULAR; INTRAVENOUS EVERY 6 HOURS PRN
Status: DISCONTINUED | OUTPATIENT
Start: 2022-02-14 | End: 2022-02-17 | Stop reason: HOSPADM

## 2022-02-14 RX ORDER — SODIUM CHLORIDE 0.9 % (FLUSH) 0.9 %
5-40 SYRINGE (ML) INJECTION 2 TIMES DAILY
Status: DISCONTINUED | OUTPATIENT
Start: 2022-02-14 | End: 2022-02-14 | Stop reason: SDUPTHER

## 2022-02-14 RX ORDER — NITROGLYCERIN 20 MG/100ML
5-200 INJECTION INTRAVENOUS CONTINUOUS
Status: DISCONTINUED | OUTPATIENT
Start: 2022-02-14 | End: 2022-02-14

## 2022-02-14 RX ORDER — CIPROFLOXACIN 2 MG/ML
400 INJECTION, SOLUTION INTRAVENOUS EVERY 12 HOURS
Status: DISCONTINUED | OUTPATIENT
Start: 2022-02-15 | End: 2022-02-14

## 2022-02-14 RX ORDER — POLYETHYLENE GLYCOL 3350 17 G/17G
17 POWDER, FOR SOLUTION ORAL DAILY PRN
Status: DISCONTINUED | OUTPATIENT
Start: 2022-02-14 | End: 2022-02-17 | Stop reason: HOSPADM

## 2022-02-14 RX ORDER — SODIUM CHLORIDE 0.9 % (FLUSH) 0.9 %
5-40 SYRINGE (ML) INJECTION PRN
Status: DISCONTINUED | OUTPATIENT
Start: 2022-02-14 | End: 2022-02-17 | Stop reason: HOSPADM

## 2022-02-14 RX ORDER — CIPROFLOXACIN 2 MG/ML
400 INJECTION, SOLUTION INTRAVENOUS ONCE
Status: DISCONTINUED | OUTPATIENT
Start: 2022-02-14 | End: 2022-02-14 | Stop reason: ALTCHOICE

## 2022-02-14 RX ADMIN — INSULIN GLARGINE 20 UNITS: 100 INJECTION, SOLUTION SUBCUTANEOUS at 22:50

## 2022-02-14 RX ADMIN — DIPHENHYDRAMINE HYDROCHLORIDE 12.5 MG: 50 INJECTION, SOLUTION INTRAMUSCULAR; INTRAVENOUS at 20:15

## 2022-02-14 RX ADMIN — CIPROFLOXACIN 400 MG: 2 INJECTION, SOLUTION INTRAVENOUS at 18:38

## 2022-02-14 RX ADMIN — ENOXAPARIN SODIUM 40 MG: 100 INJECTION SUBCUTANEOUS at 20:15

## 2022-02-14 RX ADMIN — GABAPENTIN 1200 MG: 600 TABLET, FILM COATED ORAL at 22:49

## 2022-02-14 RX ADMIN — SODIUM CHLORIDE, POTASSIUM CHLORIDE, SODIUM LACTATE AND CALCIUM CHLORIDE 1000 ML: 600; 310; 30; 20 INJECTION, SOLUTION INTRAVENOUS at 17:56

## 2022-02-14 RX ADMIN — CLINDAMYCIN PHOSPHATE 600 MG: 600 INJECTION, SOLUTION INTRAVENOUS at 18:03

## 2022-02-14 ASSESSMENT — PAIN SCALES - GENERAL
PAINLEVEL_OUTOF10: 0
PAINLEVEL_OUTOF10: 0

## 2022-02-14 NOTE — ED PROVIDER NOTES
ED Attending Attestation Note     Date of evaluation: 2/14/2022    This patient was seen by the resident. I have seen and examined the patient, agree with the workup, evaluation, management and diagnosis. The care plan has been discussed. My assessment reveals adult male in no acute distress with necrotic ulcer on the lateral aspect of the right foot. No leg erythema to suggest a prescribed cellulitis, podiatry concern for possible necrosis infection of the wound. Arturo Lee MD  02/14/22 [id number]

## 2022-02-14 NOTE — CONSULTS
Department of Podiatry Consult Note  Resident       Reason for Consult: Right lateral foot infection    Requesting Physician:  Dr. Lalitha Contreras: Right foot pain along with nausea and vomiting    HISTORY OF PRESENT ILLNESS:                The patient is a 35 y.o. male with significant past medical history please see list below. Podiatry was consulted for right foot pain. Patient was last seen in our care on 11/12/2021 for similar presentation of wound to the right and left foot. Patient follows up with Dr. Janeth Winter at [facility]. Patient states has been a while since his last seen his podiatrist.  Patient states that he has been doing daily dressing changes. Patient's main concern for coming to the hospital is nausea along with vomiting and right foot pain. Patient was concerned with increased odor to the right foot. Patient states that he has had this wound that is progressively getting worse for the last couple of weeks. Patient pain rate is a 4/10 on the pain scale. Patient describes the pain as sharp in nature. Patient endorses nausea and vomiting earlier this morning. Patient denies shortness of breath, fever, chills or other constitutional symptoms. Patient has no other pedal complaints or today's examination.         Past Medical History:        Diagnosis Date    Depression     Diabetes mellitus ([address]     Hyperlipidemia     Hypertension     Osteoarthritis of both knees      Past Surgical History:        Procedure Laterality Date    FOOT SURGERY Left 02/19/2018     INCISION AND DRAINAGE WITH HALLUX AMPUTATION LEFT FOOT    KNEE SURGERY Right 6/19/2013    NECK SURGERY Left 11/9/2021    LEFT FACIAL ABSCESS INCISION AND DRAINAGE performed by Linzie Buerger, MD at [facility] Left     2nd toe    TOE AMPUTATION Right 9/26/2019    RIGHT HALLUX AMPUTATION WITH POSSIBLE 1ST RAY AMPUTATION performed by Pamella Valente DPM at [address] OR     Current Medications: No current facility-administered medications for this encounter. Allergies:   Cephalexin  Social History:    TOBACCO:   reports that he has never smoked. He has never used smokeless tobacco.  Family History:   History reviewed. No pertinent family history. REVIEW OF SYSTEMS:    A 10 point review of systems was conducted, significant findings as noted in HPI. All other systems negative. PHYSICAL EXAM:      Vitals: There were no vitals taken for this visit. LABS:   No results for input(s): WBC, HGB, HCT, PLT in the last 72 hours. No results for input(s): NA, K, CL, CO2, PHOS, BUN, CREATININE, CA in the last 72 hours. No results for input(s): PROT, INR, APTT in the last 72 hours. VASCULAR:   - DP and PT pulses are non palpable  b/l. - Upon hand-held Doppler biphasic signals noted to DP and PT pulses B/L LE.  - CFT is brisk to the right TMA stump site. -CFT is brisk to the remaining distal digits on the left foot. - Skin temperature is warm to lukewarm from proximal to distal with focal calor noted lateral aspect right foot. - Nonpitting edema noted R>L LE.   - No pain with calf compression b/l. NEUROLOGIC:   - Gross and epicritic sensation is diminished b/l.   - Protective sensation is diminished at all pedal sites b/l. DERMATOLOGIC:   Dermatological changes noted B/L LE    Right lower extremity:    #1  -Full-thickness circumferential ulceration noted to the lateral aspect of the fifth metatarsal.  -Wound measures approximately 4.0 x 3.3 x 0.5.  -Wound base central necrotic tissue with surrounding granular and slightly fibrotic.  -Wound does probe to bone, but is covered by soft tissue.  -No tunneling or tracking noted.  -Slight fetid malodor noted. -No fluctuance, crepitus, or drainage noted.  -Acute periwound infection with erythema noted.     #2   -Superficial abrasion noted distal medial plantar aspect of TMA stump.  -Base dermal tissue.  -Wound does not probe to bone, tunnel, or track.  -No fluctuance, crepitus, malodor, or drainage noted.  -No periwound erythema or acute signs of infection noted. Picture taken 2/14/2022            Left lower extremity:  -Multiple superficial abrasions noted to the third, fourth, and fifth digit.  -Dried sanguinous crust noted to the fifth digit 2/2 hitting bedpost.  -Wound base is dermal to epithelialized tissue noted.  -Wound does not probe to bone, tunnel, or track.  -No fluctuance, crepitus, malodor, or drainage noted.  -No periwound erythema or acute signs of infection noted. Picture taken 2/14/2022    Patient gave verbal consent for the picture taken at today's visit. MUSCULOSKELETAL:   - Muscle strength is 5/5 for all pedal groups tested. - No pain with palpation of the foot or ankle b/l. - Ankle joint ROM is decreased in dorsiflexion with the knee extended.   -Transmetatarsal amputation noted to RLE.  - Partial first digit and second digit amputation to the LLE. IMAGING:  Right foot x-rays 2/14/2022      Impression   Impression: Prior to dictation of all digits at the level of the metatarsal heads. No acute destructive osseous process          IMPRESSION/RECOMMENDATIONS:    Diabetic foot infection, right foot  Full-thickness ulceration, right lower extremity; Vaibhav Kimberly II (POA)  Cellulitis right foot  Multiple superficial abrasions; bilateral lower extremity  Diabetes mellitus with peripheral neuropathy  History of amputations; bilateral lower extremity          -Patient seen and examined at ED bedside this afternoon  -VSS, afebrile  -ESR and CRP; pending  -HbA1c; pending  -prealbumin; pending  -X-ray right foot images reviewed, impression noted above  -Wound culture obtained right lateral foot; pending  -Continue IV antibiotics Invanz.  -Infectious disease consulted, recommendations appreciated.   -Right lower extremity dressed with Betadine, DSD, and Ace bandage.  -Post-op shoe ordered to patient room  -Heel weightbearing to right LE and full weightbearing to left lower extremity  -Lengthy discussion with patient regarding the importance of extremity elevation and edema control, patient voiced understanding  -Lengthy discussion with patient regarding infection and the need for possible surgical intervention       DISPO: Full-thickness ulceration with superimposed cellulitis and diabetic foot infection to the right lower extremity. Will follow-up on all labs. Patient would benefit from admission and IV antibiotics. Will follow patient closely while in house. Thank you for the opportunity to take part in the patient's care.      -Patient seen and examined at ED bedside with Dr. Siddharth Singleton, DPM   Podiatric Resident, PGY-3  Pager: [phone] or Perfect serve         This chart was likely completed using voice recognition technology and may contain unintended grammatical , phraseology,and/or punctuation errors

## 2022-02-14 NOTE — H&P
Hospital Medicine History & Physical      PCP: Dawson Mayer MD, MD    Date of Admission: 2/14/2022    Date of Service: Pt seen/examined on 02/14/22 and Admitted to Inpatient with expected LOS greater than two midnights due to medical therapy. Chief Complaint:  Foot pain, nausea and vomiting    History Of Present Illness:      Kevin Frazier is a 35 y.o. male with past medical history as below, including DMII, DFI, admission for neck abscess in November, completed course of ertapanem. Presents with complaint of increase right foot pain to side of right foot and worsening odor over the last week. Pain is sharp and has been getting worse. Does not think he has had any fevers. He has also had some vomiting but says this is a chronic problem and has not been any worse recently. Past Medical History:          Diagnosis Date    Depression     Diabetes mellitus ([address]     Hyperlipidemia     Hypertension     Osteoarthritis of both knees        Past Surgical History:          Procedure Laterality Date    FOOT SURGERY Left 02/19/2018     INCISION AND DRAINAGE WITH HALLUX AMPUTATION LEFT FOOT    KNEE SURGERY Right 6/19/2013    NECK SURGERY Left 11/9/2021    LEFT FACIAL ABSCESS INCISION AND DRAINAGE performed by Mckinley Padilla MD at [place] [address] Left     2nd toe    TOE AMPUTATION Right 9/26/2019    RIGHT HALLUX AMPUTATION WITH POSSIBLE 1ST RAY AMPUTATION performed by Jarek Peterson DPM at [address]       Medications Prior to Admission:      Prior to Admission medications    Medication Sig Start Date End Date Taking? Authorizing Provider   escitalopram (LEXAPRO) 20 MG tablet Take 10 mg by mouth daily 1/18/22 2/17/22 Yes Historical Provider, MD   omeprazole (PRILOSEC) 40 MG delayed release capsule Take 1 capsule (40 mg) by mouth daily 30 minutes before breakfast and dinner.  2/13/22  Yes Historical Provider, MD   pioglitazone (ACTOS) 30 MG tablet Take 30 mg by mouth daily 5/24/21  Yes Historical Provider, MD   ciprofloxacin (CIPRO) 500 MG tablet Take 1 tablet by mouth 2 times daily for 14 days 2/14/22 2/28/22 Yes Coy Wilks MD   clindamycin (CLEOCIN) 300 MG capsule Take 1 capsule by mouth 4 times daily for 14 days 2/14/22 2/28/22 Yes Coy Wilks MD   insulin glargine (LANTUS;BASAGLAR) 100 UNIT/ML injection pen Inject 35 Units into the skin 2 times daily 11/13/21  Yes Louis Feldman MD   insulin lispro, 1 Unit Dial, 100 UNIT/ML SOPN Inject 15 Units into the skin 3 times daily (before meals) 11/13/21  Yes Louis Feldman MD   empagliflozin (JARDIANCE) 10 MG tablet Take 25 mg by mouth daily  6/30/21  Yes Historical Provider, MD   gabapentin (NEURONTIN) 600 MG tablet Take 1,200 mg by mouth 3 times daily. 10/13/21  Yes Historical Provider, MD   vitamin D (ERGOCALCIFEROL) 1.25 MG (26398 UT) CAPS capsule TAKE 1 CAPSULE BY MOUTH ONE TIME A WEEK 10/19/21  Yes Historical Provider, MD   VORTIoxetine HBr (TRINTELLIX) 20 MG TABS tablet Take 20 mg by mouth daily   Yes Historical Provider, MD   Cholecalciferol (VITAMIN D3) 2000 units CAPS Take by mouth   Yes Historical Provider, MD   atorvastatin (LIPITOR) 40 MG tablet Take 40 mg by mouth daily   Yes Historical Provider, MD   buprenorphine-naloxone (SUBOXONE) 8-2 MG SUBL SL tablet Place 1 tablet under the tongue daily. Yes Historical Provider, MD   Insulin Pen Needle 31G X 8 MM MISC 1 each by Does not apply route daily 11/13/21   Rajan Zhou MD       Allergies:  Cephalexin    Social History:      The patient currently lives in private residence    TOBACCO:   reports that he has never smoked. He has never used smokeless tobacco.  ETOH:   reports no history of alcohol use. Family History:      Reviewed in detail and positive as follows:    History reviewed. No pertinent family history. REVIEW OF SYSTEMS:   Pertinent positives as noted in the HPI. All other systems reviewed and negative.     PHYSICAL EXAM:    BP (!) 93/58   Pulse 60 Temp 98.4 °F (36.9 °C) (Oral)   Resp 16   Ht 6' 2.5\" (1.892 m)   Wt (!) 315 lb (142.9 kg)   SpO2 99%   BMI 39.90 kg/m²     General appearance: No apparent distress, appears stated age and cooperative. HEENT: Normal cephalic, atraumatic without obvious deformity. Pupils equal, round, and reactive to light. Extra ocular muscles intact. Conjunctivae/corneas clear. Neck: Supple, with full range of motion. No jugular venous distention. Trachea midline. Respiratory:  Normal respiratory effort. Clear to auscultation, bilaterally without Rales/Wheezes/Rhonchi. Cardiovascular: Regular rate and rhythm with normal S1/S2 without murmurs, rubs or gallops. Abdomen: Soft, non-tender, non-distended with normal bowel sounds. Musculoskelatal: No clubbing, cyanosis or edema bilaterally. Full range of motion without deformity. Skin: LE wrapped in dressings. Please see podiatry note from the same day. Neurologic:  Neurovascularly intact without any focal sensory/motor deficits. Cranial nerves: II-XII intact, grossly non-focal.  Psychiatric: Alert and oriented, thought content appropriate, normal insight    Labs:     Recent Labs     02/14/22  1436   WBC 10.5   HGB 10.6*   HCT 33.7*        Recent Labs     02/14/22  1436      K 4.1      CO2 24   BUN 20   CREATININE 0.6*   CALCIUM 9.1     No results for input(s): AST, ALT, BILIDIR, BILITOT, ALKPHOS in the last 72 hours. No results for input(s): INR in the last 72 hours. No results for input(s): Jordon Hines in the last 72 hours. Urinalysis:    No results found for: NITRU, WBCUA, BACTERIA, RBCUA, BLOODU, SPECGRAV, GLUCOSEU    Studies:  XR FOOT RIGHT (MIN 3 VIEWS)   Final Result   Impression: Prior to dictation of all digits at the level of the metatarsal heads. No acute destructive osseous process. ASSESSMENT:    There are no active hospital problems to display for this patient.       PLAN:    DFI, full thickness ulceration with superimposed cellulitis  Wound culture collected  Podiatry and ID consulted, appreciate recs  ESR, CRP  Started on Cipro and clindamycin. Noted allergy to keflex (shortness of breath). Patient developed itching and redness on the right arm and the Cipro was stopped. He denies having any previous reaction to Cipro. I went to evaluate the patient but the erythema appears to be resolving at this point. Patient still having itching so the skin does appear to still have some irritation. Switch to Zyvox and Zosyn which he has tolerated in the past.   Given 1 dose IV benadryl    DMII with neuropathy  Decreased home Lantus to 20 units BID for now from 35 units BID  Sliding scale low dose  Hgb A1c  Continue home gabapentin    DVT Prophylaxis: Lovenox  Diet: ADULT DIET; Regular; 3 carb choices (45 gm/meal)  Code Status: Prior    PT/OT Eval Status: ordered    Dispo - Inpatient      Danielle Dutta DO     Thank you Latisha Cabrera MD, MD for the opportunity to be involved in this patient's care. If you have any questions or concerns please feel free to contact me at [facility].

## 2022-02-14 NOTE — FLOWSHEET NOTE
02/14/22 1456   Encounter Summary   Services provided to: Patient   Referral/Consult From: Rounding   Continue Visiting   (2/14/22, ASHWINI.)   Complexity of Encounter Moderate   Length of Encounter 15 minutes   Routine   Type Initial   Assessment Calm; Approachable   Intervention Nurtured hope   Outcome Expressed gratitude

## 2022-02-15 LAB
ANION GAP SERPL CALCULATED.3IONS-SCNC: 8 MMOL/L (ref 3–16)
BASOPHILS ABSOLUTE: 0.1 K/UL (ref 0–0.2)
BASOPHILS RELATIVE PERCENT: 0.7 %
BUN BLDV-MCNC: 18 MG/DL (ref 7–20)
CALCIUM SERPL-MCNC: 8.7 MG/DL (ref 8.3–10.6)
CHLORIDE BLD-SCNC: 107 MMOL/L (ref 99–110)
CO2: 26 MMOL/L (ref 21–32)
CREAT SERPL-MCNC: 0.7 MG/DL (ref 0.9–1.3)
EOSINOPHILS ABSOLUTE: 0.3 K/UL (ref 0–0.6)
EOSINOPHILS RELATIVE PERCENT: 3.5 %
ESTIMATED AVERAGE GLUCOSE: 185.8 MG/DL
GFR AFRICAN AMERICAN: >60
GFR NON-AFRICAN AMERICAN: >60
GLUCOSE BLD-MCNC: 116 MG/DL (ref 70–99)
GLUCOSE BLD-MCNC: 165 MG/DL (ref 70–99)
GLUCOSE BLD-MCNC: 93 MG/DL (ref 70–99)
GLUCOSE BLD-MCNC: 95 MG/DL (ref 70–99)
GLUCOSE BLD-MCNC: 96 MG/DL (ref 70–99)
HBA1C MFR BLD: 8.1 %
HCT VFR BLD CALC: 31 % (ref 40.5–52.5)
HEMOGLOBIN: 9.9 G/DL (ref 13.5–17.5)
LYMPHOCYTES ABSOLUTE: 2 K/UL (ref 1–5.1)
LYMPHOCYTES RELATIVE PERCENT: 26.1 %
MCH RBC QN AUTO: 25.2 PG (ref 26–34)
MCHC RBC AUTO-ENTMCNC: 32 G/DL (ref 31–36)
MCV RBC AUTO: 78.7 FL (ref 80–100)
MONOCYTES ABSOLUTE: 0.6 K/UL (ref 0–1.3)
MONOCYTES RELATIVE PERCENT: 7.8 %
NEUTROPHILS ABSOLUTE: 4.8 K/UL (ref 1.7–7.7)
NEUTROPHILS RELATIVE PERCENT: 61.9 %
PDW BLD-RTO: 14.8 % (ref 12.4–15.4)
PERFORMED ON: ABNORMAL
PERFORMED ON: ABNORMAL
PERFORMED ON: NORMAL
PERFORMED ON: NORMAL
PLATELET # BLD: 353 K/UL (ref 135–450)
PMV BLD AUTO: 7.9 FL (ref 5–10.5)
POTASSIUM REFLEX MAGNESIUM: 3.8 MMOL/L (ref 3.5–5.1)
PREALBUMIN: 16.3 MG/DL (ref 20–40)
RBC # BLD: 3.95 M/UL (ref 4.2–5.9)
SODIUM BLD-SCNC: 141 MMOL/L (ref 136–145)
WBC # BLD: 7.8 K/UL (ref 4–11)

## 2022-02-15 PROCEDURE — 80048 BASIC METABOLIC PNL TOTAL CA: CPT

## 2022-02-15 PROCEDURE — 97535 SELF CARE MNGMENT TRAINING: CPT

## 2022-02-15 PROCEDURE — 6370000000 HC RX 637 (ALT 250 FOR IP): Performed by: INTERNAL MEDICINE

## 2022-02-15 PROCEDURE — 36415 COLL VENOUS BLD VENIPUNCTURE: CPT

## 2022-02-15 PROCEDURE — 2580000003 HC RX 258: Performed by: INTERNAL MEDICINE

## 2022-02-15 PROCEDURE — 99255 IP/OBS CONSLTJ NEW/EST HI 80: CPT | Performed by: INTERNAL MEDICINE

## 2022-02-15 PROCEDURE — 1200000000 HC SEMI PRIVATE

## 2022-02-15 PROCEDURE — 97165 OT EVAL LOW COMPLEX 30 MIN: CPT

## 2022-02-15 PROCEDURE — 85025 COMPLETE CBC W/AUTO DIFF WBC: CPT

## 2022-02-15 PROCEDURE — 6360000002 HC RX W HCPCS: Performed by: INTERNAL MEDICINE

## 2022-02-15 PROCEDURE — 6370000000 HC RX 637 (ALT 250 FOR IP): Performed by: PODIATRIST

## 2022-02-15 PROCEDURE — 97161 PT EVAL LOW COMPLEX 20 MIN: CPT

## 2022-02-15 PROCEDURE — 97116 GAIT TRAINING THERAPY: CPT

## 2022-02-15 RX ORDER — ESCITALOPRAM OXALATE 10 MG/1
10 TABLET ORAL DAILY
Status: DISCONTINUED | OUTPATIENT
Start: 2022-02-15 | End: 2022-02-17 | Stop reason: HOSPADM

## 2022-02-15 RX ADMIN — ENOXAPARIN SODIUM 40 MG: 100 INJECTION SUBCUTANEOUS at 19:27

## 2022-02-15 RX ADMIN — BUPRENORPHINE AND NALOXONE 4 TABLET: 2; .5 TABLET SUBLINGUAL at 09:44

## 2022-02-15 RX ADMIN — INSULIN LISPRO 1 UNITS: 100 INJECTION, SOLUTION INTRAVENOUS; SUBCUTANEOUS at 21:17

## 2022-02-15 RX ADMIN — ESCITALOPRAM OXALATE 10 MG: 10 TABLET ORAL at 16:18

## 2022-02-15 RX ADMIN — ATORVASTATIN CALCIUM 40 MG: 40 TABLET, FILM COATED ORAL at 09:45

## 2022-02-15 RX ADMIN — PANTOPRAZOLE SODIUM 40 MG: 40 TABLET, DELAYED RELEASE ORAL at 06:57

## 2022-02-15 RX ADMIN — LINEZOLID 600 MG: 600 INJECTION, SOLUTION INTRAVENOUS at 12:55

## 2022-02-15 RX ADMIN — PIPERACILLIN SODIUM AND TAZOBACTAM SODIUM 4500 MG: 4; .5 INJECTION, POWDER, LYOPHILIZED, FOR SOLUTION INTRAVENOUS at 23:33

## 2022-02-15 RX ADMIN — INSULIN GLARGINE 20 UNITS: 100 INJECTION, SOLUTION SUBCUTANEOUS at 21:16

## 2022-02-15 RX ADMIN — GABAPENTIN 1200 MG: 600 TABLET, FILM COATED ORAL at 09:45

## 2022-02-15 RX ADMIN — VORTIOXETINE 20 MG: 10 TABLET, FILM COATED ORAL at 16:14

## 2022-02-15 RX ADMIN — GABAPENTIN 1200 MG: 600 TABLET, FILM COATED ORAL at 12:56

## 2022-02-15 RX ADMIN — PIPERACILLIN SODIUM AND TAZOBACTAM SODIUM 4500 MG: 4; .5 INJECTION, POWDER, LYOPHILIZED, FOR SOLUTION INTRAVENOUS at 16:10

## 2022-02-15 RX ADMIN — COLLAGENASE SANTYL: 250 OINTMENT TOPICAL at 09:47

## 2022-02-15 RX ADMIN — PIPERACILLIN SODIUM AND TAZOBACTAM SODIUM 4500 MG: 4; .5 INJECTION, POWDER, LYOPHILIZED, FOR SOLUTION INTRAVENOUS at 07:01

## 2022-02-15 RX ADMIN — PIPERACILLIN SODIUM AND TAZOBACTAM SODIUM 4500 MG: 4; .5 INJECTION, POWDER, LYOPHILIZED, FOR SOLUTION INTRAVENOUS at 01:01

## 2022-02-15 RX ADMIN — SODIUM CHLORIDE, PRESERVATIVE FREE 10 ML: 5 INJECTION INTRAVENOUS at 09:55

## 2022-02-15 RX ADMIN — LINEZOLID 600 MG: 600 INJECTION, SOLUTION INTRAVENOUS at 01:04

## 2022-02-15 RX ADMIN — INSULIN GLARGINE 20 UNITS: 100 INJECTION, SOLUTION SUBCUTANEOUS at 09:46

## 2022-02-15 RX ADMIN — ONDANSETRON 4 MG: 2 INJECTION INTRAMUSCULAR; INTRAVENOUS at 05:38

## 2022-02-15 RX ADMIN — GABAPENTIN 1200 MG: 600 TABLET, FILM COATED ORAL at 21:15

## 2022-02-15 ASSESSMENT — PAIN SCALES - GENERAL
PAINLEVEL_OUTOF10: 0

## 2022-02-15 ASSESSMENT — ENCOUNTER SYMPTOMS
ABDOMINAL PAIN: 0
EYE REDNESS: 0
VOMITING: 0
NAUSEA: 1
SHORTNESS OF BREATH: 0
EYE DISCHARGE: 0
DIARRHEA: 0
RHINORRHEA: 0
SORE THROAT: 0
COUGH: 0

## 2022-02-15 NOTE — PROGRESS NOTES
Comprehensive Nutrition Assessment    RECOMMENDATIONS:  1. PO Diet: Continue 4CC diet  2. ONS: Start wound healing supplement BID   3. Nutrition Education: provided written DM diet educ in d/c instructions    NUTRITION ASSESSMENT:    Nutritional summary & status: Positive nutrition screen triggered for wounds and wt loss. Noted pt w/ DM ulcers per chart review. No significant wt loss noted per EMR. No PO intakes have been documented yet. Will add wound healing ONS to help promote better wound healing. Will monitor nutritonal status throughout adm.  Admission/PMH: Admit for DM foot infection; PMH:  DMII, HTN, HLD, DFI, neck abscess, L foot amputation    MALNUTRITION ASSESSMENT  Context of Malnutrition: Acute Illness   Malnutrition Status: Insufficient data    NUTRITION DIAGNOSIS   Increased nutrient needs related to increase demand for energy/nutrients as evidenced by wounds    NUTRITION INTERVENTION  Food and/or Nutrient Delivery:  Continue Current Diet,Start Oral Nutrition Supplement  Nutrition Education/Counseling:  Education initiated   Goals:  pt will consistently consume >50% PO intake of meals and ONS offered through adm       Nutrition Monitoring and Evaluation:   Food/Nutrient Intake Outcomes:  Food and Nutrient Intake,Supplement Intake  Physical Signs/Symptoms Outcomes:  Biochemical Data,Weight,Skin     OBJECTIVE DATA: Significant to nutrition assessment  · Nutrition-Focused Physical Findings: no BM recorded yet  · Labs: Reviewed; POCBG   · Meds: Reviewed; insulin  · Wounds: Diabetic Ulcer (foot, toe, R knee)       CURRENT NUTRITION THERAPIES  ADULT DIET; Regular; 4 carb choices (60 gm/meal)  ADULT ORAL NUTRITION SUPPLEMENT; Breakfast, Lunch, Dinner;  Low Calorie/High Protein Oral Supplement     PO Intake: Unable to assess   PO Supplement Intake:Unable to assess  Additional Sources of Calories/IVF:n/a     ANTHROPOMETRICS  Current Height: 6' 2.5\" (189.2 cm)  Current Weight: (!) 315 lb (142.9 kg) Admission weight: (!) 315 lb (142.9 kg)  Ideal Body Weight (IBW): 193 lbs  (88 kg)    Usual Bodyweight  (315-319lb per EMR)   Weight Changes: no significant wt loss noted  BMI: 0    Wt Readings from Last 50 Encounters:   02/14/22 (!) 315 lb (142.9 kg)   01/03/22 (!) 317 lb (143.8 kg)   11/29/21 (!) 319 lb (144.7 kg)   11/18/21 (!) 311 lb (141.1 kg)     COMPARATIVE STANDARDS  Energy (kcal):  7886-0226     Protein (g):  114-158 (1.3-1.8g/kg)       Fluid (ml/day):  2560-1847    The patient will still be monitored per nutrition standards of care. Consult dietitian if nutrition interventions essential to patient care is needed.      Jarred Kelly, [address]  [place]:  [phone]  Office:  [phone]

## 2022-02-15 NOTE — PROGRESS NOTES
Physician Progress Note      Dave MARSHALL #:                  [id number]  :                       1980  ADMIT DATE:       2022 11:51 AM  DISCH DATE:  RESPONDING  PROVIDER #:        Haily Sorto MD          QUERY TEXT:    Pt admitted with right foot cellulitis. Pt noted to have DM type 2. If   possible, please document in progress notes and discharge summary the   relationship, if any, between cellulitis and DM. The medical record reflects the following:  Risk Factors: 35year old male with dm type 2  Clinical Indicators: Per podiatry- Diabetic foot infection, right foot,   Full-thickness ulceration, right lower extremity; Félix Abler II (POA), Cellulitis   right foot, Diabetes mellitus with peripheral neuropathy, History of   amputations;?bilateral lower extremity. Per chart review, last HA1C 21   11.6. Treatment: Podiatry and ID consults. IV cleocin, ertapenem, zyvox and zosyn. Sliding scale insulin, labs, imaging, monitoring, supportive care    Thank you,  [email or web address]  Options provided:  -- Right foot cellulitis associated with Diabetes  -- Right foot cellulitis unrelated to Diabetes  -- Other - I will add my own diagnosis  -- Disagree - Not applicable / Not valid  -- Disagree - Clinically unable to determine / Unknown  -- Refer to Clinical Documentation Reviewer    PROVIDER RESPONSE TEXT:    Right foot cellulitis associated with Diabetes. Query created by:  Anusha Sigala on 2/15/2022 11:27 AM      Electronically signed by:  Haily Sorto MD 2/15/2022 12:07 PM

## 2022-02-15 NOTE — PLAN OF CARE
Problem: Nutrition  Goal: Optimal nutrition therapy  2/15/2022 1146 by Consuelo Dodson, MS, RD, LD  Outcome: Ongoing  Note: Nutrition Problem #1: Increased nutrient needs  Intervention: Food and/or Nutrient Delivery: Continue Current Diet,Start Oral Nutrition Supplement  Nutritional Goals: pt will consistently consume >50% PO intake of meals and ONS offered through adm

## 2022-02-15 NOTE — PROGRESS NOTES
4 Eyes Admission Assessment     I agree as the admission nurse that 2 RN's have performed a thorough Head to Toe Skin Assessment on the patient. ALL assessment sites listed below have been assessed on admission. Areas assessed by both nurses:   [x]   Head, Face, and Ears   [x]   Shoulders, Back, and Chest  [x]   Arms, Elbows, and Hands   [x]   Coccyx, Sacrum, and Ischium  [x]   Legs, Feet, and Heels- R diabetic foot infection, full thickness ulceration; LLE superficial abrasions        Does the Patient have Skin Breakdown?   Yes a wound was noted on the Admission Assessment and an LDA was Initiated documentation include the Cyn-wound, Wound Assessment, Measurements, Dressing Treatment, Drainage, and Color\",       Right forearm, Right Hand, Right Knee, Left Foot/Toes, Right Foot  Andrei Prevention initiated:  Yes   Wound Care Orders initiated:  Yes      [facility] nurse consulted for Pressure Injury (Stage 3,4, Unstageable, DTI, NWPT, and Complex wounds) or Andrei score 18 or lower:  Yes      Nurse 1 eSignature: Electronically signed by Ria Cervantes RN on 2/14/22 at 7:12 PM EST    **SHARE this note so that the co-signing nurse is able to place an eSignature**    Nurse 2 eSignature: Electronically signed by Miroslava Taylor RN on 2/15/22 at 3:26 AM EST

## 2022-02-15 NOTE — CARE COORDINATION
Cm following, pt from home with family support, plans to DC home unsure of DC needs at this time. Pod following daily dressing changes, PT OT evals completed recs DC home no needs.   ID following for ABX recs at [place],  Electronically signed by Yevgeniy Ko RN on 2/15/2022 at 10:01 AM   [phone]

## 2022-02-15 NOTE — ED PROVIDER NOTES
[id number] [facility] RESIDENT NOTE       Date of evaluation: 2/14/2022    Chief Complaint     Foot Problem (pt is diabetic, thinks he's had infection in his right foot for a couple days, -fever)    History of Present Illness     Heidi Acosta is a 35 y.o. 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 Patient endorses chronic wound to R lateral foot x2-3 months that he believes began from a blister. Over the past 2-3 days wound has become increasingly painful and malodorous with grey drainage from center of ulceration. Patient contacted his primary care provider and she instructed him to present to the ED for evaluation by podiatry. On presentation, patient reports that pain from wound is worse w/ weight bearing but cannot describe its quality or severity. Pain is located at site of wound and does not radiate. He is concerned that the wound has grown (has not been checking it every day), and states that the odor has progressively worsened over the past 2 days. No change in other chronic wounds (L toes, R knee). Denies recent fevers; has nausea at baseline 2/2 gastroparesis. Denies recent dyspnea, cough, chest pain, vomiting or diarrhea, abdominal pain, changes in bowel/bladder function. No change in paraesthesias/numbness 2/2 diabetic neuropathy. Has not been checking his blood sugars recently; denies polyuria or polydipsia. Review of Systems     Review of Systems   Constitutional: Negative for activity change, appetite change, chills and fever. HENT: Negative for congestion, rhinorrhea and sore throat. Eyes: Negative for discharge and redness. Respiratory: Negative for cough and shortness of breath.     Cardiovascular: Negative for chest pain, palpitations and leg swelling. Gastrointestinal: Positive for nausea. Negative for abdominal pain, diarrhea and vomiting. Endocrine: Negative for polydipsia and polyuria. Genitourinary: Negative for difficulty urinating, dysuria, frequency, hematuria and urgency. Musculoskeletal: Negative for arthralgias and myalgias. Skin: Positive for wound. Negative for pallor and rash. 3x3cm ulcerated wound to R lateral foot w/ foul odor & grey drainage   Neurological: Positive for numbness. Negative for syncope, weakness and light-headedness. Baseline paraesthesias & numbness 2/2 diabetic polyneuropathy   Psychiatric/Behavioral: Negative for dysphoric mood. The patient is not nervous/anxious. Past Medical, Surgical, Family, and Social History     He has a past medical history of Depression, Diabetes mellitus ([facility] [address] Hyperlipidemia, Hypertension, and Osteoarthritis of both knees. He has a past surgical history that includes knee surgery (Right, 6/19/2013); Toe amputation (Left); Foot surgery (Left, 02/19/2018); Toe amputation (Right, 9/26/2019); and Neck surgery (Left, 11/9/2021). His family history is not on file. He reports that he has never smoked. He has never used smokeless tobacco. He reports that he does not drink alcohol and does not use drugs. Medications     Current Discharge Medication List      CONTINUE these medications which have NOT CHANGED    Details   escitalopram (LEXAPRO) 20 MG tablet Take 10 mg by mouth daily      omeprazole (PRILOSEC) 40 MG delayed release capsule Take 1 capsule (40 mg) by mouth daily 30 minutes before breakfast and dinner.       pioglitazone (ACTOS) 30 MG tablet Take 30 mg by mouth daily      insulin glargine (LANTUS;BASAGLAR) 100 UNIT/ML injection pen Inject 35 Units into the skin 2 times daily  Qty: 5 pen, Refills: 3      insulin lispro, 1 Unit Dial, 100 UNIT/ML SOPN Inject 15 Units into the skin 3 times daily (before meals)  Qty: 1 pen, Refills: 3 empagliflozin (JARDIANCE) 10 MG tablet Take 25 mg by mouth daily       gabapentin (NEURONTIN) 600 MG tablet Take 1,200 mg by mouth 3 times daily. vitamin D (ERGOCALCIFEROL) 1.25 MG (42106 UT) CAPS capsule TAKE 1 CAPSULE BY MOUTH ONE TIME A WEEK      VORTIoxetine HBr (TRINTELLIX) 20 MG TABS tablet Take 20 mg by mouth daily      Cholecalciferol (VITAMIN D3) 2000 units CAPS Take by mouth      atorvastatin (LIPITOR) 40 MG tablet Take 40 mg by mouth daily      buprenorphine-naloxone (SUBOXONE) 8-2 MG SUBL SL tablet Place 1 tablet under the tongue daily. Insulin Pen Needle 31G X 8 MM MISC 1 each by Does not apply route daily  Qty: 100 each, Refills: 3             Allergies     He is allergic to cephalexin. Physical Exam     INITIAL VITALS: BP: (!) 131/96, Temp: 98.4 °F (36.9 °C), Pulse: 60, Resp: 16, SpO2: 99 %   Physical Exam  Constitutional:       General: He is not in acute distress. Appearance: He is obese. He is not ill-appearing or diaphoretic. HENT:      Head: Normocephalic and atraumatic. Nose: Nose normal. No congestion or rhinorrhea. Mouth/Throat:      Mouth: Mucous membranes are moist.      Pharynx: Oropharynx is clear. Eyes:      General: No scleral icterus. Right eye: No discharge. Left eye: No discharge. Extraocular Movements: Extraocular movements intact. Conjunctiva/sclera: Conjunctivae normal.      Pupils: Pupils are equal, round, and reactive to light. Cardiovascular:      Rate and Rhythm: Normal rate and regular rhythm. Pulses: Normal pulses. Heart sounds: Normal heart sounds. No murmur heard. No friction rub. No gallop. Pulmonary:      Effort: Pulmonary effort is normal.      Breath sounds: Normal breath sounds. No wheezing, rhonchi or rales. Comments: Faint crackles at L base  Abdominal:      Palpations: Abdomen is soft. Tenderness: There is no abdominal tenderness. There is no guarding or rebound.       Comments: Obese abdomen   Musculoskeletal:         General: Deformity present. No tenderness or signs of injury. Cervical back: Normal range of motion and neck supple. No rigidity. Right lower leg: No edema. Left lower leg: No edema. Comments: S/p amputation of all R toes, L 4th toe at MTP. FROM and 5/5 strength in all 4 extremities. Skin:     General: Skin is warm and dry. Capillary Refill: Capillary refill takes 2 to 3 seconds. Coloration: Skin is not jaundiced. Findings: Erythema and lesion present. No bruising or rash. Comments: 3x3cm chronic appearing ulcerated wound over R lateral foot w/ grey necrotic center, malodorous discharge and surrounding erythema; no underlying fluctuance. - Large, shallow ulceration over volar aspect of L 1st MTP joint w/ mild surrounding erythema, no discharge  - Superficial ulcerations to dorsal aspect of 3rd-5th toes  - 1cm superficial wounds overlying midline R knee surgical incision, otherwise well healed  - Scattered superficial abrasions over dorsal aspect of bilateral hands   Neurological:      General: No focal deficit present. Mental Status: He is alert and oriented to person, place, and time. Sensory: Sensory deficit present. Motor: No weakness. Comments: Baseline peripheral neuropathy   Psychiatric:         Mood and Affect: Mood normal.         Thought Content: Thought content normal.       DiagnosticResults     RADIOLOGY:  XR FOOT RIGHT (MIN 3 VIEWS)   Final Result   Impression: Prior to dictation of all digits at the level of the metatarsal heads. No acute destructive osseous process. LABS:   Results for orders placed or performed during the hospital encounter of 02/14/22   Culture, Blood 1    Specimen: Blood   Result Value Ref Range    Blood Culture, Routine       No Growth to date. Any change in status will be called.    Culture, Wound    Specimen: Foot   Result Value Ref Range    Gram Stain Result (A) 2+ Gram negative rods  4+ Gram negative diplococci  1+ Gram positive cocci      Organism Beta Strep Group C (A)     WOUND/ABSCESS       Light growth  Susceptibility testing of penicillin and other beta lactams is  not necessary for beta hemolytic Streptococci since resistant  strains have not been identified. (CLSI M100)     CBC Auto Differential   Result Value Ref Range    WBC 10.5 4.0 - 11.0 K/uL    RBC 4.29 4. 20 - 5.90 M/uL    Hemoglobin 10.6 (L) 13.5 - 17.5 g/dL    Hematocrit 33.7 (L) 40.5 - 52.5 %    MCV 78.4 (L) 80.0 - 100.0 fL    MCH 24.6 (L) 26.0 - 34.0 pg    MCHC 31.4 31.0 - 36.0 g/dL    RDW 14.7 12.4 - 15.4 %    Platelets 089 571 - 181 K/uL    MPV 8.0 5.0 - 10.5 fL    Neutrophils % 75.8 %    Lymphocytes % 17.7 %    Monocytes % 5.4 %    Eosinophils % 0.8 %    Basophils % 0.3 %    Neutrophils Absolute 8.0 (H) 1.7 - 7.7 K/uL    Lymphocytes Absolute 1.9 1.0 - 5.1 K/uL    Monocytes Absolute 0.6 0.0 - 1.3 K/uL    Eosinophils Absolute 0.1 0.0 - 0.6 K/uL    Basophils Absolute 0.0 0.0 - 0.2 K/uL   Basic Metabolic Panel w/ Reflex to MG   Result Value Ref Range    Sodium 138 136 - 145 mmol/L    Potassium reflex Magnesium 4.1 3.5 - 5.1 mmol/L    Chloride 105 99 - 110 mmol/L    CO2 24 21 - 32 mmol/L    Anion Gap 9 3 - 16    Glucose 97 70 - 99 mg/dL    BUN 20 7 - 20 mg/dL    CREATININE 0.6 (L) 0.9 - 1.3 mg/dL    GFR Non-African American >60 >60    GFR African American >60 >60    Calcium 9.1 8.3 - 10.6 mg/dL   Sedimentation Rate   Result Value Ref Range    Sed Rate 42 (H) 0 - 15 mm/Hr   C-Reactive Protein   Result Value Ref Range    CRP 22.5 (H) 0.0 - 5.1 mg/L   Lactic Acid, Plasma   Result Value Ref Range    Lactic Acid 0.8 0.4 - 2.0 mmol/L   Blood Gas, Venous   Result Value Ref Range    pH, Dell 7.381 7.350 - 7.450    pCO2, Dell 41.6 41.0 - 51.0 mmHg    pO2, Dell 132.0 (H) 25.0 - 40.0 mmHg    HCO3, Venous 24.6 24.0 - 28.0 mmol/L    Base Excess, Dell -0.5 -2.0 - 3.0 mmol/L    O2 Sat, Dell 99 Not established % Carboxyhemoglobin 0.7 0.0 - 1.5 %    MetHgb, Dell 0.2 0.0 - 1.5 %    TC02 (Calc), Dell 26 mmol/L    Hemoglobin, Dell, Reduced 0.00 %   Hemoglobin A1C   Result Value Ref Range    Hemoglobin A1C 8.1 See comment %    eAG 185.8 mg/dL   Prealbumin   Result Value Ref Range    Prealbumin 16.3 (L) 20.0 - 40.0 mg/dL   Basic Metabolic Panel w/ Reflex to MG   Result Value Ref Range    Sodium 141 136 - 145 mmol/L    Potassium reflex Magnesium 3.8 3.5 - 5.1 mmol/L    Chloride 107 99 - 110 mmol/L    CO2 26 21 - 32 mmol/L    Anion Gap 8 3 - 16    Glucose 96 70 - 99 mg/dL    BUN 18 7 - 20 mg/dL    CREATININE 0.7 (L) 0.9 - 1.3 mg/dL    GFR Non-African American >60 >60    GFR African American >60 >60    Calcium 8.7 8.3 - 10.6 mg/dL   CBC auto differential   Result Value Ref Range    WBC 7.8 4.0 - 11.0 K/uL    RBC 3.95 (L) 4.20 - 5.90 M/uL    Hemoglobin 9.9 (L) 13.5 - 17.5 g/dL    Hematocrit 31.0 (L) 40.5 - 52.5 %    MCV 78.7 (L) 80.0 - 100.0 fL    MCH 25.2 (L) 26.0 - 34.0 pg    MCHC 32.0 31.0 - 36.0 g/dL    RDW 14.8 12.4 - 15.4 %    Platelets 238 223 - 258 K/uL    MPV 7.9 5.0 - 10.5 fL    Neutrophils % 61.9 %    Lymphocytes % 26.1 %    Monocytes % 7.8 %    Eosinophils % 3.5 %    Basophils % 0.7 %    Neutrophils Absolute 4.8 1.7 - 7.7 K/uL    Lymphocytes Absolute 2.0 1.0 - 5.1 K/uL    Monocytes Absolute 0.6 0.0 - 1.3 K/uL    Eosinophils Absolute 0.3 0.0 - 0.6 K/uL    Basophils Absolute 0.1 0.0 - 0.2 K/uL   POCT Glucose   Result Value Ref Range    POC Glucose 100 (H) 70 - 99 mg/dl    Performed on ACCU-CHEK    POCT Glucose   Result Value Ref Range    POC Glucose 93 70 - 99 mg/dl    Performed on ACCU-CHEK    POCT Glucose   Result Value Ref Range    POC Glucose 95 70 - 99 mg/dl    Performed on ACCU-CHEK        RECENT VITALS:  BP: (!) 157/77, Temp: 98.6 °F (37 °C), Pulse: 62,Resp: 18, SpO2: 100 %     Procedures     None performed    ED Course     Nursing Notes, Past Medical Hx, Past Surgical Hx, Social Hx, Allergies, and Family Hx were reviewed.     The patient was given the followingmedications:  Orders Placed This Encounter   Medications    DISCONTD: nitroGLYCERIN 50 mg in dextrose 5% 250 mL infusion    DISCONTD: ertapenem (INVanz) 1000 mg IVPB minibag    lactated ringers bolus    DISCONTD: ciprofloxacin (CIPRO) tablet 500 mg     Order Specific Question:   Antimicrobial Indications     Answer:   Skin and Soft Tissue Infection    DISCONTD: clindamycin (CLEOCIN) capsule 300 mg     Order Specific Question:   Antimicrobial Indications     Answer:   Skin and Soft Tissue Infection    ciprofloxacin (CIPRO) 500 MG tablet     Sig: Take 1 tablet by mouth 2 times daily for 14 days     Dispense:  28 tablet     Refill:  0    clindamycin (CLEOCIN) 300 MG capsule     Sig: Take 1 capsule by mouth 4 times daily for 14 days     Dispense:  56 capsule     Refill:  0    DISCONTD: ciprofloxacin (CIPRO) IVPB 400 mg     Order Specific Question:   Antimicrobial Indications     Answer:   Skin and Soft Tissue Infection    clindamycin (CLEOCIN) 600 mg in dextrose 5 % 50 mL IVPB     Order Specific Question:   Antimicrobial Indications     Answer:   Skin and Soft Tissue Infection    DISCONTD: sodium chloride flush 0.9 % injection 5-40 mL    DISCONTD: ciprofloxacin (CIPRO) IVPB 400 mg     Order Specific Question:   Antimicrobial Indications     Answer:   OB/Gyn Infection    DISCONTD: clindamycin (CLEOCIN) 600 mg in dextrose 5 % 50 mL IVPB     Order Specific Question:   Antimicrobial Indications     Answer:   Skin and Soft Tissue Infection    atorvastatin (LIPITOR) tablet 40 mg    DISCONTD: escitalopram (LEXAPRO) tablet 10 mg    gabapentin (NEURONTIN) tablet 1,200 mg    insulin glargine (LANTUS;BASAGLAR) injection pen 20 Units    pantoprazole (PROTONIX) tablet 40 mg    insulin lispro (1 Unit Dial) 0-6 Units    insulin lispro (1 Unit Dial) 0-3 Units    sodium chloride flush 0.9 % injection 5-40 mL    sodium chloride flush 0.9 % injection 5-40 mL    0.9 % sodium chloride infusion    enoxaparin (LOVENOX) injection 40 mg    OR Linked Order Group     ondansetron (ZOFRAN-ODT) disintegrating tablet 4 mg     ondansetron (ZOFRAN) injection 4 mg    polyethylene glycol (GLYCOLAX) packet 17 g    OR Linked Order Group     acetaminophen (TYLENOL) tablet 650 mg     acetaminophen (TYLENOL) suppository 650 mg    glucose (GLUTOSE) 40 % oral gel 15 g    DISCONTD: dextrose 50 % IV solution    glucagon (rDNA) injection 1 mg    dextrose 5 % solution    buprenorphine-naloxone (SUBOXONE) 2-0.5 MG SL tablet 4 tablet    dextrose bolus (hypoglycemia) 10% 250 mL    diphenhydrAMINE (BENADRYL) injection 12.5 mg    collagenase ointment    piperacillin-tazobactam (ZOSYN) 4,500 mg in dextrose 5 % 100 mL IVPB extended infusion (mini-bag)     Order Specific Question:   Antimicrobial Indications     Answer:   Skin and Soft Tissue Infection    DISCONTD: linezolid (ZYVOX) IVPB 600 mg     Order Specific Question:   Antimicrobial Indications     Answer:   Skin and Soft Tissue Infection    piperacillin-tazobactam (ZOSYN) 4,500 mg in dextrose 5 % 100 mL IVPB (mini-bag)     Order Specific Question:   Antimicrobial Indications     Answer: Other     Order Specific Question:   Other Abx Indication     Answer:   diabetic foot infection    VORTIoxetine (TRINTELLIX) tablet 20 mg    escitalopram (LEXAPRO) tablet 10 mg       CONSULTS:  IP CONSULT TO PODIATRY  IP CONSULT TO INFECTIOUS DISEASES  IP CONSULT TO PODIATRY  IP CONSULT TO HOSPITALIST    MEDICAL DECISION MAKING / ASSESSMENT / Esa Poster is a 35 y.o. male w/ PMH and HPI as described above, presenting for infection of a diabetic foot ulcer. On presentation, patient is NAD and HDS, endorses R foot pain and malodorous discharge. Physical exam notable for multiple chronic wounds, of which only the ulcer over the lateral R foot appears acutely infected (surrounding erythema, necrotic grey material in center).   No surrounding induration or fluctuance to raise concern for underlying abscess or surrounding cellulitis. R foot x-ray obtained w/o evidence of underlying osteomyelitis. Work up w/ VBG w/o pH derangement; CBC w/ baseline anemia, WBC 10.5; BMP; w/o electrolyte derangement, anion gap or ANANDA; CRP 22.5, ESR 42. Low concern for systemic infection but patient has a history of hematogenous dissemination of MDRO so will obtain blood cultures prior to initiating abx. Podiatry consulted and recommending admission for inpatient abx and possible OR washout tomorrow AM.  Patient most recently treated w/ outpatient ertapenem but pharmacy unable to resume this while he is an inpatient. Podiatry recommending ciprofloxacin/clindamycin IV pending speciation of wound cultures, initiated in ED. Discussed need for inpatient admission to continue IV antibiotics and patient is in agreement with this plan. Case discussed w/ hospitalist and they will admit the patient for continued evaluation and management. This patient was also evaluated by the attending physician. All care plans werediscussed and agreed upon. Clinical Impression     1.  Type 2 diabetes mellitus with right diabetic foot infection [facility])        Disposition       DISPOSITION Admitted 02/14/2022 06:06:42 PM        Yolande Mariscal MD  Resident  02/15/22 1800

## 2022-02-15 NOTE — PROGRESS NOTES
Podiatric Surgery Daily Progress Note      Admit Date: 2/14/2022                           Code:Full Code    Patient seen and examined, labs and records reviewed    Subjective:     Patient seen and examined at bedside this AM.  Patient denies any overnight acute event. Patient denies f/c/n/v/sob/cp. Patient still having a little tenderness to the right foot, but improved since yesterday. Objective     BP 93/63   Pulse 59   Temp 98 °F (36.7 °C)   Resp 16   Ht 6' 2.5\" (1.892 m)   Wt (!) 315 lb (142.9 kg)   SpO2 98%   BMI 39.90 kg/m²      I/O:    Intake/Output Summary (Last 24 hours) at 2/15/2022 0724  Last data filed at 2/15/2022 0542  Gross per 24 hour   Intake 795.77 ml   Output 1025 ml   Net -229.23 ml              Wt Readings from Last 3 Encounters:   02/14/22 (!) 315 lb (142.9 kg)   01/03/22 (!) 317 lb (143.8 kg)   11/29/21 (!) 319 lb (144.7 kg)       LABS:    Recent Labs     02/14/22  1436 02/15/22  0531   WBC 10.5 7.8   HGB 10.6* 9.9*   HCT 33.7* 31.0*    353        Recent Labs     02/15/22  0531      K 3.8      CO2 26   BUN 18   CREATININE 0.7*      No results for input(s): PROT, INR, APTT in the last 72 hours. LOWER EXTREMITY EXAMINATION    Dressing to right LE intact. No strikethrough drainage noted to the external dressing. Minimal serosanguineous strikethrough drainage noted to the internal dressing layer. VASCULAR:   - DP and PT pulses are non palpable  b/l. - Upon hand-held Doppler biphasic signals noted to DP and PT pulses B/L LE.  - CFT is brisk to the right TMA stump site. -CFT is brisk to the remaining distal digits on the left foot.   - Skin temperature is warm to lukewarm from proximal to distal with focal calor noted lateral aspect right foot -slowly improving.  - Nonpitting edema noted R>L LE.   - No pain with calf compression b/l.     NEUROLOGIC:   - Gross and epicritic sensation is diminished b/l.   - Protective sensation is diminished at all pedal sites b/l.     DERMATOLOGIC:   Dermatological changes noted B/L LE     Right lower extremity:     #1  -Full-thickness circumferential ulceration noted to the lateral aspect of the fifth metatarsal.  -Wound measures approximately 4.0 x 3.3 x 0.5.  -Wound base central necrotic tissue with surrounding granular and slightly fibrotic.  -Wound does probe to bone, but is covered by soft tissue.  -No tunneling or tracking noted. -No fluctuance, crepitus, malodor or drainage noted.  -Acute periwound infection with erythema noted -slowly improving.     #2   -Superficial abrasion noted distal medial plantar aspect of TMA stump.  -Base dermal tissue.  -Wound does not probe to bone, tunnel, or track.  -No fluctuance, crepitus, malodor, or drainage noted.  -No periwound erythema or acute signs of infection noted.       Left lower extremity:  -Multiple superficial abrasions noted to the third, fourth, and fifth digit.  -Dried sanguinous crust noted to the fifth digit 2/2 hitting bedpost.  -Wound base is dermal to epithelialized tissue noted.  -Wound does not probe to bone, tunnel, or track.  -No fluctuance, crepitus, malodor, or drainage noted. -Superficial abrasions stable and without acute signs of infection noted.          MUSCULOSKELETAL:   - Muscle strength is 5/5 for all pedal groups tested. - No pain with palpation of the foot or ankle b/l. - Ankle joint ROM is decreased in dorsiflexion with the knee extended.   -Transmetatarsal amputation noted to RLE.  - Partial first digit and second digit amputation to the LLE. IMAGING:  Right foot x-rays 2/14/2022      Impression   Impression: Prior to dictation of all digits at the level of the metatarsal heads. No acute destructive osseous process      ASSESSMENT/PLAN  Diabetic foot infection, right foot  Full-thickness ulceration, right lower extremity;  [place] II (POA)  Cellulitis right foot  Multiple superficial abrasions; bilateral lower extremity  Diabetes mellitus with

## 2022-02-15 NOTE — PROGRESS NOTES
RLE wrapped/CDI. VSS on Rm air. Denies pain. Bed alarm off per pt request- up steady gait. A+Ox4. IV abx given per orders. Tolerating meals.

## 2022-02-15 NOTE — PROGRESS NOTES
Pt A&O x4, VSS. Pt denies pain. Unable to assess RLE DFI d/t dressing. Multiple nonhealing superficial abrasions noted on LLE and RLE knee. Intermittent infusions of IV zosyn and IV linezolid infusing during shift. Pt c/o mild nausea in AM, given prn dose of IV zofran. Pt denies any other needs at this time. Bed alarm on and bed in lowest position. Call light and bedside table within reach.

## 2022-02-15 NOTE — PROGRESS NOTES
Occupational Therapy   Occupational Therapy Initial Assessment, Treatment and D/c Note   Date: 2/15/2022   Patient Name: Shabana Pinzon  MRN: [id number]     : 1980    Date of Service: 2/15/2022    Discharge Recommendations:Jeffrey Freddrick Cooks scored a 24/24 on the  [address] form. At this time, no further OT is recommended upon discharge. Recommend patient returns to prior setting with prior services. OT Equipment Recommendations  Equipment Needed: No    Assessment   Assessment: Pt from home with mother assisting with IADLs. Pt demo functioning at current baseline level. Pt is independent with ADLs / functional mobility -heel WB- no acute OT needs , no DME . Will sign off from OT services. Pt in agreement with plan of care. Decision Making: Low Complexity  OT Education: OT Role;Plan of Care;Precautions  Patient Education: verb understanding  No Skilled OT: No OT goals identified; Independent with ADL's;Independent with functional mobility  REQUIRES OT FOLLOW UP: No  Activity Tolerance  Activity Tolerance: Patient Tolerated treatment well  Activity Tolerance: No ROGERS. Safety Devices  Safety Devices in place: Yes  Type of devices: Call light within reach;Nurse notified; Left in bed;Bed alarm in place (pt denies sitting up in bed)           Patient Diagnosis(es): The encounter diagnosis was Type 2 diabetes mellitus with right diabetic foot infection ([address] has a past medical history of Depression, Diabetes mellitus ([address] Hyperlipidemia, Hypertension, and Osteoarthritis of both knees. has a past surgical history that includes knee surgery (Right, 2013); Toe amputation (Left); Foot surgery (Left, 2018); Toe amputation (Right, 2019); and Neck surgery (Left, 2021). Restrictions  Position Activity Restriction  Other position/activity restrictions: up as tolerated, Heel WB RLE    Subjective   General  Chart Reviewed:  Yes  Additional Pertinent Hx: Admit  with R foot infection         podiatry consult                                                     PMHX:DM,HTN, OA, depression, multiple foot sx 2/2 and R TMA , Left mandible abscess  Family / Caregiver Present: No  Diagnosis: R foot infection  Subjective  Subjective: \" You're ian I didn't kick you out\"   pt in bed agreeable for OOB/OT eval and tx  Patient Currently in Pain: Denies    Social/Functional History  Social/Functional History  Lives With: Alone  Type of Home: Apartment  Home Layout: One level  Home Access: Stairs to enter with rails  Entrance Stairs - Number of Steps: 28  Entrance Stairs - Rails: Both  Bathroom Shower/Tub: Tub/Shower unit  Bathroom Toilet: Standard  Home Equipment: Cane,Rolling walker (uses when needed)  Receives Help From: Family  ADL Assistance: Independent  Homemaking Assistance: Independent (has grocery's delivered, mom picks up medicines)  Homemaking Responsibilities: Yes  Ambulation Assistance: Independent  Transfer Assistance: Independent  Active : No  Patient's  Info: mom drives  Occupation: On disability       Objective  Treatment included functional transfer training, ADL's and pt. education. Orientation  Overall Orientation Status: Within Functional Limits     Balance  Sitting Balance: Independent  Standing Balance: Independent  Standing Balance  Time: 3 mins x 2 and 2 mins x  2  Activity: functional transfers /stance at sink/ functional mobility in room / hallway / bathroom  Functional Mobility  Functional - Mobility Device: No device  Activity: To/from bathroom; To/From therapy gym  Functional Mobility Comments: Pt demo Heel WB -- pt reports podiatry is going to get him a wedge shoe for heel WB  Toilet Transfers  Toilet - Technique: Ambulating  Equipment Used: Standard toilet  Toilet Transfer: Modified independent; Independent  Toilet Transfers Comments: pt demo up / down - with and without rail  Tub Transfers  Tub Transfers Comments: pt reports has tub only as shower is broken.   Pt edu on keeing RLE dry unless cleared by podiatry  ADL  Feeding: Independent  Grooming: Independent  LE Dressing: Modified independent ;Setup  Toileting: Modified independent  (simulated - demo pants /up / down)  Tone RUE  RUE Tone: Normotonic  Tone LUE  LUE Tone: Normotonic  Coordination  Movements Are Fluid And Coordinated: Yes    Vision - Basic Assessment  Prior Vision: Other (add comment) (to watch tv)  Cognition  Overall Cognitive Status: [facility]  Cognition Comment: pt flat at times    LUE AROM (degrees)  LUE AROM : WFL  Left Hand AROM (degrees)  Left Hand AROM: WFL  RUE AROM (degrees)  RUE AROM : WFL  Right Hand AROM (degrees)  Right Hand AROM: WFL  LUE Strength  Gross LUE Strength: WFL  L Hand General: 4/5  RUE Strength  Gross RUE Strength: WFL  R Hand General: 4/5     Hand Dominance  Hand Dominance: Right     Plan D/c Acute OT services      AM-PAC Score  AM-PAC Inpatient Daily Activity Raw Score: 24 (02/15/22 0950)  AM-PAC Inpatient ADL T-Scale Score : 57.54 (02/15/22 0950)  ADL Inpatient CMS 0-100% Score: 0 (02/15/22 0950)  ADL Inpatient CMS G-Code Modifier : CH (02/15/22 0950)    Therapy Time   Individual Concurrent Group Co-treatment   Time In 0825         Time Out 0850         Minutes 25              Timed Code Treatment Minutes:   10 mins     Total Treatment Minutes:  25 mins       Nicolasa Hatchet, OT

## 2022-02-15 NOTE — CONSULTS
AMPUTATION Right 9/26/2019    RIGHT HALLUX AMPUTATION WITH POSSIBLE 1ST RAY AMPUTATION performed by Cinda Kumar DPM at [address]       Current Medications:    Outpatient Medications Marked as Taking for the 2/14/22 encounter [facility] Encounter)   Medication Sig Dispense Refill    escitalopram (LEXAPRO) 20 MG tablet Take 10 mg by mouth daily      omeprazole (PRILOSEC) 40 MG delayed release capsule Take 1 capsule (40 mg) by mouth daily 30 minutes before breakfast and dinner.  pioglitazone (ACTOS) 30 MG tablet Take 30 mg by mouth daily      ciprofloxacin (CIPRO) 500 MG tablet Take 1 tablet by mouth 2 times daily for 14 days 28 tablet 0    clindamycin (CLEOCIN) 300 MG capsule Take 1 capsule by mouth 4 times daily for 14 days 56 capsule 0    insulin glargine (LANTUS;BASAGLAR) 100 UNIT/ML injection pen Inject 35 Units into the skin 2 times daily 5 pen 3    insulin lispro, 1 Unit Dial, 100 UNIT/ML SOPN Inject 15 Units into the skin 3 times daily (before meals) 1 pen 3    empagliflozin (JARDIANCE) 10 MG tablet Take 25 mg by mouth daily       gabapentin (NEURONTIN) 600 MG tablet Take 1,200 mg by mouth 3 times daily.  vitamin D (ERGOCALCIFEROL) 1.25 MG (83696 UT) CAPS capsule TAKE 1 CAPSULE BY MOUTH ONE TIME A WEEK      VORTIoxetine HBr (TRINTELLIX) 20 MG TABS tablet Take 20 mg by mouth daily      Cholecalciferol (VITAMIN D3) 2000 units CAPS Take by mouth      atorvastatin (LIPITOR) 40 MG tablet Take 40 mg by mouth daily      buprenorphine-naloxone (SUBOXONE) 8-2 MG SUBL SL tablet Place 1 tablet under the tongue daily.           Allergies:  Cephalexin    Immunizations :   Immunization History   Administered Date(s) Administered    COVID-19, Pfizer Purple top, DILUTE for use, 12+ yrs, 30mcg/0.3mL dose 03/20/2021, 04/10/2021, 10/10/2021    Hepatitis B 09/04/2015, 10/04/2015, 03/11/2016    Influenza, Quadv, IM, PF (6 mo and older Fluzone, Flulaval, Fluarix, and 3 yrs and older Afluria) 09/27/2019  Pneumococcal Polysaccharide ([id number]) 01/02/2013    Tdap (Boostrix, Adacel) 01/02/2013         Social History:    Social History     Tobacco Use    Smoking status: Never Smoker    Smokeless tobacco: Never Used   Vaping Use    Vaping Use: Never used   Substance Use Topics    Alcohol use: No    Drug use: No     Types: Opiates      Comment: in recovery     Social History     Tobacco Use   Smoking Status Never Smoker   Smokeless Tobacco Never Used      Family History : no DVT no COPD       REVIEW OF SYSTEMS:    Constitutional:  negative for fevers, chills, night sweats  Eyes:  negative for blurred vision, eye discharge, visual disturbance   HEENT:  negative for hearing loss, ear drainage,nasal congestion  Respiratory:  negative for cough, shortness of breath or hemoptysis   Cardiovascular:  negative for chest pain, palpitations, syncope  Gastrointestinal:  negative for nausea, vomiting, diarrhea, constipation, abdominal pain  Genitourinary:  negative for frequency, dysuria, urinary incontinence, hematuria  Hematologic/Lymphatic:  negative for easy bruising, bleeding and lymphadenopathy  Allergic/Immunologic:  negative for recurrent infections, angioedema, anaphylaxis   Endocrine:  negative for weight changes, polyuria, polydipsia and polyphagia  Musculoskeletal:  Rt foot infection + decreased range of motion  Integumentary: No rashes, skin lesions  Neurological:  negative for headaches, slurred speech, unilateral weakness  Psychiatric: negative for hallucinations,confusion,agitation.      PHYSICAL EXAM:      Vitals:    /82   Pulse 70   Temp 99 °F (37.2 °C) (Oral)   Resp 18   Ht 6' 2.5\" (1.892 m)   Wt (!) 315 lb (142.9 kg)   SpO2 98%   BMI 39.90 kg/m²     General Appearance: alert,in no acute distress,+ pallor, no icterus  Obesity ++   Skin: warm and dry, no rash or erythema  Head: normocephalic and atraumatic  Eyes: pupils equal, round, and reactive to light, conjunctivae normal  ENT: tympanic membrane, external ear and ear canal normal bilaterally, nose without deformity, nasal mucosa and turbinates normal without polyps  Neck: supple and non-tender without mass, no thyromegaly  no cervical lymphadenopathy  Pulmonary/Chest: clear to auscultation bilaterally- no wheezes, rales or rhonchi, normal air movement, no respiratory distress  Cardiovascular: normal rate, regular rhythm, normal S1 and S2, no murmurs, rubs, clicks, or gallops, no carotid bruits  Abdomen: soft, non-tender, non-distended, normal bowel sounds, no masses or organomegaly  Extremities: no cyanosis, clubbing or edema  Musculoskeletal: normal range of motion, no joint swelling, deformity or tenderness  Integumentary: No rashes, no abnormal skin lesions, no petechiae  Neurologic: reflexes normal and symmetric, no cranial nerve deficit  Psych:  Orientation, sensorium, mood normal   Lines: IV  Rt foot ace wraps+ previous TMA,  wound images reviewed            DATA:    CBC:   Lab Results   Component Value Date    WBC 7.8 02/15/2022    HGB 9.9 (L) 02/15/2022    HCT 31.0 (L) 02/15/2022    MCV 78.7 (L) 02/15/2022     02/15/2022     RENAL:   Lab Results   Component Value Date    CREATININE 0.7 (L) 02/15/2022    BUN 18 02/15/2022     02/15/2022    K 3.8 02/15/2022     02/15/2022    CO2 26 02/15/2022     SED RATE:   Lab Results   Component Value Date    SEDRATE 42 02/14/2022     CK: No results found for: CKTOTAL  CRP:   Lab Results   Component Value Date    CRP 22.5 02/14/2022     Hepatic Function Panel:   Lab Results   Component Value Date    ALKPHOS 96 11/08/2021    ALT 10 11/08/2021    AST 8 11/08/2021    PROT 7.9 11/08/2021    BILITOT 0.3 11/08/2021    LABALBU 3.3 11/13/2021     UA:No results found for: Claire Profit, GLUCOSEU, BILIRUBINUR, KETUA, SPECGRAV, BLOODU, PHUR, PROTEINU, UROBILINOGEN, NITRU, LEUKOCYTESUR, LABMICR, URINETYPE   Urine Microscopic: No results found for: LABCAST, BACTERIA, COMU, HYALCAST, WBCUA, RBCUA, EPIU  Urine Reflex to Culture: No results found for: URRFLXCULT      MICRO: cultures reviewed and updated by me       Procedure Component Value Units Date/Time   Culture, Wound [[id number]] (Abnormal) Collected: 02/14/22 1435   Order Status: Completed Specimen: Foot Updated: 02/15/22 1131    Gram Stain Result 2+ Gram negative rods   4+ Gram negative diplococci   1+ Gram positive cocci    Abnormal     Organism Beta Strep Group C Abnormal     WOUND/ABSCESS --    Light growth   Susceptibility testing of penicillin and other beta lactams is   not necessary for beta hemolytic Streptococci since resistant   strains have not been identified. (CLSI M100)    Narrative:     ORDER#: [id number]                          ORDERED BY: James Fuentes   SOURCE: Foot footRight                     COLLECTED:  02/14/22 14:35   ANTIBIOTICS AT NITESH. :                      RECEIVED :  02/14/22 16:48   Performed at:   [facility] [address] [place] [address]   Phone [phone]   Culture, Blood 2 [[id number]] Collected: 02/14/22 1632   Order Status: Sent Specimen: Blood Updated: 02/14/22 2213   Culture, Blood 1 [[id number]] Collected: 02/14/22 1436   Order Status: Sent Specimen: Blood Updated: 02/14/22 2213     Hemoglobin A1C  Hemoglobin A1c  Collected: 11/09/21 0016   Result status: Final   Resulting lab: [address] [place] [address] LAB   Reference range: See comment %   Value: 11.6   Comment: Comment:   Diagnosis of Diabetes: > or = 6.5%   Increased risk of diabetes (Prediabetes): 5.7-6.4%   Glycemic Control: Nonpregnant Adults: <7.0%                     Pregnant: <6.0%        Blood Culture:   Lab Results   Component Value Date    BC No growth after 5 days of incubation. 02/18/2018    BLOODCULT2 No growth after 5 days of incubation. 02/18/2018       Viral Culture:    No results found for: COVID19  Urine Culture: No results for input(s): LABURIN in the last 72 hours.     Scheduled Meds:   VORTIoxetine HBr  20 mg Oral Daily    escitalopram  10 mg Oral Daily    atorvastatin  40 mg Oral Daily    gabapentin  1,200 mg Oral TID    insulin glargine  20 Units SubCUTAneous BID    pantoprazole  40 mg Oral QAM AC    insulin lispro  0-6 Units SubCUTAneous TID WC    insulin lispro  0-3 Units SubCUTAneous Nightly    sodium chloride flush  5-40 mL IntraVENous 2 times per day    enoxaparin  40 mg SubCUTAneous Daily    buprenorphine-naloxone  4 tablet SubLINGual Daily    collagenase   Topical Daily    piperacillin-tazobactam  4,500 mg IntraVENous Q8H    linezolid  600 mg IntraVENous Q12H       Continuous Infusions:   sodium chloride      dextrose         PRN Meds:  sodium chloride flush, sodium chloride, ondansetron **OR** ondansetron, polyethylene glycol, acetaminophen **OR** acetaminophen, glucose, glucagon (rDNA), dextrose, dextrose bolus (hypoglycemia)    Imaging:   XR FOOT RIGHT (MIN 3 VIEWS)   Final Result   Impression: Prior to dictation of all digits at the level of the metatarsal heads. No acute destructive osseous process. All pertinent images and reports for the current Hospitalization were reviewed by me.     IMPRESSION:    Patient Active Problem List   Diagnosis    Gangrene of toe of left foot (HCC)    Type 2 diabetes mellitus ([address]    Essential hypertension    Obesity, Class III, BMI 40-49.9 (morbid obesity) ([address]    Diabetic foot infection ([address]    Toe osteomyelitis, left (HCC)    Toe necrosis (HCC)    Diabetic polyneuropathy ([address]    Facial abscess    Neck abscess     Recurrent Rt diabetic foot infection   Previous TMA for infections  Rt foot lateral ulcer+  Diabetic Neuropathy   BMI 39   DM poor control \  Rt foot ulcer   Wound cx with Group C strep  X ray -ve    Given the foot exam and prior infection history will cont IV abx as in patient for now and await final cx to guide abx therapy       Labs, Microbiology, Radiology and pertinent results from current hospitalization and care every where were reviewed by me as a part of the consultation. PLAN :  1. Cont IV Zosy x 3.375 mg x q 8 HRS  2. D/c Linezolid  3. Esr, crp  4. Control DM   5. Diabetic education  6. Cont local care  7. May choose oral abx if he does well    Discussed with patient/Family and Nursing   Risk of Complications/Morbidity: High      · Illness(es)/ Infection present that pose threat to bodily function. · There is potential for severe exacerbation of infection/side effects of treatment. · Therapy requires intensive monitoring for antimicrobial agent toxicity and adjustments. Thanks for allowing me to participate in your patient's care please call me with any questions or concerns.     Dr. Dorota Hair MD  [address] [place] [address] Physician  Phone: [phone]   Fax : [phone]

## 2022-02-15 NOTE — PROGRESS NOTES
Physical Therapy    Facility/Department: [address] [place] [address]  Initial Assessment/ Discharge Summary     NAME: Thedore Gowers  : 1980  MRN: [id number]    Date of Service: 2/15/2022    Discharge Recommendations: Thedore Gowers scored a 23/24 on the AM-PAC short mobility form. At this time, no further PT is recommended upon discharge due to demonstration of baseline function. Recommend patient returns to prior setting with prior services. PT Equipment Recommendations  Equipment Needed: No    Assessment   Assessment: pt is a 34 yo male presenting with R foot infection indep at baseline demonstrating steady gait with no AD and no further needs for acute PT. Prognosis: Good  Decision Making: Low Complexity  PT Education: Goals;PT Role;Plan of Care; Functional Mobility Training;Weight-bearing Education  REQUIRES PT FOLLOW UP: No  Activity Tolerance  Activity Tolerance: Patient Tolerated treatment well       Patient Diagnosis(es): The encounter diagnosis was Type 2 diabetes mellitus with right diabetic foot infection ([facility] has a past medical history of Depression, Diabetes mellitus ([facility] Hyperlipidemia, Hypertension, and Osteoarthritis of both knees. has a past surgical history that includes knee surgery (Right, 2013); Toe amputation (Left); Foot surgery (Left, 2018); Toe amputation (Right, 2019); and Neck surgery (Left, 2021). Restrictions  Position Activity Restriction  Other position/activity restrictions: up as tolerated, Heel WB RLE  Vision/Hearing  Vision: Impaired  Vision Exceptions: Wears glasses for distance  Hearing: Within functional limits     Subjective  General  Chart Reviewed:  Yes  Additional Pertinent Hx: pt is a 34 yo male presenting with R foot infection  Referring Practitioner: Bre Louise MD  Diagnosis: diabetic foot infection  Follows Commands: Within Functional Limits  Subjective  Subjective: pt supine in bed and agreeable to PT, denies pain  Pain Screening  Patient Currently in Pain: Denies  Vital Signs  Patient Currently in Pain: Denies       Orientation  Orientation  Overall Orientation Status: Within Normal Limits  Social/Functional History  Social/Functional History  Lives With: Alone  Type of Home: Apartment  Home Layout: One level  Home Access: Stairs to enter with rails  Entrance Stairs - Number of Steps: 28  Entrance Stairs - Rails: Both  Bathroom Shower/Tub: Tub/Shower unit  Bathroom Toilet: Standard  Home Equipment: Cane,Rolling walker (uses when needed)  Receives Help From: Family  ADL Assistance: Independent  Homemaking Assistance: Independent (has grocerys delivered, mom picks up medicines)  Homemaking Responsibilities: Yes  Ambulation Assistance: Independent  Transfer Assistance: Independent  Active : No  Patient's  Info: mom drives  Occupation: On disability  Cognition        Objective             Strength RLE  Strength RLE: WFL  Strength LLE  Strength LLE: WFL        Bed mobility  Supine to Sit: Independent  Sit to Supine: Independent  Scooting: Independent  Transfers  Sit to Stand: Modified independent  Stand to sit: Modified independent  Comment: with no AD from EOB and toilet  Ambulation  Ambulation?: Yes  WB Status: HWB on RLE  Ambulation 1  Surface: level tile  Device: No Device  Assistance: Modified Independent  Quality of Gait: steady gait with no LOB, able to maintain HWB  Distance: 15'+ 50'  Stairs/Curb  Stairs?: No     Balance  Posture: Good  Sitting - Static: Good  Sitting - Dynamic: Good  Standing - Static: Good  Standing - Dynamic: Good        Plan   Plan  Times per week: initial dc  Safety Devices  Type of devices: Bed alarm in place,Left in bed,Call light within reach,Gait belt,Nurse notified    G-Code       OutComes Score                                                  AM-PAC Score  AM-PAC Inpatient Mobility Raw Score : 23 (02/15/22 0941)  AM-PAC Inpatient T-Scale Score : 56.93 (02/15/22 0941)  Mobility Inpatient CMS 0-100% Score: 11.2 (02/15/22 0941)  Mobility Inpatient CMS G-Code Modifier : CI (02/15/22 0941)          Goals  Short term goals  Time Frame for Short term goals: no acute PT goals to be addressed       Therapy Time   Individual Concurrent Group Co-treatment   Time In 0825         Time Out 0850         Minutes 25             Timed Code Treatment Minutes:  10 min     Total Treatment Minutes:  25 min       Kailyn Mckeon, PT

## 2022-02-15 NOTE — PROGRESS NOTES
HOSPITALISTS PROGRESS NOTE    2/15/2022 1:07 PM        Name: Geno Bradford . Admitted: 2/14/2022  Primary Care Provider: Tg Ray. Zain Ware MD, MD (Tel: [phone])      Problem List  Active Problems:    Diabetic foot infection [facility])  Resolved Problems:    * No resolved hospital problems. *       Assessment & Plan:   Diabetic foot infection  ID on consult, on antibiotics, Zyvox and Zosyn, cultures growing beta strep group C, probably might not need Zyvox, will await ID for final input,    Diabetes mellitus  Insulin sliding scale Lantus,    On chronic Suboxone    IV Access: Peripheral  Mack: No  Diet: ADULT DIET; Regular; 4 carb choices (60 gm/meal)  ADULT ORAL NUTRITION SUPPLEMENT; Breakfast, Lunch, Dinner; Low Calorie/High Protein Oral Supplement  ADULT ORAL NUTRITION SUPPLEMENT; Dinner, Breakfast; Wound Healing Oral Supplement  Code:Full Code  DVT PPX Lovenox  Disposition monitor in the hospital      Brief Course:        CC: Foot infection    Subjective:  .   Patient mentioned that he was noticing foul-smelling discharge from the foot, mention that his blood sugars are better controlled now blood pressure is also better controlled at home,    Reviewed interval ancillary notes    Current Medications  VORTIoxetine (TRINTELLIX) tablet 20 mg, Daily  escitalopram (LEXAPRO) tablet 10 mg, Daily  atorvastatin (LIPITOR) tablet 40 mg, Daily  gabapentin (NEURONTIN) tablet 1,200 mg, TID  insulin glargine (LANTUS;BASAGLAR) injection pen 20 Units, BID  pantoprazole (PROTONIX) tablet 40 mg, QAM AC  insulin lispro (1 Unit Dial) 0-6 Units, TID WC  insulin lispro (1 Unit Dial) 0-3 Units, Nightly  sodium chloride flush 0.9 % injection 5-40 mL, 2 times per day  sodium chloride flush 0.9 % injection 5-40 mL, PRN  0.9 % sodium chloride infusion, PRN  enoxaparin (LOVENOX) injection 40 mg, Daily  ondansetron (ZOFRAN-ODT) disintegrating tablet 4 mg, Q8H PRN Or  ondansetron (ZOFRAN) injection 4 mg, Q6H PRN  polyethylene glycol (GLYCOLAX) packet 17 g, Daily PRN  acetaminophen (TYLENOL) tablet 650 mg, Q6H PRN   Or  acetaminophen (TYLENOL) suppository 650 mg, Q6H PRN  glucose (GLUTOSE) 40 % oral gel 15 g, PRN  glucagon (rDNA) injection 1 mg, PRN  dextrose 5 % solution, PRN  buprenorphine-naloxone (SUBOXONE) 2-0.5 MG SL tablet 4 tablet, Daily  dextrose bolus (hypoglycemia) 10% 250 mL, PRN  collagenase ointment, Daily  piperacillin-tazobactam (ZOSYN) 4,500 mg in dextrose 5 % 100 mL IVPB extended infusion (mini-bag), Q8H  linezolid (ZYVOX) IVPB 600 mg, Q12H        Objective:  /82   Pulse 70   Temp 99 °F (37.2 °C) (Oral)   Resp 18   Ht 6' 2.5\" (1.892 m)   Wt (!) 315 lb (142.9 kg)   SpO2 98%   BMI 39.90 kg/m²     Intake/Output Summary (Last 24 hours) at 2/15/2022 1307  Last data filed at 2/15/2022 0542  Gross per 24 hour   Intake 795.77 ml   Output 1025 ml   Net -229.23 ml      Wt Readings from Last 3 Encounters:   02/14/22 (!) 315 lb (142.9 kg)   01/03/22 (!) 317 lb (143.8 kg)   11/29/21 (!) 319 lb (144.7 kg)   rt foot dressing  Wound pics reviewed from the podiatry chart  General appearance:  Appears comfortable  Eyes: Sclera clear. Pupils equal.  ENT: Moist oral mucosa. Trachea midline, no adenopathy. Cardiovascular: Regular rhythm, normal S1, S2. No murmur. No edema in lower extremities  Respiratory: Not using accessory muscles. Good inspiratory effort. Clear to auscultation bilaterally, no wheeze or crackles. GI: Abdomen soft, no tenderness, not distended, normal bowel sounds  Musculoskeletal: No cyanosis in digits, neck supple  Neurology: CN 2-12 grossly intact. No speech or motor deficits  Psych: Normal affect.  Alert and oriented in time, place and person      Labs and Tests:  CBC:   Recent Labs     02/14/22  1436 02/15/22  0531   WBC 10.5 7.8   HGB 10.6* 9.9*    353     BMP:    Recent Labs     02/14/22  1436 02/15/22  0531    141   K 4.1 3.8  107   CO2 24 26   BUN 20 18   CREATININE 0.6* 0.7*   GLUCOSE 97 96     Hepatic: No results for input(s): AST, ALT, ALB, BILITOT, ALKPHOS in the last 72 hours. XR FOOT RIGHT (MIN 3 VIEWS)   Final Result   Impression: Prior to dictation of all digits at the level of the metatarsal heads. No acute destructive osseous process.             Cleo Fisher MD   2/15/2022 1:07 PM

## 2022-02-15 NOTE — DISCHARGE INSTR - COC
Continuity of Care Form    Patient Name: [place] Duvall   :  1980  MRN:  [id number]    Admit date:  2022  Discharge date:  ***    Code Status Order: Full Code   Advance Directives:      Admitting Physician:  Marlene Charles DO  PCP: Jeff Noonan.  Joanna Germain MD, MD    Discharging Nurse: [facility] Unit/Room#: 3058/6583-77  Discharging Unit Phone Number: ***    Emergency Contact:   Extended Emergency Contact Information  Primary Emergency Contact: Wilver Harden  Address: [address] [place] [address] Phone: [phone]  Mobile Phone: [phone]  Relation: Parent  Secondary Emergency Contact: None,Per Pt  Relation: Other    Past Surgical History:  Past Surgical History:   Procedure Laterality Date    FOOT SURGERY Left 2018     INCISION AND DRAINAGE WITH HALLUX AMPUTATION LEFT FOOT    KNEE SURGERY Right 2013    NECK SURGERY Left 2021    LEFT FACIAL ABSCESS INCISION AND DRAINAGE performed by Jackelyn Diego MD at [place] [address] Left     2nd toe    TOE AMPUTATION Right 2019    RIGHT HALLUX AMPUTATION WITH POSSIBLE 1ST RAY AMPUTATION performed by Valerio Ortiz DPM at [facility] [address]       Immunization History:   Immunization History   Administered Date(s) Administered    COVID-19, Pfizer Purple top, DILUTE for use, 12+ yrs, 30mcg/0.3mL dose 2021, 04/10/2021, 10/10/2021    Hepatitis B 2015, 10/04/2015, 2016    Influenza, Quadv, IM, PF (6 mo and older Fluzone, Flulaval, Fluarix, and 3 yrs and older Afluria) 2019    Pneumococcal Polysaccharide (Edqnhoufh40) 2013    Tdap (Boostrix, Adacel) 2013       Active Problems:  Patient Active Problem List   Diagnosis Code    Gangrene of toe of left foot ([facility] [address] I96    Type 2 diabetes mellitus ([facility] E11.9    Essential hypertension I10    Obesity, Class III, BMI 40-49.9 (morbid obesity) ([facility] E66.01    Diabetic foot infection ([facility] [address] E11.628, L08.9    Toe osteomyelitis, left (HCC) M86.9    Toe necrosis ([address] I96    Diabetic polyneuropathy ([facility]) E11.42    Facial abscess L02.01    Neck abscess L02.11       Isolation/Infection:   Isolation            No Isolation          Patient Infection Status       None to display            Nurse Assessment:  Last Vital Signs: /70   Pulse 60   Temp 98.4 °F (36.9 °C) (Oral)   Resp 16   Ht 6' 2.5\" (1.892 m)   Wt (!) 315 lb (142.9 kg)   SpO2 99%   BMI 39.90 kg/m²     Last documented pain score (0-10 scale): Pain Level: 0  Last Weight:   Wt Readings from Last 1 Encounters:   02/14/22 (!) 315 lb (142.9 kg)     Mental Status:  {IP PT MENTAL STATUS:20030}    IV Access:  { MUNIRA IV ACCESS:202518705}    Nursing Mobility/ADLs:  Walking   {[facility] DME SAOP:619693612}  Transfer  {[facility] DME IMFR:792721347}  Bathing  {[facility] DME GEDF:19783}  Dressing  {[facility] DME MCZE:733267732}  Toileting  {[facility] DME OLFD:722824803}  Feeding  {[facility] DME LZWJ:709049175}  Med Admin  {[facility] DME NXLF:674765067}  Med Delivery   {[facility] MED Delivery:301332621}    Wound Care Documentation and Therapy:  Wound 09/24/19 Toe (Comment  which one) Right necrotic toe (Active)   Number of days: 514       Wound 09/24/19 Knee Right (Active)   Wound Assessment Pink/red 02/15/22 0303   Drainage Amount None 02/15/22 0303   Odor None 02/15/22 0303   Number of days: 489       Wound 09/24/19 Foot Left (Active)   Dressing Status Clean;Dry; Intact 02/15/22 0303   Wound Assessment Other (Comment) 02/15/22 0303   Drainage Amount None 02/15/22 0303   Odor None 02/15/22 0303   Number of days: 874        Elimination:  Continence:    Bowel: {YES / MF:44144}  Bladder: {YES / GQ:31568}  Urinary Catheter: {Urinary Catheter:851661172}   Colostomy/Ileostomy/Ileal Conduit: {YES / TW:35231}       Date of Last BM: ***    Intake/Output Summary (Last 24 hours) at 2/15/2022 0928  Last data filed at 2/15/2022 0542  Gross per 24 hour   Intake 795.77 ml   Output 1025 ml   Net -229.23 ml     I/O last 3 completed shifts:   In: 795.8 [IV Piggyback:795.8]  Out: 1 [Urine:1025]    Safety Concerns:     [facility] Safety Concerns:981641687}    Impairments/Disabilities:      [facility] Impairments/Disabilities:292848470}    Nutrition Therapy:  Current Nutrition Therapy:   [address] [place] [address] Diet List:140259845}    Routes of Feeding: {[facility] DME Other Feedings:227856753}  Liquids: {Slp liquid thickness:73220}  Daily Fluid Restriction: {[facility] DME Yes amt example:654948545}  Last Modified Barium Swallow with Video (Video Swallowing Test): {Done Not Done NWXL:756703964}    Treatments at the Time of Hospital Discharge:   Respiratory Treatments: ***  Oxygen Therapy:  {Therapy; copd oxygen:79348}  Ventilator:    { CC Vent MGGX:023064222}    Rehab Therapies: {THERAPEUTIC INTERVENTION:9724713402}  Weight Bearing Status/Restrictions: [address] [place] [address] Weight Bearin}  Other Medical Equipment (for information only, NOT a DME order):  {EQUIPMENT:350210551}  Other Treatments: ***    Patient's personal belongings (please select all that are sent with patient):  {[facility] DME Belongings:024699880}    RN SIGNATURE:  {Esignature:439966287}    CASE MANAGEMENT/SOCIAL WORK SECTION    Inpatient Status Date: ***    Readmission Risk Assessment Score:  Readmission Risk              Risk of Unplanned Readmission:  14           Discharging to Facility/ Agency   Name:   Address:  Phone:  Fax:    Dialysis Facility (if applicable)   Name:  Address:  Dialysis Schedule:  Phone:  Fax:    / signature: {Esignature:969855905}    PHYSICIAN SECTION    Prognosis: {Prognosis:9476595812}    Condition at Discharge: [facility] Patient Condition:213256326}    Rehab Potential (if transferring to Rehab): {Prognosis:9679468251}    Recommended Labs or Other Treatments After Discharge: ***    PODIATRY INSTRUCTIONS:  Please perform wound care daily consisting of:  Left Lower Extremity  -Apply Betadine to the wounds on remaining toes  -Please apply daily until healed    Right Lower Extremity  -Apply Santyl to the wounds on the outside part of the foot  -Cover Santyl with saline soaked gauze  -Apply gauze from the toes to below the knee  -Apply Kerlix from the toes to below the knee, wrap gently  -Apply Ace Bandage 4 inch from toes to ankle with compression  -Apply Ace Bandage 6 inch from ankle to below the knee with compression    Please follow up with the [facility] podiatry clinic within 1 week of hospital discharge. Weightbearing as tolerated to bilateral lower extremities. Physician Certification: I certify the above information and transfer of Jeanette Chi  is necessary for the continuing treatment of the diagnosis listed and that he requires {Admit to Appropriate Level of Care:05695} for {GREATER/LESS:667812262} 30 days.      Update Admission H&P: {CHP DME Changes in XKHCZ:157319566}    PHYSICIAN SIGNATURE:  {Esignature:623808639}

## 2022-02-15 NOTE — PROGRESS NOTES
Pharmacy Note - Extended Infusion Beta-Lactam Adjustment    Piperacillin/Tazobactam 3.375g IV q8 hours ordered for treatment of diabetic foot infection. Per [facility] Extended Infusion Beta-Lactam Policy, dosing will be changed to Zosyn 4.5g IV q8 hours extended infusion. Estimated Creatinine Clearance: Estimated Creatinine Clearance: 241 mL/min (A) (based on SCr of 0.6 mg/dL (L)). BMI: Body mass index is 39.9 kg/m². Rationale for Adjustment: Agent demonstrates time-dependent effect on bacterial eradication. Extended-infusion dosing strategy aims to enhance microbiologic and clinical efficacy. Pharmacy will continue to monitor cultures and sensitivities (where available) and adjust dose as necessary. Please call with any questions.     Heidi Lundy RPh 2/14/2022, 11:03 PM

## 2022-02-16 LAB
ANION GAP SERPL CALCULATED.3IONS-SCNC: 6 MMOL/L (ref 3–16)
BASOPHILS ABSOLUTE: 0.1 K/UL (ref 0–0.2)
BASOPHILS RELATIVE PERCENT: 0.8 %
BUN BLDV-MCNC: 17 MG/DL (ref 7–20)
CALCIUM SERPL-MCNC: 8.8 MG/DL (ref 8.3–10.6)
CHLORIDE BLD-SCNC: 106 MMOL/L (ref 99–110)
CO2: 27 MMOL/L (ref 21–32)
CREAT SERPL-MCNC: 0.7 MG/DL (ref 0.9–1.3)
EOSINOPHILS ABSOLUTE: 0.3 K/UL (ref 0–0.6)
EOSINOPHILS RELATIVE PERCENT: 4.2 %
GFR AFRICAN AMERICAN: >60
GFR NON-AFRICAN AMERICAN: >60
GLUCOSE BLD-MCNC: 148 MG/DL (ref 70–99)
GLUCOSE BLD-MCNC: 155 MG/DL (ref 70–99)
GLUCOSE BLD-MCNC: 167 MG/DL (ref 70–99)
GLUCOSE BLD-MCNC: 84 MG/DL (ref 70–99)
GLUCOSE BLD-MCNC: 90 MG/DL (ref 70–99)
HCT VFR BLD CALC: 32.6 % (ref 40.5–52.5)
HEMOGLOBIN: 10.5 G/DL (ref 13.5–17.5)
LYMPHOCYTES ABSOLUTE: 1.9 K/UL (ref 1–5.1)
LYMPHOCYTES RELATIVE PERCENT: 30 %
MCH RBC QN AUTO: 24.9 PG (ref 26–34)
MCHC RBC AUTO-ENTMCNC: 32.1 G/DL (ref 31–36)
MCV RBC AUTO: 77.4 FL (ref 80–100)
MONOCYTES ABSOLUTE: 0.5 K/UL (ref 0–1.3)
MONOCYTES RELATIVE PERCENT: 7.9 %
NEUTROPHILS ABSOLUTE: 3.6 K/UL (ref 1.7–7.7)
NEUTROPHILS RELATIVE PERCENT: 57.1 %
PDW BLD-RTO: 14.6 % (ref 12.4–15.4)
PERFORMED ON: ABNORMAL
PERFORMED ON: NORMAL
PLATELET # BLD: 378 K/UL (ref 135–450)
PMV BLD AUTO: 7.8 FL (ref 5–10.5)
POTASSIUM REFLEX MAGNESIUM: 3.8 MMOL/L (ref 3.5–5.1)
RBC # BLD: 4.21 M/UL (ref 4.2–5.9)
SODIUM BLD-SCNC: 139 MMOL/L (ref 136–145)
WBC # BLD: 6.2 K/UL (ref 4–11)

## 2022-02-16 PROCEDURE — 80048 BASIC METABOLIC PNL TOTAL CA: CPT

## 2022-02-16 PROCEDURE — 6360000002 HC RX W HCPCS: Performed by: INTERNAL MEDICINE

## 2022-02-16 PROCEDURE — 6370000000 HC RX 637 (ALT 250 FOR IP): Performed by: INTERNAL MEDICINE

## 2022-02-16 PROCEDURE — 99231 SBSQ HOSP IP/OBS SF/LOW 25: CPT | Performed by: INTERNAL MEDICINE

## 2022-02-16 PROCEDURE — 85025 COMPLETE CBC W/AUTO DIFF WBC: CPT

## 2022-02-16 PROCEDURE — 36415 COLL VENOUS BLD VENIPUNCTURE: CPT

## 2022-02-16 PROCEDURE — 2580000003 HC RX 258: Performed by: INTERNAL MEDICINE

## 2022-02-16 PROCEDURE — 1200000000 HC SEMI PRIVATE

## 2022-02-16 RX ADMIN — INSULIN GLARGINE 20 UNITS: 100 INJECTION, SOLUTION SUBCUTANEOUS at 08:36

## 2022-02-16 RX ADMIN — ENOXAPARIN SODIUM 40 MG: 100 INJECTION SUBCUTANEOUS at 18:44

## 2022-02-16 RX ADMIN — COLLAGENASE SANTYL: 250 OINTMENT TOPICAL at 08:34

## 2022-02-16 RX ADMIN — PIPERACILLIN SODIUM AND TAZOBACTAM SODIUM 4500 MG: 4; .5 INJECTION, POWDER, LYOPHILIZED, FOR SOLUTION INTRAVENOUS at 07:05

## 2022-02-16 RX ADMIN — GABAPENTIN 1200 MG: 600 TABLET, FILM COATED ORAL at 15:55

## 2022-02-16 RX ADMIN — INSULIN LISPRO 1 UNITS: 100 INJECTION, SOLUTION INTRAVENOUS; SUBCUTANEOUS at 17:39

## 2022-02-16 RX ADMIN — PANTOPRAZOLE SODIUM 40 MG: 40 TABLET, DELAYED RELEASE ORAL at 07:05

## 2022-02-16 RX ADMIN — INSULIN LISPRO 1 UNITS: 100 INJECTION, SOLUTION INTRAVENOUS; SUBCUTANEOUS at 20:31

## 2022-02-16 RX ADMIN — GABAPENTIN 1200 MG: 600 TABLET, FILM COATED ORAL at 20:56

## 2022-02-16 RX ADMIN — VORTIOXETINE 20 MG: 10 TABLET, FILM COATED ORAL at 07:54

## 2022-02-16 RX ADMIN — INSULIN GLARGINE 20 UNITS: 100 INJECTION, SOLUTION SUBCUTANEOUS at 20:32

## 2022-02-16 RX ADMIN — INSULIN LISPRO 1 UNITS: 100 INJECTION, SOLUTION INTRAVENOUS; SUBCUTANEOUS at 13:07

## 2022-02-16 RX ADMIN — ATORVASTATIN CALCIUM 40 MG: 40 TABLET, FILM COATED ORAL at 07:49

## 2022-02-16 RX ADMIN — ESCITALOPRAM OXALATE 10 MG: 10 TABLET ORAL at 07:49

## 2022-02-16 RX ADMIN — DAPTOMYCIN 450 MG: 500 INJECTION, POWDER, LYOPHILIZED, FOR SOLUTION INTRAVENOUS at 11:44

## 2022-02-16 RX ADMIN — GABAPENTIN 1200 MG: 600 TABLET, FILM COATED ORAL at 07:49

## 2022-02-16 RX ADMIN — BUPRENORPHINE AND NALOXONE 4 TABLET: 2; .5 TABLET SUBLINGUAL at 07:59

## 2022-02-16 RX ADMIN — ONDANSETRON 4 MG: 2 INJECTION INTRAMUSCULAR; INTRAVENOUS at 07:50

## 2022-02-16 RX ADMIN — SODIUM CHLORIDE, PRESERVATIVE FREE 10 ML: 5 INJECTION INTRAVENOUS at 20:56

## 2022-02-16 ASSESSMENT — PAIN SCALES - GENERAL
PAINLEVEL_OUTOF10: 0

## 2022-02-16 NOTE — PROGRESS NOTES
Podiatric Surgery Daily Progress Note      Admit Date: 2/14/2022                           Code:Full Code    Patient seen and examined, labs and records reviewed    Subjective:     Patient seen and examined at bedside this AM.  Patient denies any overnight acute events. Patient denies f/c/n/v/sob/cp. Patient is concerned his  podiatrist is leaving and he wants to follow up in our clinic. Objective     BP (!) 200/138 Comment: notified RN  Pulse 58   Temp 97.7 °F (36.5 °C) (Oral)   Resp 12   Ht 6' 2.5\" (1.892 m)   Wt (!) 315 lb (142.9 kg)   SpO2 99%   BMI 39.90 kg/m²      I/O:    Intake/Output Summary (Last 24 hours) at 2/16/2022 0754  Last data filed at 2/15/2022 0824  Gross per 24 hour   Intake 222 ml   Output --   Net 222 ml              Wt Readings from Last 3 Encounters:   02/14/22 (!) 315 lb (142.9 kg)   01/03/22 (!) 317 lb (143.8 kg)   11/29/21 (!) 319 lb (144.7 kg)       LABS:    Recent Labs     02/15/22  0531 02/16/22  0520   WBC 7.8 6.2   HGB 9.9* 10.5*   HCT 31.0* 32.6*    378        Recent Labs     02/16/22  0521      K 3.8      CO2 27   BUN 17   CREATININE 0.7*      No results for input(s): PROT, INR, APTT in the last 72 hours. LOWER EXTREMITY EXAMINATION    Dressing to right LE intact. No strikethrough drainage noted to the external dressing. Minimal serosanguineous strikethrough drainage noted to the internal dressing layer. VASCULAR:   - DP and PT pulses are non palpable b/l LE.   - Upon hand-held Doppler biphasic signals noted to DP and PT pulses B/L LE.  - CFT is brisk to the right TMA stump site. CFT is brisk to the remaining distal digits on the left foot. - Skin temperature is warm to warm from proximal to distal with focal calor noted to the lateral aspect right foot - slowly improving clinically each day.   - Non-pitting edema noted R>L LE.   - No pain with calf compression b/l.     NEUROLOGIC:   - Gross and epicritic sensation is diminished b/l.   - Protective sensation is diminished at all pedal sites b/l.     DERMATOLOGIC:   Dermatological changes noted B/L LE     Right lower extremity:     #1  -Full-thickness circumferential ulceration noted to the lateral aspect of the fifth metatarsal tma site.  -Wound measures approximately 4.0 x 3.3 x 0.5 cm.  -Wound base is a combination with central fibrotic tissue with surrounding granular tissue.  -Wound does not probe to bone.  -No tunneling or tracking noted. -No fluctuance, crepitus, malodor or drainage noted.  -Acute periwound infection with erythema noted -slowly improving clinically.     #2   -Superficial abrasion noted to the distal medial plantar aspect of TMA stump at the 1st metatarsal.  -Wound base is dermal tissue.  -Wound does not probe to bone, tunnel, or track.  -No fluctuance, crepitus, malodor, or drainage noted.  -No periwound erythema or acute signs of infection noted.       Left lower extremity:  -Multiple superficial abrasions noted to the third, fourth, and fifth digit.  -Dried sanguinous crust noted to the fifth digit 2/2 hitting bedpost.  -Wound base is dermal to epithelialized tissue noted.  -Wound does not probe to bone, tunnel, or track.  -No fluctuance, crepitus, malodor, or drainage noted. -Superficial abrasions stable and without acute signs of infection noted.       MUSCULOSKELETAL:   - Muscle strength is 5/5 for all pedal groups tested. - No pain with palpation of the foot or ankle b/l. - Ankle joint ROM is decreased in dorsiflexion with the knee extended.   -Transmetatarsal amputation noted to RLE.  - Partial first digit and second digit amputation to the LLE. IMAGING:  Right foot x-rays 2/14/2022      Impression   Impression: Prior to dictation of all digits at the level of the metatarsal heads. No acute destructive osseous process      ASSESSMENT/PLAN  - Cellulitis, right foot - improving clinically each day  - Full-thickness ulceration, right lower extremity;  [place] (POA)  - Multiple superficial abrasions; bilateral lower extremity (POA)  - Diabetes mellitus type II with peripheral neuropathy  - History of amputations; bilateral lower extremity      -HTN otherwise VSS, no leukocytosis noted (WBC 6.2)  -HbA1c 8.1  -prealbumin; 16.3 nutrient supplements ordered  -X-ray right foot images reviewed, impression noted above  -Wound culture obtained right lateral foot; Beta Strep Group C  -Infectious disease consulted, recommendations appreciated. -Right lower extremity dressed with Santyl, Betadine, DSD, and Ace bandage.  -Left lower extremity dressed with Betadine paint to remaining digits.  -Post-op shoe to be worn to the right foot at all times of ambulation.  -Heel weightbearing to right LE and full weightbearing to left lower extremity  -Lengthy discussion with patient regarding the importance of extremity elevation and edema control, patient voiced understanding       DISPO: Full-thickness ulceration with superimposed cellulitis to the right lower extremity. No surgical intervention warranted from podiatric standpoint, will continue with local wound care while in house. Patient is to follow up with [facility] podiatry clinic within 1 week of hospital discharge. OK for discharge pending ID recommendation and IM medical clearance.     Patient was seen and evaluated at the bedside with Dr. Gayla Emanuel DPM.    Vianca Leija DPM  Podiatric Resident PGY3  Pager [phone] or Shruthi  2/16/2022, 8:01 AM

## 2022-02-16 NOTE — PROGRESS NOTES
Podiatric Surgery Daily Progress Note      Admit Date: 2/14/2022                           Code:Full Code    Patient seen and examined, labs and records reviewed    Subjective:     Patient seen and examined at bedside this AM.  Patient denies any overnight acute events. Patient denies f/c/n/v/sob/cp. Objective     /76   Pulse 58   Temp 98.4 °F (36.9 °C) (Oral)   Resp 16   Ht 6' 2.5\" (1.892 m)   Wt (!) 315 lb (142.9 kg)   SpO2 98%   BMI 39.90 kg/m²      I/O:    Intake/Output Summary (Last 24 hours) at 2/17/2022 0822  Last data filed at 2/17/2022 4123  Gross per 24 hour   Intake 1330 ml   Output 1050 ml   Net 280 ml              Wt Readings from Last 3 Encounters:   02/14/22 (!) 315 lb (142.9 kg)   01/03/22 (!) 317 lb (143.8 kg)   11/29/21 (!) 319 lb (144.7 kg)       LABS:    Recent Labs     02/16/22  0520 02/17/22  0524   WBC 6.2 6.6   HGB 10.5* 10.8*   HCT 32.6* 33.9*    348        Recent Labs     02/17/22  0524      K 4.6      CO2 31   BUN 23*   CREATININE 0.8*      No results for input(s): PROT, INR, APTT in the last 72 hours. LOWER EXTREMITY EXAMINATION    Dressing to right LE intact. No strikethrough drainage noted to the external dressing. Minimal serosanguineous strikethrough drainage noted to the internal dressing layer. VASCULAR:   DP and PT pulses are non palpable b/l LE. Upon hand-held Doppler biphasic signals noted to DP and PT pulses B/L LE. CFT is brisk to the right TMA stump site. CFT is brisk to the remaining distal digits on the left foot. Skin temperature is warm to warm from proximal to distal with focal calor noted to the lateral aspect right foot - slowly improving clinically each day. Non-pitting edema noted R>L LE. No pain with calf compression b/l.     NEUROLOGIC:   Gross and epicritic sensation is diminished b/l.  Protective sensation is diminished at all pedal sites b/l.     DERMATOLOGIC:   Dermatological changes noted B/L LE     Right lower extremity:     #1  Full-thickness circumferential ulceration noted to the lateral aspect of the fifth metatarsal tma site. Wound measures approximately 4.0 x 3.3 x 0.5 cm. Wound base is a combination with central fibrotic tissue with surrounding granular tissue. Wound does not probe to bone. No tunneling or tracking noted. No fluctuance, crepitus, malodor or drainage noted. Acute periwound infection with erythema noted -slowly improving clinically.     #2   Superficial abrasion noted to the distal medial plantar aspect of TMA stump at the 1st metatarsal. Wound base is dermal tissue. Wound does not probe to bone, tunnel, or track. No fluctuance, crepitus, malodor, or drainage noted. No periwound erythema or acute signs of infection noted.       Left lower extremity:  Multiple superficial abrasions noted to the third, fourth, and fifth digit. Dried sanguinous crust noted to the fifth digit 2/2 hitting bedpost. Wound base is dermal to epithelialized tissue noted. Wound does not probe to bone, tunnel, or track. No fluctuance, crepitus, malodor, or drainage noted. Superficial abrasions stable and without acute signs of infection noted.       MUSCULOSKELETAL:   Muscle strength is 5/5 for all pedal groups tested. No pain with palpation of the foot or ankle b/l. Ankle joint ROM is decreased in dorsiflexion with the knee extended. Transmetatarsal amputation noted to RLE. Partial first digit and second digit amputation to the LLE. IMAGING:  Right foot x-rays 2/14/2022      Impression   Impression: Prior to dictation of all digits at the level of the metatarsal heads. No acute destructive osseous process      ASSESSMENT/PLAN  - Cellulitis, right foot - improving clinically each day  - Full-thickness ulceration, right lower extremity;  [place] II (POA)  - Multiple superficial abrasions; bilateral lower extremity (POA)  - Diabetes mellitus type II with peripheral neuropathy  - History of amputations; bilateral lower extremity      -VSS, no leukocytosis noted  -HbA1c 8.1%  -Prealbumin; 16.3 nutrient supplements ordered  -X-ray right foot images reviewed, impression noted above  -Wound culture obtained right lateral foot: Beta Strep Group C and MRSA  -Infectious disease following, antibiotic recommendations appreciated  -Right lower extremity dressed with Santyl, Betadine, DSD, and Ace bandage  -Left lower extremity dressed with Betadine paint to remaining digits.  -Post-op shoe to be worn to the right foot at all times of ambulation.  -Heel weightbearing to right LE and full weightbearing to left lower extremity  -Lengthy discussion with patient regarding the importance of extremity elevation and edema control, patient voiced understanding       DISPO: Full-thickness ulceration with superimposed cellulitis to the right lower extremity. No surgical intervention warranted from podiatric standpoint, will continue with local wound care while in house. Patient is to follow up with [facility] podiatry clinic within 1 week of hospital discharge. OK for discharge pending final ID recommendations and IM medical clearance.     Patient was seen and evaluated at the bedside with Dr. Faith Dick DPM.    Jonathan Gutiérrez DPM  02/17/22  8:22 AM

## 2022-02-16 NOTE — PROGRESS NOTES
HOSPITALISTS PROGRESS NOTE    2/16/2022 11:11 AM        Name: Victor Hugo Berumen . Admitted: 2/14/2022  Primary Care Provider: Nain Benz MD, MD (Tel: [phone])      Problem List  Active Problems:    Diabetic foot infection [facility])  Resolved Problems:    * No resolved hospital problems. *       Assessment & Plan:   Diabetic foot infection  ID on consult, on antibiotics, Zyvox and Zosyn,x, will await ID for final input, cultures growing  strep group C and MRSA, final antibiotic recommendation as per ID    Diabetes mellitus  Insulin sliding scale Lantus,    Slightly elevated blood pressure  Patient used to be on antihypertensives in the past but not on it recently, will monitor if for persistently elevated will consider adding antihypertensive on discharge    On chronic Suboxone    IV Access: Peripheral  Mack: No  Diet: ADULT DIET; Regular; 4 carb choices (60 gm/meal)  ADULT ORAL NUTRITION SUPPLEMENT; Breakfast, Lunch, Dinner; Low Calorie/High Protein Oral Supplement  ADULT ORAL NUTRITION SUPPLEMENT; Dinner, Breakfast; Wound Healing Oral Supplement  Code:Full Code  DVT PPX Lovenox  Disposition once okay with ID    Brief Course:        CC: Foot infection    Subjective:  .   Patient is feeling better, slightly elevated blood pressure in the morning, blood sugars well controlled, denies any other issues, wound culture results are reviewed,    Reviewed interval ancillary notes    Current Medications  DAPTOmycin (CUBICIN) 450 mg in sodium chloride 0.9 % 50 mL IVPB, Q24H  VORTIoxetine (TRINTELLIX) tablet 20 mg, Daily  [Held by provider] escitalopram (LEXAPRO) tablet 10 mg, Daily  atorvastatin (LIPITOR) tablet 40 mg, Daily  gabapentin (NEURONTIN) tablet 1,200 mg, TID  insulin glargine (LANTUS;BASAGLAR) injection pen 20 Units, BID  pantoprazole (PROTONIX) tablet 40 mg, QAM AC  insulin lispro (1 Unit Dial) 0-6 Units, TID WC  insulin lispro (1 Unit Dial) 0-3 Units, Nightly  sodium chloride flush 0.9 % injection 5-40 mL, 2 times per day  sodium chloride flush 0.9 % injection 5-40 mL, PRN  0.9 % sodium chloride infusion, PRN  enoxaparin (LOVENOX) injection 40 mg, Daily  ondansetron (ZOFRAN-ODT) disintegrating tablet 4 mg, Q8H PRN   Or  ondansetron (ZOFRAN) injection 4 mg, Q6H PRN  polyethylene glycol (GLYCOLAX) packet 17 g, Daily PRN  acetaminophen (TYLENOL) tablet 650 mg, Q6H PRN   Or  acetaminophen (TYLENOL) suppository 650 mg, Q6H PRN  glucose (GLUTOSE) 40 % oral gel 15 g, PRN  glucagon (rDNA) injection 1 mg, PRN  dextrose 5 % solution, PRN  buprenorphine-naloxone (SUBOXONE) 2-0.5 MG SL tablet 4 tablet, Daily  dextrose bolus (hypoglycemia) 10% 250 mL, PRN  collagenase ointment, Daily        Objective:  /71   Pulse 68   Temp 98.5 °F (36.9 °C) (Oral)   Resp 16   Ht 6' 2.5\" (1.892 m)   Wt (!) 315 lb (142.9 kg)   SpO2 96%   BMI 39.90 kg/m²   No intake or output data in the 24 hours ending 02/16/22 1111   Wt Readings from Last 3 Encounters:   02/14/22 (!) 315 lb (142.9 kg)   01/03/22 (!) 317 lb (143.8 kg)   11/29/21 (!) 319 lb (144.7 kg)   rt foot dressing  Wound pics reviewed from the podiatry chart  General appearance:  Appears comfortable  Eyes: Sclera clear. Pupils equal.  ENT: Moist oral mucosa. Trachea midline, no adenopathy. Cardiovascular: Regular rhythm, normal S1, S2. No murmur. No edema in lower extremities  Respiratory: Not using accessory muscles. Good inspiratory effort. Clear to auscultation bilaterally, no wheeze or crackles. GI: Abdomen soft, no tenderness, not distended, normal bowel sounds  Musculoskeletal: No cyanosis in digits, neck supple  Neurology: CN 2-12 grossly intact. No speech or motor deficits  Psych: Normal affect.  Alert and oriented in time, place and person      Labs and Tests:  CBC:   Recent Labs     02/14/22  1436 02/15/22  0531 02/16/22  0520   WBC 10.5 7.8 6.2   HGB 10.6* 9.9* 10.5*    353 378 BMP:    Recent Labs     02/14/22  1436 02/15/22  0531 02/16/22  0521    141 139   K 4.1 3.8 3.8    107 106   CO2 24 26 27   BUN 20 18 17   CREATININE 0.6* 0.7* 0.7*   GLUCOSE 97 96 90     Hepatic: No results for input(s): AST, ALT, ALB, BILITOT, ALKPHOS in the last 72 hours. XR FOOT RIGHT (MIN 3 VIEWS)   Final Result   Impression: Prior to dictation of all digits at the level of the metatarsal heads. No acute destructive osseous process.             Trinity Montoya MD   2/16/2022 11:11 AM  No

## 2022-02-16 NOTE — CARE COORDINATION
Cm following, Pending ID final ABX recs. Plans to DC home no needs if PO ABX at DC.   Electronically signed by Lisa Jerry RN on 2/16/2022 at 3:17 PM   [phone]

## 2022-02-16 NOTE — PROGRESS NOTES
Pt A&O x4, VSS, Pt denies pain. Intermittent infusions of IV abx infusing during shift. Pt reports no needs at this time. Call light and bedside table within reach.

## 2022-02-16 NOTE — PROGRESS NOTES
Infectious Disease Follow up Notes    CC :  R DFI      Antibiotics:   dapto 4mg/kg/24  (SSRI held)    Admit Date:   2/14/2022  Hospital Day: 3    Subjective:   He remains AF on RA   Pain is controlled  He is eager for discharge  Seems to be tolerating the abx well so far     Objective:     Patient Vitals for the past 8 hrs:   BP Temp Temp src Pulse Resp SpO2   02/16/22 1440 138/76 98.4 °F (36.9 °C) Oral 78 14 98 %   02/16/22 1049 109/71 98.5 °F (36.9 °C) Oral 68 16 96 %       EXAM:  Alert, oriented, NAD  No focal rash  Abd soft, flat, NT   R foot dressed.   Recent photo included below  No ascending cellulitis in the R leg       LINE:  PIV site ok                 Scheduled Meds:   daptomycin (CUBICIN) IVPB  4 mg/kg (Adjusted) IntraVENous Q24H    VORTIoxetine HBr  20 mg Oral Daily    [Held by provider] escitalopram  10 mg Oral Daily    atorvastatin  40 mg Oral Daily    gabapentin  1,200 mg Oral TID    insulin glargine  20 Units SubCUTAneous BID    pantoprazole  40 mg Oral QAM AC    insulin lispro  0-6 Units SubCUTAneous TID WC    insulin lispro  0-3 Units SubCUTAneous Nightly    sodium chloride flush  5-40 mL IntraVENous 2 times per day    enoxaparin  40 mg SubCUTAneous Daily    buprenorphine-naloxone  4 tablet SubLINGual Daily    collagenase   Topical Daily       Continuous Infusions:   sodium chloride      dextrose            Data Review:    Lab Results   Component Value Date    WBC 6.2 02/16/2022    HGB 10.5 (L) 02/16/2022    HCT 32.6 (L) 02/16/2022    MCV 77.4 (L) 02/16/2022     02/16/2022     Lab Results   Component Value Date    CREATININE 0.7 (L) 02/16/2022    BUN 17 02/16/2022     02/16/2022    K 3.8 02/16/2022     02/16/2022    CO2 27 02/16/2022       Hepatic Function Panel:   Lab Results   Component Value Date    ALKPHOS 96 11/08/2021    ALT 10 11/08/2021    AST 8 11/08/2021    PROT 7.9 11/08/2021 BILITOT 0.3 11/08/2021    LABALBU 3.3 11/13/2021         MICRO:  2/14 BC x2 NGTD   Wound culture MSF, GpCS and MRSA, CAMPBELL pending       IMAGING:  XR R foot 2/14/22 - no osseous abnormality      Assessment:     Patient Active Problem List    Diagnosis Date Noted    Neck abscess     Facial abscess 11/08/2021    Diabetic polyneuropathy ([address] 09/25/2019    Toe necrosis ([address] 09/24/2019    Diabetic foot infection ([address] 02/20/2018    Toe osteomyelitis, left ([address] 02/20/2018    Gangrene of toe of left foot ([address] 02/18/2018    Type 2 diabetes mellitus ([address] 02/18/2018    Essential hypertension 02/18/2018    Obesity, Class III, BMI 40-49.9 (morbid obesity) ([address] 02/18/2018       History of DM, HTD, HLD, OA     Admitted 2/14/22 with R foot infection and cellulitis  Prior TMA  Without evidence of deep space infection   Wound culture with gram positive organisms - strep and MRSA   Sensitivities on the MRSA are pending     Allergy cephalexin     Plan:     Continue daptomycin as ordered     Anticipate change to po abx at AR.   [address] [place] [address] waiting on the final culture report   Hopefully AR home tomorrow     Discussed with patient/family, all questions answered        Aysha Delarosa MD  Phone: [phone]   Fax : [phone]

## 2022-02-16 NOTE — PROGRESS NOTES
VSS A/Ox4 pt is calm/cooperative. Pt denies any pain at this time. Pt is independent in room with heel weightbearing RLE.

## 2022-02-16 NOTE — PLAN OF CARE
Problem: Pain:  Goal: Pain level will decrease  Description: Pain level will decrease  Outcome: Ongoing     Problem: Infection - Methicillin-Resistant Staphylococcus Aureus Infection:  Goal: Absence of methicillin-resistant Staphylococcus aureus infection  Description: Absence of methicillin-resistant Staphylococcus aureus infection  Outcome: Ongoing     Problem: Skin Integrity:  Goal: Will show no infection signs and symptoms  Description: Will show no infection signs and symptoms  Outcome: Ongoing     Problem: Skin Integrity:  Goal: Absence of new skin breakdown  Description: Absence of new skin breakdown  Outcome: Ongoing

## 2022-02-17 VITALS
RESPIRATION RATE: 16 BRPM | WEIGHT: 315 LBS | SYSTOLIC BLOOD PRESSURE: 164 MMHG | HEART RATE: 68 BPM | BODY MASS INDEX: 39.17 KG/M2 | HEIGHT: 75 IN | TEMPERATURE: 98.6 F | OXYGEN SATURATION: 97 % | DIASTOLIC BLOOD PRESSURE: 95 MMHG

## 2022-02-17 LAB
ANION GAP SERPL CALCULATED.3IONS-SCNC: 5 MMOL/L (ref 3–16)
BASOPHILS ABSOLUTE: 0 K/UL (ref 0–0.2)
BASOPHILS RELATIVE PERCENT: 0.6 %
BUN BLDV-MCNC: 23 MG/DL (ref 7–20)
CALCIUM SERPL-MCNC: 9.3 MG/DL (ref 8.3–10.6)
CHLORIDE BLD-SCNC: 106 MMOL/L (ref 99–110)
CO2: 31 MMOL/L (ref 21–32)
CREAT SERPL-MCNC: 0.8 MG/DL (ref 0.9–1.3)
EOSINOPHILS ABSOLUTE: 0.2 K/UL (ref 0–0.6)
EOSINOPHILS RELATIVE PERCENT: 3.5 %
GFR AFRICAN AMERICAN: >60
GFR NON-AFRICAN AMERICAN: >60
GLUCOSE BLD-MCNC: 109 MG/DL (ref 70–99)
GLUCOSE BLD-MCNC: 127 MG/DL (ref 70–99)
GLUCOSE BLD-MCNC: 97 MG/DL (ref 70–99)
GRAM STAIN RESULT: ABNORMAL
HCT VFR BLD CALC: 33.9 % (ref 40.5–52.5)
HEMOGLOBIN: 10.8 G/DL (ref 13.5–17.5)
LYMPHOCYTES ABSOLUTE: 2.1 K/UL (ref 1–5.1)
LYMPHOCYTES RELATIVE PERCENT: 31 %
MCH RBC QN AUTO: 24.7 PG (ref 26–34)
MCHC RBC AUTO-ENTMCNC: 31.7 G/DL (ref 31–36)
MCV RBC AUTO: 77.9 FL (ref 80–100)
MONOCYTES ABSOLUTE: 0.5 K/UL (ref 0–1.3)
MONOCYTES RELATIVE PERCENT: 8.2 %
NEUTROPHILS ABSOLUTE: 3.8 K/UL (ref 1.7–7.7)
NEUTROPHILS RELATIVE PERCENT: 56.7 %
ORGANISM: ABNORMAL
ORGANISM: ABNORMAL
PDW BLD-RTO: 14.2 % (ref 12.4–15.4)
PERFORMED ON: ABNORMAL
PERFORMED ON: ABNORMAL
PLATELET # BLD: 348 K/UL (ref 135–450)
PMV BLD AUTO: 8.1 FL (ref 5–10.5)
POTASSIUM REFLEX MAGNESIUM: 4.6 MMOL/L (ref 3.5–5.1)
RBC # BLD: 4.35 M/UL (ref 4.2–5.9)
SODIUM BLD-SCNC: 142 MMOL/L (ref 136–145)
TOTAL CK: 28 U/L (ref 39–308)
WBC # BLD: 6.6 K/UL (ref 4–11)
WOUND/ABSCESS: ABNORMAL

## 2022-02-17 PROCEDURE — 36415 COLL VENOUS BLD VENIPUNCTURE: CPT

## 2022-02-17 PROCEDURE — 85025 COMPLETE CBC W/AUTO DIFF WBC: CPT

## 2022-02-17 PROCEDURE — 80048 BASIC METABOLIC PNL TOTAL CA: CPT

## 2022-02-17 PROCEDURE — 2580000003 HC RX 258: Performed by: INTERNAL MEDICINE

## 2022-02-17 PROCEDURE — 6360000002 HC RX W HCPCS: Performed by: INTERNAL MEDICINE

## 2022-02-17 PROCEDURE — 6370000000 HC RX 637 (ALT 250 FOR IP): Performed by: INTERNAL MEDICINE

## 2022-02-17 PROCEDURE — 82550 ASSAY OF CK (CPK): CPT

## 2022-02-17 RX ORDER — CLINDAMYCIN HYDROCHLORIDE 150 MG/1
300 CAPSULE ORAL EVERY 6 HOURS SCHEDULED
Status: DISCONTINUED | OUTPATIENT
Start: 2022-02-17 | End: 2022-02-17 | Stop reason: HOSPADM

## 2022-02-17 RX ORDER — INSULIN LISPRO 100 [IU]/ML
10 INJECTION, SOLUTION INTRAVENOUS; SUBCUTANEOUS
Qty: 1 PEN | Refills: 3
Start: 2022-02-17

## 2022-02-17 RX ORDER — LACTOBACILLUS RHAMNOSUS GG 10B CELL
1 CAPSULE ORAL DAILY
Status: DISCONTINUED | OUTPATIENT
Start: 2022-02-17 | End: 2022-02-17 | Stop reason: HOSPADM

## 2022-02-17 RX ORDER — CLINDAMYCIN HYDROCHLORIDE 300 MG/1
300 CAPSULE ORAL 4 TIMES DAILY
Qty: 28 CAPSULE | Refills: 0 | Status: SHIPPED | OUTPATIENT
Start: 2022-02-17 | End: 2022-02-24

## 2022-02-17 RX ADMIN — SODIUM CHLORIDE, PRESERVATIVE FREE 10 ML: 5 INJECTION INTRAVENOUS at 08:50

## 2022-02-17 RX ADMIN — ACETAMINOPHEN 650 MG: 325 TABLET ORAL at 11:39

## 2022-02-17 RX ADMIN — CLINDAMYCIN HYDROCHLORIDE 300 MG: 150 CAPSULE ORAL at 11:40

## 2022-02-17 RX ADMIN — COLLAGENASE SANTYL: 250 OINTMENT TOPICAL at 09:15

## 2022-02-17 RX ADMIN — PANTOPRAZOLE SODIUM 40 MG: 40 TABLET, DELAYED RELEASE ORAL at 06:32

## 2022-02-17 RX ADMIN — BUPRENORPHINE AND NALOXONE 4 TABLET: 2; .5 TABLET SUBLINGUAL at 08:48

## 2022-02-17 RX ADMIN — GABAPENTIN 1200 MG: 600 TABLET, FILM COATED ORAL at 14:30

## 2022-02-17 RX ADMIN — ATORVASTATIN CALCIUM 40 MG: 40 TABLET, FILM COATED ORAL at 08:46

## 2022-02-17 RX ADMIN — INSULIN GLARGINE 20 UNITS: 100 INJECTION, SOLUTION SUBCUTANEOUS at 08:49

## 2022-02-17 RX ADMIN — GABAPENTIN 1200 MG: 600 TABLET, FILM COATED ORAL at 08:46

## 2022-02-17 ASSESSMENT — PAIN DESCRIPTION - DESCRIPTORS: DESCRIPTORS: HEADACHE

## 2022-02-17 ASSESSMENT — PAIN DESCRIPTION - PAIN TYPE: TYPE: ACUTE PAIN

## 2022-02-17 ASSESSMENT — PAIN SCALES - GENERAL
PAINLEVEL_OUTOF10: 0
PAINLEVEL_OUTOF10: 1
PAINLEVEL_OUTOF10: 1
PAINLEVEL_OUTOF10: 0

## 2022-02-17 ASSESSMENT — PAIN DESCRIPTION - LOCATION: LOCATION: HEAD

## 2022-02-17 NOTE — PROGRESS NOTES
Discharge Progress Note  2/17/2022 6:41 PM    Data:  Discharge order received. Action:  IV D/C'd without difficulty. See Doc Flowsheets for assessment. Patient given discharge instructions with prescriptions. Response:  Patient verbalized understanding of discharge instructions. Patient left with all belongings.     Tal Ball RN  ________________________________________________________________________

## 2022-02-17 NOTE — PROGRESS NOTES
No acute changes overnight, shift uneventful. Pt VSS and patient denies pain. Pt blood sugar 167, given 1 unit insulin lispro and 20 units of Lantus per orders. Pt denies any needs at this time. Call light and bedside table within reach.

## 2022-02-17 NOTE — PROGRESS NOTES
Pt remains a/o x4, VSS. Lungs clear, abdomen soft and non-tender. Pt voiding appropriately. Dressing to RLE changed by podiatry this AM, remains CDI. Surgical shoe applied. Pt denies pain at this time. BS WNL. Pt instructed to keep extremity elevated when possible. Abx filled in outpt pharmacy and delivered to pt. All needs met.

## 2022-02-17 NOTE — PROGRESS NOTES
ID     Chart reviewed  The wound culture is now final     GpCS and MRSA     Staph aureus mrsa       BACTERIAL SUSCEPTIBILITY PANEL BY CAMPBELL     ceFAZolin <=4 mcg/mL Resistant     clindamycin <=0.5 mcg/mL Sensitive     erythromycin >4 mcg/mL Resistant     oxacillin >2 mcg/mL Resistant     tetracycline <=4 mcg/mL Sensitive     trimethoprim-sulfamethoxazole <=0.5/9.5 m. .. Sensitive     vancomycin 2 mcg/mL Sensitive      Only abx allergy listed in cephalexin    Recommend change to po clindamycin 300 QID for 7d along with probiotic   Ok for DC from ID Standpoint   Can follow-up with Dr. Konrad Conroy, patient has prior relationship, if needed      I will not be rounding there today.   Please call if further input is needed  Wesley Andino MD

## 2022-02-17 NOTE — CARE COORDINATION
Case Management Assessment            Discharge Note                    Date / Time of Note: 2/17/2022 11:34 AM                  Discharge Note Completed by: Leandro Ruelas RN    Patient Name: Bibi Caal   YOB: 1980  Diagnosis: Diabetic foot infection ([facility] [E11.628, L08.9]  Type 2 diabetes mellitus with right diabetic foot infection ([facility] [X05.835, L08.9]   Date / Time: 2/14/2022 11:51 AM    Current PCP: Valerio Stout MD, MD  Clinic patient: No    Hospitalization in the last 30 days: No    Advance Directives:  Code Status: Full Code  [place] DNR form completed and on chart: Not Indicated    Financial:  Payor: Tasha Varela / Plan: Jeb Coronado / Product Type: *No Product type* /      Pharmacy:    [facility] [address] Eva Qureshi [phone] - F [phone]  [address] [place] [id number]  Phone: [phone] Fax: [phone]      Assistance purchasing medications?: Potential Assistance Purchasing Medications: No  Assistance provided by Case Management: None at this time    Does patient want to participate in local refill/ meds to beds program?:      Meds To Beds General Rules:  1. Can ONLY be done Monday- Friday between 8:30am-5pm  2. Prescription(s) must be in pharmacy by 3pm to be filled same day  3. Copy of patient's insurance/ prescription drug card and patient face sheet must be sent along with the prescription(s)  4. Cost of Rx cannot be added to hospital bill. If financial assistance is needed, please contact unit  or ;  or  CANNOT provide pharmacy voucher for patients co-pays  5.  Patients can then  the prescription on their way out of the hospital at discharge, or pharmacy can deliver to the bedside if staff is available. (payment due at time of pick-up or delivery - cash, check, or card accepted)     Able to afford home medications/ co-pay costs: Yes    ADLS:  Current PT AM-PAC Score: 23 /24  Current OT AM-PAC Score: 24 /24      DISCHARGE Disposition: Home- No Services Needed    LOC at discharge: Not Applicable  MUNIRA Completed: Not Indicated    Notification completed in HENS/PAS?:  Not Applicable    IMM Completed:   Not Indicated    Transportation:  Transportation PLAN for discharge: family   Mode of Transport: [address] ordered at discharge: Not [address]: Not Applicable  Orders faxed: No    Durable Medical Equipment:  DME Provider: none  Equipment obtained during hospitalization:     Home Oxygen and Respiratory Equipment:  Oxygen needed at discharge?: Not [address]: Not Applicable  Portable tank available for discharge?: Not Indicated    Dialysis:  Dialysis patient: No    Dialysis Center:  Not Applicable      Additional CM Notes: Pt will Dc home with family support on PO abx, no needs from CM. Will follow up OP with Pod team.      The Plan for Transition of Care is related to the following treatment goals of Diabetic foot infection ([facility] [N06.849, L08.9]  Type 2 diabetes mellitus with right diabetic foot infection ([facility] [Z29.493, L08.9]    The Patient and/or patient representative Clifton Martell and his family were provided with a choice of provider and agrees with the discharge plan Yes    Freedom of choice list was provided with basic dialogue that supports the patient's individualized plan of care/goals and shares the quality data associated with the providers.  Not Indicated    Care Transitions patient: No    Princess Alysa RN  [facility]  Case Management Department  Ph: [phone]  Fax: [phone]

## 2022-02-18 LAB
BLOOD CULTURE, ROUTINE: NORMAL
CULTURE, BLOOD 2: NORMAL

## 2022-02-22 RX ORDER — INSULIN GLARGINE 100 [IU]/ML
INJECTION, SOLUTION SUBCUTANEOUS
Qty: 15 ML | Refills: 0 | OUTPATIENT
Start: 2022-02-22

## 2022-03-17 NOTE — DISCHARGE SUMMARY
Hospital Discharge Summary    Patient's PCP: Ivan Rey. Cameron Priest MD, MD  Admit Date: 2/14/2022   Discharge Date: 3/17/2022    Admitting Physician: Tracey Rodriguez DO  Discharge Physician: Mc Cox MD       Discharge Diagnoses:     Dc on po abx per ID. F/u with pod and ID.     Diabetes mellitus  Insulin sliding scale Lantus,     Slightly elevated blood pressure  Patient used to be on antihypertensives in the past but not on it recently, will monitor if for persistently elevated will consider adding antihypertensive on discharge     On chronic Suboxone       Patient Active Problem List   Diagnosis    [facility]rene of toe of left foot [facility]    Type 2 diabetes mellitus ([facility]    Essential hypertension    Obesity, Class III, BMI 40-49.9 (morbid obesity) ([facility]    Diabetic foot infection ([facility]    Toe osteomyelitis, left (HCC)    Toe necrosis (HCC)    Diabetic polyneuropathy ([facility]    Facial abscess    Neck abscess       Physical Exam: BP (!) 164/95 Comment: RN notified   Pulse 68   Temp 98.6 °F (37 °C) (Oral)   Resp 16   Ht 6' 2.5\" (1.892 m)   Wt (!) 315 lb (142.9 kg)   SpO2 97%   BMI 39.90 kg/m²   No results for input(s): POCGLU in the last 72 hours. General appearance:  Appears comfortable  Eyes: Sclera clear. Pupils equal.  ENT: Moist oral mucosa. Trachea midline, no adenopathy. Cardiovascular: Regular rhythm, normal S1, S2. No murmur. No edema in lower extremities  Respiratory: Not using accessory muscles. Good inspiratory effort. Clear to auscultation bilaterally, no wheeze or crackles. GI: Abdomen soft, no tenderness, not distended, normal bowel sounds  Musculoskeletal: No cyanosis in digits, neck supple  Neurology: CN 2-12 grossly intact. No speech or motor deficits  Psych: Normal affect.  Alert and oriented in time, place and person        Labs and Tests:  CBC:         Recent Labs     02/14/22  1436 02/15/22  0531 02/16/22  0520   WBC 10.5 7.8 6.2   HGB 10.6* 9.9* 10.5*    353 378      BMP: Recent Labs     02/14/22  1436 02/15/22  0531 02/16/22  0521    141 139   K 4.1 3.8 3.8    107 106   CO2 24 26 27   BUN 20 18 17   CREATININE 0.6* 0.7* 0.7*   GLUCOSE 97 96 90      Hepatic: No results for input(s): AST, ALT, ALB, BILITOT, ALKPHOS in the last 72 hours. XR FOOT RIGHT (MIN 3 VIEWS)   Final Result   Impression: Prior to dictation of all digits at the level of the metatarsal heads. No acute destructive osseous process.                Bren Jaime MD   2/16/2022 11:11 AM  No                    LABS:  No results for input(s): WBC, HGB, PLT in the last 72 hours. No results for input(s): NA, K, CL, CO2, BUN, CREATININE, GLUCOSE in the last 72 hours. No results for input(s): INR in the last 72 hours.       Discharge Medications:     Medication List      CHANGE how you take these medications    insulin lispro (1 Unit Dial) 100 UNIT/ML Sopn  Inject 10 Units into the skin 3 times daily (before meals)  What changed: how much to take        CONTINUE taking these medications    atorvastatin 40 MG tablet  Commonly known as: LIPITOR     empagliflozin 10 MG tablet  Commonly known as: JARDIANCE     escitalopram 20 MG tablet  Commonly known as: LEXAPRO     gabapentin 600 MG tablet  Commonly known as: NEURONTIN     insulin glargine 100 UNIT/ML injection pen  Commonly known as: LANTUS;BASAGLAR  Inject 35 Units into the skin 2 times daily     Insulin Pen Needle 31G X 8 MM Misc  1 each by Does not apply route daily     omeprazole 40 MG delayed release capsule  Commonly known as: PRILOSEC     pioglitazone 30 MG tablet  Commonly known as: ACTOS     Suboxone 8-2 MG Subl SL tablet  Generic drug: buprenorphine-naloxone     Trintellix 20 MG Tabs tablet  Generic drug: VORTIoxetine HBr     vitamin D 1.25 MG (36890 UT) Caps capsule  Commonly known as: ERGOCALCIFEROL     Vitamin D3 50 MCG (2000 UT) Caps        STOP taking these medications    citalopram 10 MG tablet  Commonly known as: CELEXA hydroCHLOROthiazide 12.5 MG capsule  Commonly known as: MICROZIDE     lisinopril 40 MG tablet  Commonly known as: PRINIVIL;ZESTRIL        ASK your doctor about these medications    ciprofloxacin 500 MG tablet  Commonly known as: CIPRO  Take 1 tablet by mouth 2 times daily for 14 days  Ask about: Should I take this medication? * clindamycin 300 MG capsule  Commonly known as: CLEOCIN  Take 1 capsule by mouth 4 times daily for 14 days  Ask about: Should I take this medication? * clindamycin 300 MG capsule  Commonly known as: CLEOCIN  Take 1 capsule by mouth 4 times daily for 7 days  Ask about: Should I take this medication?     collagenase 250 UNIT/GM ointment  Apply topically daily. Ask about: Should I take this medication? * This list has 2 medication(s) that are the same as other medications prescribed for you. Read the directions carefully, and ask your doctor or other care provider to review them with you. Where to Get Your Medications      You can get these medications from any pharmacy    Bring a paper prescription for each of these medications  · ciprofloxacin 500 MG tablet  · clindamycin 300 MG capsule  · clindamycin 300 MG capsule     Information about where to get these medications is not yet available    Ask your nurse or doctor about these medications  · collagenase 250 UNIT/GM ointment  · insulin lispro (1 Unit Dial) 100 UNIT/ML Sopn        Activity: activity as tolerated and restrictions per podiatry  Diet: diabetic diet  Wound Care: as directed    Disposition: home  Discharged Condition: Stable  Follow Up: Primary Care Physician in one week    Total time spent on discharge, finalizing medications, referrals and arranging outpatient follow up was more than 30 minutes    Thank you Dr. Marixa Phelan MD, MD for the opportunity to be involved in this patients care. If you have any questions or concerns please feel free to contact me at [phone].

## 2022-04-14 ENCOUNTER — HOSPITAL ENCOUNTER (EMERGENCY)
Age: 42
Discharge: HOME | End: 2022-04-14
Attending: EMERGENCY MEDICINE
Payer: COMMERCIAL

## 2022-04-14 ENCOUNTER — APPOINTMENT (OUTPATIENT)
Dept: GENERAL RADIOLOGY | Age: 42
End: 2022-04-14
Payer: COMMERCIAL

## 2022-04-14 VITALS
HEART RATE: 95 BPM | BODY MASS INDEX: 38.42 KG/M2 | SYSTOLIC BLOOD PRESSURE: 151 MMHG | RESPIRATION RATE: 20 BRPM | DIASTOLIC BLOOD PRESSURE: 101 MMHG | TEMPERATURE: 98.9 F | OXYGEN SATURATION: 99 % | WEIGHT: 309 LBS | HEIGHT: 75 IN

## 2022-04-14 DIAGNOSIS — M86.9 OSTEOMYELITIS OF LEFT FOOT, UNSPECIFIED TYPE (HCC): Primary | ICD-10-CM

## 2022-04-14 DIAGNOSIS — S91.301A OPEN WOUND OF RIGHT FOOT, INITIAL ENCOUNTER: ICD-10-CM

## 2022-04-14 LAB
ANION GAP SERPL CALCULATED.3IONS-SCNC: 16 MMOL/L (ref 3–16)
BASOPHILS ABSOLUTE: 0.1 K/UL (ref 0–0.2)
BASOPHILS RELATIVE PERCENT: 0.8 %
BUN BLDV-MCNC: 24 MG/DL (ref 7–20)
C-REACTIVE PROTEIN: 9.9 MG/L (ref 0–5.1)
CALCIUM SERPL-MCNC: 10 MG/DL (ref 8.3–10.6)
CHLORIDE BLD-SCNC: 100 MMOL/L (ref 99–110)
CO2: 22 MMOL/L (ref 21–32)
CREAT SERPL-MCNC: 0.8 MG/DL (ref 0.9–1.3)
EOSINOPHILS ABSOLUTE: 0.1 K/UL (ref 0–0.6)
EOSINOPHILS RELATIVE PERCENT: 0.6 %
GFR AFRICAN AMERICAN: >60
GFR NON-AFRICAN AMERICAN: >60
GLUCOSE BLD-MCNC: 225 MG/DL (ref 70–99)
HCT VFR BLD CALC: 38.5 % (ref 40.5–52.5)
HEMOGLOBIN: 12.7 G/DL (ref 13.5–17.5)
LACTIC ACID: 1.2 MMOL/L (ref 0.4–2)
LYMPHOCYTES ABSOLUTE: 2 K/UL (ref 1–5.1)
LYMPHOCYTES RELATIVE PERCENT: 16.3 %
MCH RBC QN AUTO: 24.4 PG (ref 26–34)
MCHC RBC AUTO-ENTMCNC: 33.1 G/DL (ref 31–36)
MCV RBC AUTO: 73.9 FL (ref 80–100)
MONOCYTES ABSOLUTE: 0.4 K/UL (ref 0–1.3)
MONOCYTES RELATIVE PERCENT: 3.6 %
NEUTROPHILS ABSOLUTE: 9.6 K/UL (ref 1.7–7.7)
NEUTROPHILS RELATIVE PERCENT: 78.7 %
PDW BLD-RTO: 16.4 % (ref 12.4–15.4)
PLATELET # BLD: 366 K/UL (ref 135–450)
PMV BLD AUTO: 7.9 FL (ref 5–10.5)
POTASSIUM REFLEX MAGNESIUM: 4.5 MMOL/L (ref 3.5–5.1)
RBC # BLD: 5.21 M/UL (ref 4.2–5.9)
SEDIMENTATION RATE, ERYTHROCYTE: 25 MM/HR (ref 0–15)
SODIUM BLD-SCNC: 138 MMOL/L (ref 136–145)
WBC # BLD: 12.2 K/UL (ref 4–11)

## 2022-04-14 PROCEDURE — 2580000003 HC RX 258: Performed by: STUDENT IN AN ORGANIZED HEALTH CARE EDUCATION/TRAINING PROGRAM

## 2022-04-14 PROCEDURE — 85652 RBC SED RATE AUTOMATED: CPT

## 2022-04-14 PROCEDURE — 36415 COLL VENOUS BLD VENIPUNCTURE: CPT

## 2022-04-14 PROCEDURE — 87040 BLOOD CULTURE FOR BACTERIA: CPT

## 2022-04-14 PROCEDURE — 6360000002 HC RX W HCPCS: Performed by: STUDENT IN AN ORGANIZED HEALTH CARE EDUCATION/TRAINING PROGRAM

## 2022-04-14 PROCEDURE — 83605 ASSAY OF LACTIC ACID: CPT

## 2022-04-14 PROCEDURE — 86140 C-REACTIVE PROTEIN: CPT

## 2022-04-14 PROCEDURE — 96365 THER/PROPH/DIAG IV INF INIT: CPT

## 2022-04-14 PROCEDURE — 85025 COMPLETE CBC W/AUTO DIFF WBC: CPT

## 2022-04-14 PROCEDURE — 73630 X-RAY EXAM OF FOOT: CPT

## 2022-04-14 PROCEDURE — 80048 BASIC METABOLIC PNL TOTAL CA: CPT

## 2022-04-14 PROCEDURE — 96366 THER/PROPH/DIAG IV INF ADDON: CPT

## 2022-04-14 PROCEDURE — 99285 EMERGENCY DEPT VISIT HI MDM: CPT

## 2022-04-14 RX ORDER — DOXYCYCLINE HYCLATE 100 MG
100 TABLET ORAL 2 TIMES DAILY
Qty: 28 TABLET | Refills: 1 | Status: SHIPPED | OUTPATIENT
Start: 2022-04-14 | End: 2022-04-14 | Stop reason: SDUPTHER

## 2022-04-14 RX ORDER — DOXYCYCLINE HYCLATE 100 MG
100 TABLET ORAL 2 TIMES DAILY
Qty: 28 TABLET | Refills: 1 | Status: SHIPPED | OUTPATIENT
Start: 2022-04-14 | End: 2022-05-09

## 2022-04-14 RX ADMIN — VANCOMYCIN HYDROCHLORIDE 2000 MG: 10 INJECTION, POWDER, LYOPHILIZED, FOR SOLUTION INTRAVENOUS at 18:27

## 2022-04-14 ASSESSMENT — ENCOUNTER SYMPTOMS
DIARRHEA: 0
SORE THROAT: 0
CHEST TIGHTNESS: 0
SHORTNESS OF BREATH: 0
COUGH: 0
NAUSEA: 0
ABDOMINAL PAIN: 0
VOMITING: 0

## 2022-04-14 ASSESSMENT — PAIN DESCRIPTION - DESCRIPTORS
DESCRIPTORS: BURNING
DESCRIPTORS: BURNING;ACHING
DESCRIPTORS: BURNING

## 2022-04-14 ASSESSMENT — PAIN SCALES - GENERAL
PAINLEVEL_OUTOF10: 1

## 2022-04-14 ASSESSMENT — PAIN DESCRIPTION - LOCATION
LOCATION: FOOT

## 2022-04-14 ASSESSMENT — PAIN DESCRIPTION - PAIN TYPE
TYPE: CHRONIC PAIN

## 2022-04-14 ASSESSMENT — PAIN DESCRIPTION - ORIENTATION
ORIENTATION: RIGHT

## 2022-04-14 ASSESSMENT — PAIN DESCRIPTION - FREQUENCY
FREQUENCY: CONTINUOUS

## 2022-04-14 ASSESSMENT — PAIN DESCRIPTION - PROGRESSION
CLINICAL_PROGRESSION: NOT CHANGED
CLINICAL_PROGRESSION: GRADUALLY WORSENING
CLINICAL_PROGRESSION: NOT CHANGED

## 2022-04-14 ASSESSMENT — PAIN - FUNCTIONAL ASSESSMENT
PAIN_FUNCTIONAL_ASSESSMENT: PREVENTS OR INTERFERES SOME ACTIVE ACTIVITIES AND ADLS
PAIN_FUNCTIONAL_ASSESSMENT: 0-10

## 2022-04-14 ASSESSMENT — PAIN DESCRIPTION - ONSET
ONSET: ON-GOING

## 2022-04-14 NOTE — ED PROVIDER NOTES
[id number] [facility] RESIDENT NOTE       Date of evaluation: 4/14/2022    Chief Complaint     Foot Pain (bilateral) and Other (concern for wound on left foot)    History of Present Illness     Susana Brady is a 35 y.o. male with PMHx of type 2 diabetes and diabetic polyneuropathy who presents with bilateral feet pain along with concern for recurrence of right foot infection due to drainage. Patient reports onset of pain and wounds on his right lower extremity. Patient had an amputation to all toes on left foot has had multiple complications ulcers skin infections to that foot. Patient states when he was last discharged from the hospital, he was informed that his skin breakdown and ulcers are likely due to the way he places pressure on his foot when he ambulates. Patient was instructed to follow-up with podiatry, but has been unable to at [facility] and missed his post discharge clinic appointment. Reports stable skin breakdown on his left foot with no increased pain. Denies any systemic symptoms including fevers, chills, nausea, vomiting, lightheadedness, or any other complaints. Review of Systems     Review of Systems   Constitutional: Negative for chills, diaphoresis, fatigue and fever. HENT: Negative for sore throat. Eyes: Negative for visual disturbance. Respiratory: Negative for cough, chest tightness and shortness of breath. Cardiovascular: Negative for chest pain and palpitations. Gastrointestinal: Negative for abdominal pain, diarrhea, nausea and vomiting. Genitourinary: Negative for difficulty urinating. Musculoskeletal: Negative for neck pain. BL foot pain, R foot wound drainage   Neurological: Negative for dizziness, syncope, weakness, light-headedness, numbness and headaches. All other systems reviewed and are negative.     Past Medical, Surgical, Family, and Social History     He has a past medical history of Depression, Diabetes mellitus ([facility] [address] Hyperlipidemia, Hypertension, and Osteoarthritis of both knees. He has a past surgical history that includes knee surgery (Right, 6/19/2013); Toe amputation (Left); Foot surgery (Left, 02/19/2018); Toe amputation (Right, 9/26/2019); and Neck surgery (Left, 11/9/2021). His family history is not on file. He reports that he has never smoked. He has never used smokeless tobacco. He reports that he does not drink alcohol and does not use drugs. Medications     Current Discharge Medication List      CONTINUE these medications which have NOT CHANGED    Details   insulin lispro, 1 Unit Dial, 100 UNIT/ML SOPN Inject 10 Units into the skin 3 times daily (before meals)  Qty: 1 pen, Refills: 3      escitalopram (LEXAPRO) 20 MG tablet Take 10 mg by mouth daily      omeprazole (PRILOSEC) 40 MG delayed release capsule Take 1 capsule (40 mg) by mouth daily 30 minutes before breakfast and dinner. pioglitazone (ACTOS) 30 MG tablet Take 30 mg by mouth daily      insulin glargine (LANTUS;BASAGLAR) 100 UNIT/ML injection pen Inject 35 Units into the skin 2 times daily  Qty: 5 pen, Refills: 3      Insulin Pen Needle 31G X 8 MM MISC 1 each by Does not apply route daily  Qty: 100 each, Refills: 3      empagliflozin (JARDIANCE) 10 MG tablet Take 25 mg by mouth daily       gabapentin (NEURONTIN) 600 MG tablet Take 1,200 mg by mouth 3 times daily. vitamin D (ERGOCALCIFEROL) 1.25 MG (90301 UT) CAPS capsule TAKE 1 CAPSULE BY MOUTH ONE TIME A WEEK      VORTIoxetine HBr (TRINTELLIX) 20 MG TABS tablet Take 20 mg by mouth daily      Cholecalciferol (VITAMIN D3) 2000 units CAPS Take by mouth      atorvastatin (LIPITOR) 40 MG tablet Take 40 mg by mouth daily      buprenorphine-naloxone (SUBOXONE) 8-2 MG SUBL SL tablet Place 1 tablet under the tongue daily. Allergies     He is allergic to cephalexin and liraglutide.     Physical Exam     INITIAL VITALS: BP: (!) 168/108, Temp: 99.3 °F (37.4 °C), Pulse: 102, Resp: 16, SpO2: 100 %   Physical Exam  Vitals and nursing note reviewed. Constitutional:       General: He is not in acute distress. Appearance: Normal appearance. He is normal weight. He is not ill-appearing, toxic-appearing or diaphoretic. HENT:      Head: Normocephalic and atraumatic. Mouth/Throat:      Mouth: Mucous membranes are moist.      Pharynx: No posterior oropharyngeal erythema. Eyes:      Extraocular Movements: Extraocular movements intact. Conjunctiva/sclera: Conjunctivae normal.      Pupils: Pupils are equal, round, and reactive to light. Cardiovascular:      Rate and Rhythm: Normal rate and regular rhythm. Pulses: Normal pulses. Dorsalis pedis pulses are 2+ on the right side and 2+ on the left side. Heart sounds: Normal heart sounds. Pulmonary:      Effort: Pulmonary effort is normal.      Breath sounds: Normal breath sounds. Abdominal:      General: There is no distension. Palpations: Abdomen is soft. Tenderness: There is no abdominal tenderness. Musculoskeletal:         General: Normal range of motion. Cervical back: Normal range of motion and neck supple. Right foot: Normal range of motion. No deformity. Left foot: Normal range of motion. No deformity. Right Lower Extremity: Right leg is amputated below ankle. (all toes amputated)  Feet:      Right foot:      Skin integrity: Skin breakdown, erythema and warmth present. Left foot:      Skin integrity: Skin breakdown, erythema and warmth present. Comments: R foot: Surgical scar looks intact. Areas of skin breakdown at the first metatarsal and fifth metatarsal with then open ulcer around the fifth metatarsal any purulent drainage. L foot: Third through fifth digits with significant skin breakdown but no erythema or purulent discharge. Skin:     General: Skin is warm. Capillary Refill: Capillary refill takes less than 2 seconds.    Neurological: Medical Hx, Past Surgical Hx, Social Hx, Allergies, and Family Hx were reviewed. The patient was given the followingmedications:  Orders Placed This Encounter   Medications    vancomycin (VANCOCIN) 2,000 mg in dextrose 5 % 500 mL IVPB     Order Specific Question:   Antimicrobial Indications     Answer:   Skin and Soft Tissue Infection    doxycycline hyclate (VIBRA-TABS) 100 MG tablet     Sig: Take 1 tablet by mouth 2 times daily for 28 days     Dispense:  28 tablet     Refill:  1     CONSULTS:  IP CONSULT TO [address] / ASSESSMENT / Ewa Iwona is a 35 y.o. male with PMHx of type 2 diabetes and diabetic polyneuropathy who presents with bilateral feet pain along with concern for recurrence of right foot infection due to drainage. Hypertensive but otherwise hemodynamically stable. Right foot with increased warmth and some serosanguineous drainage but no overt signs of cellulitis. Left foot with no tenderness to palpation and skin breakdown third through fifth digits. Labs with leukocytosis. Although patient presented for concern with her right foot, x-ray of the right foot shows no osseous abnormality. However, left foot x-ray has findings suspicious for osteomyelitis at the tip of the first proximal phalanx and at the tip of the third distal phalanx. Patient started on IV vancomycin and podiatry consulted who report that patient be discharged on doxycycline and will be followed up in clinic tomorrow. They state that his first phalanx osteomyelitis has been present since 2018 and the third digit osteomyelitis may or may not be new. Plan explained to patient who is agreeable and will follow up in clinic tomorrow. Patient given prescription for doxycycline to take at home. This patient was also evaluated by the attending physician. All care plans were discussed and agreed upon. Clinical Impression     1.  Osteomyelitis of left foot, unspecified type ([address]    2. Open wound of right foot, initial encounter      Disposition     PATIENT REFERRED TO:  [place] [address] [address]  [phone]    Call on 4/15/2022  For wound re-check Please call for appointment tomorrow. DISCHARGE MEDICATIONS:  Current Discharge Medication List      START taking these medications    Details   doxycycline hyclate (VIBRA-TABS) 100 MG tablet Take 1 tablet by mouth 2 times daily for 28 days  Qty: 28 tablet, Refills: 1             DISPOSITION    Discharge pending completion of vancomycin.         Tahir Covington MD  Resident  04/14/22 6037

## 2022-04-14 NOTE — ED PROVIDER NOTES
ED Attending Attestation Note     Date of evaluation: 4/14/2022    This patient was seen by the resident. I have seen and examined the patient, agree with the workup, evaluation, management and diagnosis. The care plan has been discussed. My assessment reveals history of diabetes who presents to the ED with right foot pain. He has a suture line noted along the foot with toe amputations. There is some dried drainage around that area. He feels that he is becoming infected again. X-ray was suspicious for osteomyelitis of first proximal phalanx and the tip of the third phalanx.   IV antibiotics will be started     Thompson Terrell MD  04/14/22 [id number]

## 2022-04-14 NOTE — ED NOTES
Received report from previous shift RN. Assume care of patient at this time.       Evy Haines RN  04/14/22 1921

## 2022-04-14 NOTE — CONSULTS
Department of Podiatry Consult Note  Resident       Reason for Consult: Osteomyelitis third digit left foot    Requesting Physician:  Dr. Jose Scales: Bilateral foot pain    HISTORY OF PRESENT ILLNESS:                The patient is a 35 y.o. male with significant past medical history please see list below. Podiatry was consulted for concern for osteomyelitis to the third digit left foot. Patient was last seen in the hospital on 2/14/2022 for similar presentation. Patient admits after he was discharged he tried to schedule appointment with  podiatrist, but was unsuccessful. Patient was doing daily dressing changes consisting of Betadine, dry sterile gauze and Ace bandage to the right foot and Betadine paint to the 3 digits on the left foot. Relates also that a week ago increased drainage to the right foot as well as burning and tingling to bilateral feet that concerned him to go to the emergency department. Patient relates the pain a 1 out of 10. Patient relates the pain is more burning and tingling. Patient denies nausea, vomiting, fever, chills, shortness of breath, or other constitutional symptoms. Patient denies any other pedal complaints during today's physical examination.         Past Medical History:        Diagnosis Date    Depression     Diabetes mellitus ([address]     Hyperlipidemia     Hypertension     Osteoarthritis of both knees      Past Surgical History:        Procedure Laterality Date    FOOT SURGERY Left 02/19/2018     INCISION AND DRAINAGE WITH HALLUX AMPUTATION LEFT FOOT    KNEE SURGERY Right 6/19/2013    NECK SURGERY Left 11/9/2021    LEFT FACIAL ABSCESS INCISION AND DRAINAGE performed by Rosa Godinez MD at [place] [address] Left     2nd toe    TOE AMPUTATION Right 9/26/2019    RIGHT HALLUX AMPUTATION WITH POSSIBLE 1ST RAY AMPUTATION performed by Celso Jo DPM at [facility] OR     Current Medications:    Current Facility-Administered Medications: vancomycin (VANCOCIN) 2,000 mg in dextrose 5 % 500 mL IVPB, 2,000 mg, IntraVENous, Once  Allergies:   Cephalexin and Liraglutide  Social History:    TOBACCO:   reports that he has never smoked. He has never used smokeless tobacco.  Never used tobacco  ETOH:   reports no history of alcohol use. Family History:   History reviewed. No pertinent family history. REVIEW OF SYSTEMS:   A 10 point review of systems was conducted, significant findings as noted in HPI. All other systems negative. PHYSICAL EXAM:      Vitals:    BP (!) 147/105   Pulse 96   Temp 99.3 °F (37.4 °C) (Oral)   Resp 16   Ht 6' 3\" (1.905 m)   Wt (!) 309 lb (140.2 kg)   SpO2 100%   BMI 38.62 kg/m²     LABS:   Recent Labs     04/14/22  1658   WBC 12.2*   HGB 12.7*   HCT 38.5*        Recent Labs     04/14/22  1658      K 4.5      CO2 22   BUN 24*   CREATININE 0.8*     No results for input(s): PROT, INR, APTT in the last 72 hours. VASCULAR:   - DP and PT pulses are non-palpable  b/l. - Upon hand-held Doppler biphasic signals noted to DP and PT pulses B/L LE.  - CFT is brisk to distal TMA stump on the right and the remaining digits on the left. - Skin temperature is warm to cool from proximal to distal with no focal calor noted. -Mild edema noted RLE > LLE.   - No pain with calf compression b/l. NEUROLOGIC:   - Gross and epicritic sensation is diminished b/l.   - Protective sensation is diminished at all pedal sites b/l. DERMATOLOGIC: Dermatological changes noted B/L LE    Right lower extremity:     #1  -Full-thickness ulceration noted lateral aspect of fifth TMA site.  (Distal)  -Wound measures approximately 1.9 x 1.4 x 0.1 cm.  -Wound base mixture of fibrotic and granular tissue with epithelialized periwound.  -Wound does not probe to bone, tunnels, or tracks.  -No fluctuance, crepitus, malodor, or drainage noted.  -No acute signs of infection or acute periwound erythema noted. #2  -Full-thickness ulceration noted lateral aspect of fifth TMA site (Proximal)  -Wound measures approximately 1.4 x 1.0 x 0.1 cm.  -Wound base mixture of fibrotic and granular tissue with epithelialized periwound.  -Wound does not probe to bone, tunnels, or tracks.  -No fluctuance, crepitus, malodor, or drainage noted.  -No acute signs of infection or acute periwound erythema noted. Picture taken 4/14/2022          Left lower extremity:   -Multiple superficial abrasions noted to the third, fourth, and fifth digit.  -Dried sanguinous crust noted to all 3 digits.  -All wounds dermal to epithelialized tissue noted.  -Wound does not probe to bone, tunnels, or tracks.  -No fluctuance, crepitus, malodor, or drainage noted.  -No acute signs of infection or acute periwound erythema noted. Picture taken 4/14/2022    Patient gave verbal consent for the picture taken at today's visit. MUSCULOSKELETAL:   - Muscle strength is 5/5 for all pedal groups tested. - No pain with palpation of the foot or ankle b/l. - Ankle joint ROM is decreased in dorsiflexion with the knee extended. - History of transmetatarsal amputation RLE.  - History of partial hallux amputation, and partial second digit amputation LLE. IMAGING:   Right lower extremity foot x-ray; 4/14/2022  Narrative   History: Right foot wound. Right foot infection.       3 views right foot.       FINDINGS: Previous transmetatarsal amputation across distal metatarsals. Osseous structures appear not appreciably changed in comparison to 2/14/2022. No soft tissue gas or discrete erosive abnormality identified.           Impression   1. Prior amputation with no suspicious osseous abnormality. Left lower extremity foot x-ray; 4/14/2020  Narrative   History: Left foot infection. Left foot pain.       3 views left foot.       FINDINGS: Prior amputation of the first and second digits across the proximal phalanx level. No fracture.  No subluxation. Equivocal lysis at the tip of the amputated first proximal phalanx. Erosive changes involving the tip of the third distal phalanx. Findings suspicious for possible osteomyelitis.           Impression   1. Findings suspicious for osteomyelitis at the tip of the first proximal phalanx and at the tip of the third distal phalanx. IMPRESSION/RECOMMENDATIONS:    - Osteomyelitis first proximal phalanx; left foot  - Osteomyelitis distal phalanx third digit; left foot  - Full-thickness ulceration, right lower extremity; Rox Bihari II (POA)  - Multiple superficial abrasions; bilateral lower extremity (POA)  - Diabetes mellitus type II with peripheral neuropathy  - History of amputations; bilateral lower extremity      -Patient seen and examined at ED bedside this evening  -Hypertensive, slightly afebrile 99.3, and leukocytosis noted  -ESR and CRP pending  -All xray images reviewed, findings suspicious for osteomyelitis at the tip of the first proximal phalanx and third distal phalanx  -Wound culture not obtained at this time 2/2 no active drainage  -Right lower extremity dressed with Betadine, DSD, and Ace bandage.  -Left lower extremity dressed with Betadine to toes 3, 4, and 5.  -Post-op shoe ordered to patient room  -Full weightbearing to B/L LE  -Lengthy discussion with patient regarding the importance of extremity elevation and edema control, patient voiced understanding.  -Lengthy discussion with patient regarding bone infection and the need for possible surgical intervention in the near tentative future.   Patient understood.  -Rx of doxycycline 100 mg twice daily x14 days.  -Lengthy discussion with patient talking to follow-up with the [facility] podiatry clinic tomorrow afternoon if patient is discharged from the hospital.  -Instructed patient to look for signs including but not limited to increased swelling, drainage, redness streaking up the leg and/or nausea, vomiting, increased temperature to please go to the nearest emergency department for further work-up and evaluation. DISPO: Multiple full-thickness ulcerations to the right lower extremity stable without acute signs of infection. Osteomyelitis noted to the proximal first and third distal phalanx on the left foot. Will follow-up on CRP and ESR. If patient is admitted to the hospital will follow-up closely. If the patient is discharged today Rx of doxycycline placed in patient's chart and he can follow-up at the [facility] podiatry clinic tomorrow afternoon 4/15/2022. Thank you for the opportunity to take part in the patient's care.     -Discussed assessment and plan with Dr. Michael Phillip. Cara Crow DPM   Podiatric Resident, PGY-3  Pager: [phone] or Perfect serve         This chart was likely completed using voice recognition technology and may contain unintended grammatical, phraseology, and or punctuation errors.

## 2022-04-15 ENCOUNTER — TELEPHONE (OUTPATIENT)
Dept: INTERNAL MEDICINE CLINIC | Age: 42
End: 2022-04-15

## 2022-04-15 ENCOUNTER — OFFICE VISIT (OUTPATIENT)
Dept: INTERNAL MEDICINE CLINIC | Age: 42
End: 2022-04-15
Payer: COMMERCIAL

## 2022-04-15 VITALS — TEMPERATURE: 97 F

## 2022-04-15 DIAGNOSIS — L97.529 TYPE 2 DIABETES MELLITUS WITH LEFT DIABETIC FOOT ULCER (HCC): Primary | ICD-10-CM

## 2022-04-15 DIAGNOSIS — M86.9 TOE OSTEOMYELITIS, LEFT (HCC): ICD-10-CM

## 2022-04-15 DIAGNOSIS — L89.899 PRESSURE ULCER OF RIGHT LOWER EXTREMITY: ICD-10-CM

## 2022-04-15 DIAGNOSIS — M86.9 OSTEOMYELITIS OF LEFT FOOT, UNSPECIFIED TYPE (HCC): ICD-10-CM

## 2022-04-15 DIAGNOSIS — E11.621 TYPE 2 DIABETES MELLITUS WITH LEFT DIABETIC FOOT ULCER (HCC): Primary | ICD-10-CM

## 2022-04-15 PROCEDURE — 99213 OFFICE O/P EST LOW 20 MIN: CPT

## 2022-04-15 NOTE — TELEPHONE ENCOUNTER
Multiple home health agency called . No one can take him I sent dressing home with him so he can change dressings for now. He knows I will try Monday to find a Home health to take him.

## 2022-04-15 NOTE — FLOWSHEET NOTE
Patient discharged per MD. Vital signs obtained. IV removed. Discharge instructions reviewed with patient. Patient denied taking prescription states his mother already picked it up at the pharmacy. Patient ambulated to checkout .

## 2022-04-16 NOTE — PROGRESS NOTES
Department of Podiatry  Resident Progress Note    Rajiv Acharya  Allergies: Cephalexin and Liraglutide    SUBJECTIVE  The patient is a 35 y.o. male who presents to clinic for wound check. Of note, patient was recently at the [facility] emergency department for complaint of bilateral foot pain, where podiatry was consulted for possible osteomyelitis to the third digit of the left foot. Patient states that he tried to schedule an appointment with a podiatrist at [facility] that he followed but, was unsuccessful at the time. Patient states that he has been doing daily dressing changes to his feet consisting of Betadine, dry sterile gauze and an Ace bandage to the right foot and Betadine to the digits of the left foot. Patient states that he was discharged from the emergency department on doxycycline for 14 days. He states that he started using the antibiotics once he picked it up on Thursday evening. Patient denies any other pedal complaints at this time. Patient denies any nausea, vomiting, fever, chills, shortness of breath. Past Medical History:        Diagnosis Date    Depression     Diabetes mellitus ([address]     Hyperlipidemia     Hypertension     Osteoarthritis of both knees        REVIEW OF SYSTEMS:  As Above    OBJECTIVE  Patient presents to clinic today unaccompanied and unassisted with surgical shoe to the right lower extremity and flip-flops to the left lower extremity. Patient is in no acute distress. Patient is alert and oriented x3. VASCULAR:   DP and PT pulses are nonpalpable 0/4 bilateral lower extremity. Upon hand-held Doppler examination biphasic signal noted to DP and PT pulses. CFT is brisk to distal TMA stump on the right and the remaining digits on the left foot. Skin temperature is warm to cool from proximal to distal with no focal calor noted. No edema noted. No pain with calf compression b/l. NEUROLOGIC: Gross and epicritic sensation is diminished b/l.  Protective sensation is diminished at all pedal sites b/l. DERMATOLOGIC:   Right lower extremity:  Full-thickness ulceration of the lateral aspect of the TMA site. Wound measures approximately 1.9 cm x 1.4 cm x 0.1 cm. Wound base is a mixture of fibrotic and granular tissue. The wound does not probe to bone, tunnel, or tract. No fluctuance, crepitus, malodor, or drainage noted. No acute signs of infection noted. Full-thickness ulceration noted to the lateral aspect of the TMA site just proximal to the above-noted wound. Wound measures approximately 1.4 cm x 1.0 cm x 0.1 cm. Wound base is a mixture of fibrotic and granular tissue. Wound does not probe to bone, tunnel, or track. No fluctuance, crepitus, malodor, or drainage noted. No acute signs of infection noted. Left lower extremity:  Multiple superficial abrasions noted to the third fourth and fifth digits. Dried sanguinous crust noted to all 3 digits. Wounds do not probe to bone, tunnel, or track. No fluctuance, crepitus, malodor, or drainage noted. No acute signs of infection noted. MUSCULOSKELETAL: Muscle strength is 5/5 for all pedal groups tested. No pain with palpation of the foot or ankle b/l. Ankle joint ROM is decreased in dorsiflexion with the knee extended. History of TMA, right lower extremity. History of partial hallux and partial second digit amputation, left lower extremity. IMAGING  Right lower extremity foot x-ray; 4/14/2022  Narrative   History: Right foot wound. Right foot infection.       3 views right foot.       FINDINGS: Previous transmetatarsal amputation across distal metatarsals. Osseous structures appear not appreciably changed in comparison to 2/14/2022. No soft tissue gas or discrete erosive abnormality identified.           Impression   1. Prior amputation with no suspicious osseous abnormality.         Left lower extremity foot x-ray; 4/14/2020  Narrative   History: Left foot infection.  Left foot pain.       3 views left foot.       FINDINGS: Prior amputation of the first and second digits across the proximal phalanx level. No fracture. No subluxation. Equivocal lysis at the tip of the amputated first proximal phalanx. Erosive changes involving the tip of the third distal phalanx. Findings suspicious for possible osteomyelitis.           Impression   1. Findings suspicious for osteomyelitis at the tip of the first proximal phalanx and at the tip of the third distal phalanx. ASSESSMENT  -Osteomyelitis first proximal phalanx and distal phalanx third digit; left foot  -Full-thickness ulcerations, right lower extremity[place] stage II  -Superficial abrasions; bilateral lower extremities  -Diabetes mellitus type 2 with peripheral neuropathy  -History of amputations; bilateral lower extremity    PLAN  - Evaluation and management x 15 minutes and greater than 50% of the time spent explaining the etiology and treatment with the patient.   -Imaging reviewed with patient, impression as noted above  -Right lower extremity dressed with Betadine, DSD, and Ace  -Left lower extremity dressed with Betadine to the toes  -Instructed patient to continue taking his antibiotics and finish them to completion  -Home health care order given to patient for every other day dressing changes to bilateral lower extremities  -Surgery sheet signed by patient for TMA to left lower extremity. Date will be decided upon OR and attending availability  -Instructed patient to look for signs of infection including but not limited to increased swelling, drainage, redness streaking up the leg and constitutional symptoms. If patient to develop the symptoms told to go to the nearest emergency department for further work-up and evaluation.  -Patient to return to clinic in 3 weeks for wound recheck and surgical consent    Assessment and plan was discussed with BETTIE Goins.P.JONATHAN Hoover DPM   Podiatric Resident PGY1  4/16/2022, 11:12 AM

## 2022-04-18 LAB
BLOOD CULTURE, ROUTINE: NORMAL
CULTURE, BLOOD 2: NORMAL

## 2022-04-19 ENCOUNTER — TELEPHONE (OUTPATIENT)
Dept: INTERNAL MEDICINE CLINIC | Age: 42
End: 2022-04-19

## 2022-04-19 NOTE — TELEPHONE ENCOUNTER
Left message we are unable to get [facility] due to lack of staff or they don't take his insurance. Dr. Devorah Muñiz states he can change the dressings himself for now . I did send dressings supplies  home with him  The day he was in clinic and he can buy more at any medical supply . I requested he call me back.

## 2022-04-19 NOTE — TELEPHONE ENCOUNTER
I have called 10 home health services . None can take him, either due to staffing or they don't take his insurance. NOW home [facility] is to call me back to let me know if they can take him.

## 2022-04-22 ENCOUNTER — TELEPHONE (OUTPATIENT)
Dept: INTERNAL MEDICINE CLINIC | Age: 42
End: 2022-04-22

## 2022-04-22 NOTE — TELEPHONE ENCOUNTER
Left another message I was unable to find a home health  To do his dressing changes. Instructed him Dr. Satya Burton ordered he continue to do  his his dressing changes .  I had sent dressing supplies  With him when he was here last.

## 2022-05-09 RX ORDER — NAPROXEN 500 MG/1
500 TABLET ORAL 2 TIMES DAILY WITH MEALS
COMMUNITY
Start: 2022-03-25

## 2022-05-09 RX ORDER — ESCITALOPRAM OXALATE 20 MG/1
20 TABLET ORAL 2 TIMES DAILY
COMMUNITY

## 2022-05-09 RX ORDER — BUPRENORPHINE AND NALOXONE 12; 3 MG/1; MG/1
1 FILM, SOLUBLE BUCCAL; SUBLINGUAL DAILY
COMMUNITY

## 2022-05-09 RX ORDER — BUPROPION HYDROCHLORIDE 150 MG/1
100 TABLET ORAL EVERY MORNING
COMMUNITY

## 2022-05-09 NOTE — PROGRESS NOTES
[facility] PRE-SURGICAL TESTING INSTRUCTIONS                                  PRIOR TO PROCEDURE DATE:        1. PLEASE FOLLOW ANY  GUIDELINES/ INSTRUCTIONS PRIOR TO YOUR PROCEDURE AS ADVISED BY YOUR SURGEON. 2. Arrange for someone to drive you home and be with you for the first 24 hours after discharge for your safety after your procedure for which you received sedation. Ensure it is someone we can share information with regarding your discharge. 3. You must contact your surgeon for instructions IF:   You are taking any blood thinners, aspirin, anti-inflammatory or vitamin E.   There is a change in your physical condition such as a cold, fever, rash, cuts, sores or any other infection, especially near your surgical site. 4. Do not drink alcohol the day before or day of your procedure. 5. A Pre-op History and Physical for surgery MUST be completed by your Physician or Urgent Care within 30 days of your procedure date. Please bring a copy with you on the day of your procedure and along with any other testing performed. THE DAY OF YOUR PROCEDURE:  1. Follow instructions for ARRIVAL TIME as DIRECTED BY YOUR SURGEON. 2. Enter the MAIN entrance from [address] and follow the signs to [facility] (offered free of charge 6am-5pm). 3. Enter the Main Entrance of the hospital (do not enter from the lower level of the parking garage). Upon entrance, check in with the  at the main desk on your left. If no one is available at the desk, proceed into the [facility] Waiting Room and go through the door directly into the [facility]. There is a Check-in desk ACROSS from Room 5 (marked with a sign hanging from the ceiling). The phone number for the surgery center is [phone]. 4. Please call [phone] option #2 option #2 if you have not been preregistered yet.   On the day of your procedure bring your insurance card and photo ID. You will be registered at your bedside once brought back to your room. 5. DO NOT EAT ANYTHING eight hours prior to your arrival for surgery. May have 8 ounces of water 4 hours prior to your arrival for surgery. NOTE: ALL Gastric, Bariatric and Bowel surgery patients MUST follow their surgeon's instructions. 6. MEDICATIONS    Take the following medications with a SMALL sip of water: MAY HAVE SUBOXONE AND GABAPENTIN AND OMEPRAZOLE    Bariatric patient's call surgeon if on diabetic medications as some need to be stopped 1 week preop   Use your usual dose of inhalers the morning of surgery. BRING your rescue inhaler with you to hospital.    Anesthesia does NOT want you to take insulin the morning of surgery. They will control your blood sugar while you are at the hospital. Please contact your ordering physician for instructions regarding your insulin the night before your procedure. If you have an insulin pump, please keep it set on basal rate. 7. Do not swallow water when brushing teeth. No gum, candy, mints or ice chips. Refrain from smoking or at least decrease the amount. 8. Dress in loose, comfortable clothing appropriate for redressing after your procedure. Do not wear jewelry (including body piercings), make-up (especially NO eye make-up), fingernail polish (NO toenail polish if foot/leg surgery), lotion, powders or metal hairclips. 9. Dentures, glasses, or contacts will need to be removed before your procedure. Bring cases for your glasses, contacts, dentures, or hearing aids to protect them while you are in surgery. 10. If you use a CPAP, please bring it with you on the day of your procedure. 11. We recommend that valuable personal  belongings such as cash, cell phones, e-tablets or jewelry, be left at home during your stay. The hospital will not be responsible for valuables that are not secured in the hospital safe.  However, if your insurance requires a co-pay, you may want to bring a method of payment, i.e. Check or credit card, if you wish to pay your co-pay the day of surgery. 12. If you are to stay overnight, you may bring a bag with personal items. Please have any large items you may need brought in by your family after your arrival to your hospital room. 15. If you have a Living Will or Durable Power of , please bring a copy on the day of your procedure. 15. With your permission, one family member may accompany you while you are being prepared for surgery. Once you are ready, additional family members may join you. HOW WE KEEP YOU SAFE and WORK TO PREVENT SURGICAL SITE INFECTIONS:  1. Health care workers should always check your ID bracelet to verify your name and birth date. You will be asked many times to state your name, date of birth, and allergies. 2. Health care workers should always clean their hands with soap or alcohol gel before providing care to you. It is okay to ask anyone if they cleaned their hands before they touch you. 3. You will be actively involved in verifying the type of procedure you are having and ensuring the correct surgical site. This will be confirmed multiple times prior to your procedure. Do NOT cm your surgery site UNLESS instructed to by your surgeon. 4. Do not shave or wax for 72 hours prior to procedure near your operative site. Shaving with a razor can irritate your skin and make it easier to develop an infection. On the day of your procedure, any hair that needs to be removed near the surgical site will be clipped by a healthcare worker using a special clippers designed to avoid skin irritation. 5. When you are in the operating room, your surgical site will be cleansed with a special soap, and in most cases, you will be given an antibiotic before the surgery begins. What to expect AFTER YOUR PROCEDURE:  1.  Immediately following your procedure, your will be taken to the PACU for the first phase of your recovery. Your nurse will help you recover from any potential side effects of anesthesia, such as extreme drowsiness, changes in your vital signs or breathing patterns. Nausea, headache, muscle aches, or sore throat may also occur after anesthesia. Your nurse will help you manage these potential side effects. 2. For comfort and safety, arrange to have someone at home with you for the first 24 hours after discharge. 3. You and your family will be given written instructions about your diet, activity, dressing care, medications, and return visits. 4. Once at home, should issues with nausea, pain, or bleeding occur, or should you notice any signs of infection, you should call your surgeon. 5. Always clean your hands before and after caring for your wound. Do not let your family touch your surgery site without cleaning their hands. 6. Narcotic pain medications can cause significant constipation. You may want to add a stool softener to your postoperative medication schedule or speak to your surgeon on how best to manage this SIDE EFFECT. SPECIAL INSTRUCTIONS     Thank you for allowing us to care for you. We strive to exceed your expectations in the delivery of care and service provided to you and your family. If you need to contact the [place] [address] staff for any reason, please call us at [phone]    Instructions reviewed with patient during preadmission testing phone interview.   Davin Munguia RN.5/9/2022 .3:25 PM      ADDITIONAL EDUCATIONAL INFORMATION REVIEWED PER PHONE WITH YOU AND/OR YOUR FAMILY:  No Hibiclens® Bathing Instructions   Yes Antibacterial Soap

## 2022-05-09 NOTE — PROGRESS NOTES
5/9 Patient allergic to cefazolin - notified Guy Patel at [facility] office. Need new order.  Anuja Ruff

## 2022-05-09 NOTE — PROGRESS NOTES
Place patient label inside box (if no patient label, complete below)  Name:  :  MR#:   Hattie Medico / PROCEDURE  I (we),  David Will (Patient Name) authorize DR. Halley Crouch  (Provider / Izora Counter) and/or such assistants as may be selected by him/her, to perform the following operation/procedure(s): ON THE LEFT: TRANSMETATARSAL AMPUTATION;;TENDO ACHILLES LENGTHENING LEFT LOWER EXTREMITY       Note: If unable to obtain consent prior to an emergent procedure, document the emergent reason in the medical record. This procedure has been explained to my (our) satisfaction and included in the explanation was:  A) The intended benefit, nature, and extent of the procedure to be performed;  B) The significant risks involved and the probability of success;  C) Alternative procedures and methods of treatment;  D) The dangers and probable consequences of such alternatives (including no procedure or treatment); E) The expected consequences of the procedure on my future health;  F) Whether other qualified individuals would be performing important surgical tasks and/or whether  would be present to advise or support the procedure. I (we) understand that there are other risks of infection and other serious complications in the pre-operative/procedural and postoperative/procedural stages of my (our) care. I (we) have asked all of the questions which I (we) thought were important in deciding whether or not to undergo treatment or diagnosis. These questions have been answered to my (our) satisfaction. I (we) understand that no assurance can be given that the procedure will be a success, and no guarantee or warranty of success has been given to me (us).     1. It has been explained to me (us) that during the course of the operation/procedure, unforeseen conditions may be revealed that necessitate extension of the original procedure(s) or different procedure(s) than those set forth in Paragraph 1. I (we) authorize and request that the above-named physician, his/her assistants or his/her designees, perform procedures as necessary and desirable if deemed to be in my (our) best interest.     Revised 8/2/2021                                                                          Page 1 of 2       2. I acknowledge that health care personnel may be observing this procedure for the purpose of medical education or other specified purposes as may be necessary as requested and/or approved by my (our) physician. 3. I (we) consent to the disposal by the hospital Pathologist of the removed tissue, parts or organs in accordance with hospital policy. 4. I do ____ do not ____ consent to the use of a local infiltration pain blocking agent that will be used by my provider/surgical provider to help alleviate pain during my procedure. 5. I do ____ do not ____ consent to an emergent blood transfusion in the case of a life-threatening situation that requires blood components to be administered. This consent is valid for 24 hours from the beginning of the procedure. 6. This patient does ____ or does not ____ currently have a DNR status/order. If DNR order is in place, obtain Addendum to the Surgical Consent for ALL Patients with a DNR Order to address nilda-operative status for limited intervention or DNR suspension.      7. I have read and fully understand the above Consent for Operation/Procedure and that all blanks were completed before I signed the consent.   _____________________________       _____________________      ____/____am/pm  Signature of Patient or legal representative      Printed Name / Relationship            Date / Time   ____________________________       _____________________      ____/____am/pm  Witness to Signature                                    Printed Name                    Date / Time     If patient is unable to sign or is a minor, complete the following)  Patient is a minor, ____ years of age, or unable to sign because:   ______________________________________________________________________________________________    [facility] If a phone consent is obtained, consent will be documented by using two health care professionals, each affirming that the consenting party has no questions and gives consent for the procedure discussed with the physician/provider.   _____________________          ____________________       _____/_____am/pm   2nd witness to phone consent        Printed name           Date / Time    Informed Consent:  I have provided the explanation described above in section 1 to the patient and/or legal representative.  I have provided the patient and/or legal representative with an opportunity to ask any questions about the proposed operation/procedure.   ___________________________          ____________________         ____/____am/pm  Provider / Proceduralist                            Printed name            Date / Time  Revised 8/2/2021                                                                      Page 2 of 2

## 2022-05-10 ENCOUNTER — TELEPHONE (OUTPATIENT)
Dept: INTERNAL MEDICINE CLINIC | Age: 42
End: 2022-05-10

## 2022-05-10 NOTE — TELEPHONE ENCOUNTER
Patient notified to com e this Friday for pod clinic for pre-op. He agrees .  States will get history and physical tomorrow

## 2022-05-13 ENCOUNTER — OFFICE VISIT (OUTPATIENT)
Dept: INTERNAL MEDICINE CLINIC | Age: 42
End: 2022-05-13
Payer: COMMERCIAL

## 2022-05-13 ENCOUNTER — ANESTHESIA EVENT (OUTPATIENT)
Dept: OPERATING ROOM | Age: 42
End: 2022-05-13
Payer: COMMERCIAL

## 2022-05-13 VITALS — TEMPERATURE: 97 F

## 2022-05-13 DIAGNOSIS — M86.9 TOE OSTEOMYELITIS, LEFT (HCC): Primary | ICD-10-CM

## 2022-05-13 DIAGNOSIS — M86.9 OSTEOMYELITIS OF LEFT FOOT, UNSPECIFIED TYPE (HCC): ICD-10-CM

## 2022-05-13 DIAGNOSIS — E11.621 TYPE 2 DIABETES MELLITUS WITH LEFT DIABETIC FOOT ULCER (HCC): ICD-10-CM

## 2022-05-13 DIAGNOSIS — L89.899 PRESSURE ULCER OF RIGHT LOWER EXTREMITY: ICD-10-CM

## 2022-05-13 DIAGNOSIS — L97.529 TYPE 2 DIABETES MELLITUS WITH LEFT DIABETIC FOOT ULCER (HCC): ICD-10-CM

## 2022-05-13 PROCEDURE — 99213 OFFICE O/P EST LOW 20 MIN: CPT

## 2022-05-15 NOTE — PROGRESS NOTES
Department of Podiatry  Resident Progress Note    Lizbeth Pelayo  Allergies: Cephalexin and Liraglutide    SUBJECTIVE  The patient is a 35 y.o. male who presents to clinic for wound check and to sign preop paperwork. Patient states that he has been compliant with his daily dressing changes of Betadine to his bilateral lower extremities. He states that the wounds having gotten better nor have they gotten worse. He denies any new drainage to the wounds to his bilateral lower extremities. He states that he finished his antibiotics to completion a couple weeks ago. Patient denies any other pedal complaints today. Patient denies any nausea, vomiting, fever, chills, shortness of breath. Past Medical History:        Diagnosis Date    Depression     Diabetes mellitus ([facility]     Hyperlipidemia     Hypertension     Osteoarthritis of both knees     Osteomyelitis ([facility]     FOOT        REVIEW OF SYSTEMS:  As Above    OBJECTIVE  Patient presents to clinic today unaccompanied and unassisted with surgical shoe to the right lower extremity and flip-flops to the left lower extremity. Patient is in no acute distress. Patient is alert and oriented x3. VASCULAR:   DP and PT pulses are nonpalpable 0/4 bilateral lower extremity. Upon hand-held Doppler examination biphasic signal noted to DP and PT pulses. CFT is brisk to distal TMA stump on the right and the remaining digits on the left foot. Skin temperature is warm to cool from proximal to distal with no focal calor noted. No edema noted. No pain with calf compression b/l. NEUROLOGIC: Gross and epicritic sensation is diminished b/l. Protective sensation is diminished at all pedal sites b/l. DERMATOLOGIC:   Right lower extremity:  Full-thickness ulceration of the lateral aspect of the TMA site. Wound measures approximately 1.9 cm x 1.4 cm x 0.1 cm. Wound base is a mixture of fibrotic and granular tissue. The wound does not probe to bone, tunnel, or tract.   No fluctuance, crepitus, malodor, or drainage noted. No acute signs of infection noted. Full-thickness ulceration noted to the lateral aspect of the TMA site just proximal to the above-noted wound. Wound measures approximately 1.4 cm x 1.0 cm x 0.1 cm. Wound base is a mixture of fibrotic and granular tissue. Wound does not probe to bone, tunnel, or track. No fluctuance, crepitus, malodor, or drainage noted. No acute signs of infection noted. Left lower extremity:  Multiple superficial abrasions noted to the third fourth and fifth digits. Dried sanguinous crust noted to all 3 digits. Wounds do not probe to bone, tunnel, or track. No fluctuance, crepitus, malodor, or drainage noted. No acute signs of infection noted. MUSCULOSKELETAL: Muscle strength is 5/5 for all pedal groups tested. No pain with palpation of the foot or ankle b/l. Ankle joint ROM is decreased in dorsiflexion with the knee extended. History of TMA, right lower extremity. History of partial hallux and partial second digit amputation, left lower extremity. IMAGING  Right lower extremity foot x-ray; 4/14/2022  Narrative   History: Right foot wound. Right foot infection.       3 views right foot.       FINDINGS: Previous transmetatarsal amputation across distal metatarsals. Osseous structures appear not appreciably changed in comparison to 2/14/2022. No soft tissue gas or discrete erosive abnormality identified.           Impression   1. Prior amputation with no suspicious osseous abnormality.         Left lower extremity foot x-ray; 4/14/2020  Narrative   History: Left foot infection. Left foot pain.       3 views left foot.       FINDINGS: Prior amputation of the first and second digits across the proximal phalanx level. No fracture. No subluxation. Equivocal lysis at the tip of the amputated first proximal phalanx. Erosive changes involving the tip of the third distal phalanx.     Findings suspicious for possible osteomyelitis.         Impression   1. Findings suspicious for osteomyelitis at the tip of the first proximal phalanx and at the tip of the third distal phalanx. ASSESSMENT  -Osteomyelitis first proximal phalanx and distal phalanx third digit; left foot  -Full-thickness ulcerations, right lower extremity[place] stage II  -Superficial abrasions; bilateral lower extremities  -Diabetes mellitus type 2 with peripheral neuropathy  -History of amputations; bilateral lower extremity    PLAN  - Evaluation and management x 15 minutes and greater than 50% of the time spent explaining the etiology and treatment with the patient.   -Imaging reviewed with patient, impression as noted above  -Right lower extremity dressed with Betadine, DSD, and Ace  -Left lower extremity dressed with Betadine to the toes  -Instructed patient to look for signs of infection including but not limited to increased swelling, drainage, redness streaking up the leg and constitutional symptoms. If patient to develop the symptoms told to go to the nearest emergency department for further work-up and evaluation.  -Patient signed consent form for transmetatarsal amputation and tendo Achilles lengthening for his left lower extremity. All questions and concerns were addressed during this visit.  -Patient to return to clinic in 1 week for first postoperative visit    Assessment and plan was discussed with HERBERT CastPSAMEERA.     Sienna Amaral DPM   Podiatric Resident PGY1  5/15/2022, 6:59 PM

## 2022-05-16 ENCOUNTER — APPOINTMENT (OUTPATIENT)
Dept: GENERAL RADIOLOGY | Age: 42
End: 2022-05-16
Attending: PODIATRIST
Payer: COMMERCIAL

## 2022-05-16 ENCOUNTER — TELEPHONE (OUTPATIENT)
Dept: INTERNAL MEDICINE CLINIC | Age: 42
End: 2022-05-16

## 2022-05-16 ENCOUNTER — HOSPITAL ENCOUNTER (OUTPATIENT)
Age: 42
Setting detail: OUTPATIENT SURGERY
Discharge: HOME | End: 2022-05-16
Attending: PODIATRIST | Admitting: PODIATRIST
Payer: COMMERCIAL

## 2022-05-16 ENCOUNTER — ANESTHESIA (OUTPATIENT)
Dept: OPERATING ROOM | Age: 42
End: 2022-05-16
Payer: COMMERCIAL

## 2022-05-16 VITALS
RESPIRATION RATE: 16 BRPM | DIASTOLIC BLOOD PRESSURE: 88 MMHG | WEIGHT: 312.8 LBS | HEIGHT: 75 IN | OXYGEN SATURATION: 99 % | BODY MASS INDEX: 38.89 KG/M2 | TEMPERATURE: 97.8 F | HEART RATE: 81 BPM | SYSTOLIC BLOOD PRESSURE: 153 MMHG

## 2022-05-16 DIAGNOSIS — R11.2 POST-OPERATIVE NAUSEA AND VOMITING: ICD-10-CM

## 2022-05-16 DIAGNOSIS — Z98.890 POST-OPERATIVE NAUSEA AND VOMITING: ICD-10-CM

## 2022-05-16 DIAGNOSIS — M86.172 ACUTE OSTEOMYELITIS OF LEFT FOOT (HCC): ICD-10-CM

## 2022-05-16 DIAGNOSIS — M67.01 CONTRACTURE OF RIGHT ACHILLES TENDON: ICD-10-CM

## 2022-05-16 DIAGNOSIS — Z29.9 DVT PROPHYLAXIS: Primary | ICD-10-CM

## 2022-05-16 DIAGNOSIS — G89.18 POST-OP PAIN: ICD-10-CM

## 2022-05-16 LAB
GLUCOSE BLD-MCNC: 161 MG/DL (ref 70–99)
GLUCOSE BLD-MCNC: 218 MG/DL (ref 70–99)
PERFORMED ON: ABNORMAL
PERFORMED ON: ABNORMAL

## 2022-05-16 PROCEDURE — 2580000003 HC RX 258: Performed by: NURSE ANESTHETIST, CERTIFIED REGISTERED

## 2022-05-16 PROCEDURE — 6360000002 HC RX W HCPCS: Performed by: NURSE ANESTHETIST, CERTIFIED REGISTERED

## 2022-05-16 PROCEDURE — 73630 X-RAY EXAM OF FOOT: CPT

## 2022-05-16 PROCEDURE — 3600000014 HC SURGERY LEVEL 4 ADDTL 15MIN: Performed by: PODIATRIST

## 2022-05-16 PROCEDURE — 3700000000 HC ANESTHESIA ATTENDED CARE: Performed by: PODIATRIST

## 2022-05-16 PROCEDURE — 3700000001 HC ADD 15 MINUTES (ANESTHESIA): Performed by: PODIATRIST

## 2022-05-16 PROCEDURE — 2709999900 HC NON-CHARGEABLE SUPPLY: Performed by: PODIATRIST

## 2022-05-16 PROCEDURE — 7100000011 HC PHASE II RECOVERY - ADDTL 15 MIN: Performed by: PODIATRIST

## 2022-05-16 PROCEDURE — 3600000004 HC SURGERY LEVEL 4 BASE: Performed by: PODIATRIST

## 2022-05-16 PROCEDURE — 64447 NJX AA&/STRD FEMORAL NRV IMG: CPT | Performed by: ANESTHESIOLOGY

## 2022-05-16 PROCEDURE — 7100000001 HC PACU RECOVERY - ADDTL 15 MIN: Performed by: PODIATRIST

## 2022-05-16 PROCEDURE — 6360000002 HC RX W HCPCS: Performed by: ANESTHESIOLOGY

## 2022-05-16 PROCEDURE — 76942 ECHO GUIDE FOR BIOPSY: CPT | Performed by: ANESTHESIOLOGY

## 2022-05-16 PROCEDURE — 2500000003 HC RX 250 WO HCPCS: Performed by: NURSE ANESTHETIST, CERTIFIED REGISTERED

## 2022-05-16 PROCEDURE — 6370000000 HC RX 637 (ALT 250 FOR IP): Performed by: ANESTHESIOLOGY

## 2022-05-16 PROCEDURE — 88311 DECALCIFY TISSUE: CPT

## 2022-05-16 PROCEDURE — 7100000000 HC PACU RECOVERY - FIRST 15 MIN: Performed by: PODIATRIST

## 2022-05-16 PROCEDURE — 7100000010 HC PHASE II RECOVERY - FIRST 15 MIN: Performed by: PODIATRIST

## 2022-05-16 PROCEDURE — C1889 IMPLANT/INSERT DEVICE, NOC: HCPCS | Performed by: PODIATRIST

## 2022-05-16 PROCEDURE — 2500000003 HC RX 250 WO HCPCS: Performed by: ANESTHESIOLOGY

## 2022-05-16 PROCEDURE — 88307 TISSUE EXAM BY PATHOLOGIST: CPT

## 2022-05-16 DEVICE — PATCH AMNION 2 LAYR PROTCT 4 X 4CM STERISHIELD II: Type: IMPLANTABLE DEVICE | Site: FOOT | Status: FUNCTIONAL

## 2022-05-16 RX ORDER — ROPIVACAINE HYDROCHLORIDE 5 MG/ML
INJECTION, SOLUTION EPIDURAL; INFILTRATION; PERINEURAL PRN
Status: DISCONTINUED | OUTPATIENT
Start: 2022-05-16 | End: 2022-05-16 | Stop reason: SDUPTHER

## 2022-05-16 RX ORDER — PROPOFOL 10 MG/ML
INJECTION, EMULSION INTRAVENOUS PRN
Status: DISCONTINUED | OUTPATIENT
Start: 2022-05-16 | End: 2022-05-16 | Stop reason: SDUPTHER

## 2022-05-16 RX ORDER — ONDANSETRON 2 MG/ML
INJECTION INTRAMUSCULAR; INTRAVENOUS PRN
Status: DISCONTINUED | OUTPATIENT
Start: 2022-05-16 | End: 2022-05-16 | Stop reason: SDUPTHER

## 2022-05-16 RX ORDER — LABETALOL HYDROCHLORIDE 5 MG/ML
10 INJECTION, SOLUTION INTRAVENOUS
Status: DISCONTINUED | OUTPATIENT
Start: 2022-05-16 | End: 2022-05-16 | Stop reason: HOSPADM

## 2022-05-16 RX ORDER — ONDANSETRON 2 MG/ML
4 INJECTION INTRAMUSCULAR; INTRAVENOUS
Status: DISCONTINUED | OUTPATIENT
Start: 2022-05-16 | End: 2022-05-16 | Stop reason: HOSPADM

## 2022-05-16 RX ORDER — OXYCODONE AND ACETAMINOPHEN 7.5; 325 MG/1; MG/1
1 TABLET ORAL EVERY 8 HOURS PRN
Qty: 42 TABLET | Refills: 0 | Status: SHIPPED | OUTPATIENT
Start: 2022-05-16 | End: 2022-06-06

## 2022-05-16 RX ORDER — FENTANYL CITRATE 50 UG/ML
INJECTION, SOLUTION INTRAMUSCULAR; INTRAVENOUS PRN
Status: DISCONTINUED | OUTPATIENT
Start: 2022-05-16 | End: 2022-05-16 | Stop reason: SDUPTHER

## 2022-05-16 RX ORDER — CLINDAMYCIN PHOSPHATE 900 MG/50ML
INJECTION INTRAVENOUS PRN
Status: DISCONTINUED | OUTPATIENT
Start: 2022-05-16 | End: 2022-05-16

## 2022-05-16 RX ORDER — PROMETHAZINE HYDROCHLORIDE 25 MG/1
25 TABLET ORAL EVERY 6 HOURS PRN
Qty: 28 TABLET | Refills: 0 | Status: SHIPPED | OUTPATIENT
Start: 2022-05-16 | End: 2022-05-23

## 2022-05-16 RX ORDER — MIDAZOLAM HYDROCHLORIDE 1 MG/ML
INJECTION INTRAMUSCULAR; INTRAVENOUS PRN
Status: DISCONTINUED | OUTPATIENT
Start: 2022-05-16 | End: 2022-05-16 | Stop reason: SDUPTHER

## 2022-05-16 RX ORDER — MIDAZOLAM HYDROCHLORIDE 1 MG/ML
INJECTION INTRAMUSCULAR; INTRAVENOUS
Status: COMPLETED
Start: 2022-05-16 | End: 2022-05-16

## 2022-05-16 RX ORDER — SODIUM CHLORIDE 9 MG/ML
INJECTION, SOLUTION INTRAVENOUS CONTINUOUS PRN
Status: DISCONTINUED | OUTPATIENT
Start: 2022-05-16 | End: 2022-05-16 | Stop reason: SDUPTHER

## 2022-05-16 RX ORDER — PROPOFOL 10 MG/ML
INJECTION, EMULSION INTRAVENOUS CONTINUOUS PRN
Status: DISCONTINUED | OUTPATIENT
Start: 2022-05-16 | End: 2022-05-16 | Stop reason: SDUPTHER

## 2022-05-16 RX ORDER — SODIUM CHLORIDE, SODIUM LACTATE, POTASSIUM CHLORIDE, CALCIUM CHLORIDE 600; 310; 30; 20 MG/100ML; MG/100ML; MG/100ML; MG/100ML
INJECTION, SOLUTION INTRAVENOUS CONTINUOUS
Status: DISCONTINUED | OUTPATIENT
Start: 2022-05-16 | End: 2022-05-16 | Stop reason: HOSPADM

## 2022-05-16 RX ORDER — CLINDAMYCIN PHOSPHATE 900 MG/50ML
INJECTION INTRAVENOUS PRN
Status: DISCONTINUED | OUTPATIENT
Start: 2022-05-16 | End: 2022-05-16 | Stop reason: SDUPTHER

## 2022-05-16 RX ORDER — HYDRALAZINE HYDROCHLORIDE 20 MG/ML
10 INJECTION INTRAMUSCULAR; INTRAVENOUS
Status: DISCONTINUED | OUTPATIENT
Start: 2022-05-16 | End: 2022-05-16 | Stop reason: HOSPADM

## 2022-05-16 RX ORDER — SODIUM CHLORIDE 0.9 % (FLUSH) 0.9 %
5-40 SYRINGE (ML) INJECTION PRN
Status: DISCONTINUED | OUTPATIENT
Start: 2022-05-16 | End: 2022-05-16 | Stop reason: HOSPADM

## 2022-05-16 RX ORDER — ROPIVACAINE HYDROCHLORIDE 5 MG/ML
INJECTION, SOLUTION EPIDURAL; INFILTRATION; PERINEURAL
Status: COMPLETED
Start: 2022-05-16 | End: 2022-05-16

## 2022-05-16 RX ORDER — BUPIVACAINE HYDROCHLORIDE 5 MG/ML
INJECTION, SOLUTION EPIDURAL; INTRACAUDAL
Status: COMPLETED
Start: 2022-05-16 | End: 2022-05-16

## 2022-05-16 RX ORDER — MEPERIDINE HYDROCHLORIDE 25 MG/ML
12.5 INJECTION INTRAMUSCULAR; INTRAVENOUS; SUBCUTANEOUS EVERY 5 MIN PRN
Status: DISCONTINUED | OUTPATIENT
Start: 2022-05-16 | End: 2022-05-16 | Stop reason: HOSPADM

## 2022-05-16 RX ORDER — SODIUM CHLORIDE 9 MG/ML
INJECTION, SOLUTION INTRAVENOUS PRN
Status: DISCONTINUED | OUTPATIENT
Start: 2022-05-16 | End: 2022-05-16 | Stop reason: HOSPADM

## 2022-05-16 RX ORDER — OXYCODONE HYDROCHLORIDE 5 MG/1
5 TABLET ORAL
Status: COMPLETED | OUTPATIENT
Start: 2022-05-16 | End: 2022-05-16

## 2022-05-16 RX ORDER — FENTANYL CITRATE 50 UG/ML
25 INJECTION, SOLUTION INTRAMUSCULAR; INTRAVENOUS EVERY 5 MIN PRN
Status: DISCONTINUED | OUTPATIENT
Start: 2022-05-16 | End: 2022-05-16 | Stop reason: HOSPADM

## 2022-05-16 RX ORDER — BUPIVACAINE HYDROCHLORIDE 5 MG/ML
INJECTION, SOLUTION EPIDURAL; INTRACAUDAL PRN
Status: DISCONTINUED | OUTPATIENT
Start: 2022-05-16 | End: 2022-05-16 | Stop reason: SDUPTHER

## 2022-05-16 RX ORDER — SODIUM CHLORIDE 0.9 % (FLUSH) 0.9 %
5-40 SYRINGE (ML) INJECTION EVERY 12 HOURS SCHEDULED
Status: DISCONTINUED | OUTPATIENT
Start: 2022-05-16 | End: 2022-05-16 | Stop reason: HOSPADM

## 2022-05-16 RX ADMIN — BUPIVACAINE HYDROCHLORIDE 30 ML: 5 INJECTION, SOLUTION EPIDURAL; INTRACAUDAL; PERINEURAL at 12:15

## 2022-05-16 RX ADMIN — ROPIVACAINE HYDROCHLORIDE 20 ML: 5 INJECTION, SOLUTION EPIDURAL; INFILTRATION; PERINEURAL at 12:18

## 2022-05-16 RX ADMIN — ONDANSETRON 4 MG: 2 INJECTION INTRAMUSCULAR; INTRAVENOUS at 14:21

## 2022-05-16 RX ADMIN — OXYCODONE 5 MG: 5 TABLET ORAL at 14:55

## 2022-05-16 RX ADMIN — PROPOFOL 100 MG: 10 INJECTION, EMULSION INTRAVENOUS at 13:03

## 2022-05-16 RX ADMIN — FENTANYL CITRATE 25 MCG: 50 INJECTION, SOLUTION INTRAMUSCULAR; INTRAVENOUS at 13:18

## 2022-05-16 RX ADMIN — Medication 50 MG: at 13:03

## 2022-05-16 RX ADMIN — FENTANYL CITRATE 25 MCG: 50 INJECTION, SOLUTION INTRAMUSCULAR; INTRAVENOUS at 14:16

## 2022-05-16 RX ADMIN — MIDAZOLAM HYDROCHLORIDE 2 MG: 2 INJECTION, SOLUTION INTRAMUSCULAR; INTRAVENOUS at 12:09

## 2022-05-16 RX ADMIN — FENTANYL CITRATE 25 MCG: 50 INJECTION, SOLUTION INTRAMUSCULAR; INTRAVENOUS at 13:34

## 2022-05-16 RX ADMIN — PROPOFOL 100 MG: 10 INJECTION, EMULSION INTRAVENOUS at 13:10

## 2022-05-16 RX ADMIN — CLINDAMYCIN PHOSPHATE 900 MG: 18 INJECTION, SOLUTION INTRAVENOUS at 13:12

## 2022-05-16 RX ADMIN — SODIUM CHLORIDE: 9 INJECTION, SOLUTION INTRAVENOUS at 12:48

## 2022-05-16 RX ADMIN — FENTANYL CITRATE 25 MCG: 50 INJECTION, SOLUTION INTRAMUSCULAR; INTRAVENOUS at 13:53

## 2022-05-16 RX ADMIN — PROPOFOL 60 MCG/KG/MIN: 10 INJECTION, EMULSION INTRAVENOUS at 13:03

## 2022-05-16 ASSESSMENT — PAIN SCALES - GENERAL
PAINLEVEL_OUTOF10: 5
PAINLEVEL_OUTOF10: 2
PAINLEVEL_OUTOF10: 3
PAINLEVEL_OUTOF10: 5
PAINLEVEL_OUTOF10: 0

## 2022-05-16 ASSESSMENT — PAIN DESCRIPTION - DESCRIPTORS
DESCRIPTORS: ACHING

## 2022-05-16 ASSESSMENT — PAIN DESCRIPTION - PAIN TYPE: TYPE: SURGICAL PAIN

## 2022-05-16 ASSESSMENT — PAIN DESCRIPTION - LOCATION
LOCATION: FOOT

## 2022-05-16 ASSESSMENT — PAIN DESCRIPTION - ORIENTATION
ORIENTATION: LEFT

## 2022-05-16 NOTE — BRIEF OP NOTE
Brief Postoperative Note      Patient: Mirna Damon  YOB: 1980  MRN: [id number]    Date of Procedure: 5/16/2022    Pre-Op Diagnosis: Acute osteomyelitis of left foot ([address] [M86.172]; Equinus Left Lower Extremity [M67.01]    Post-Op Diagnosis: Same       Procedure(s):  96553- Transmetatarsal Amputation- Left Foot  63034- Tendo-Achilles Lengthening-Left Lower Extremity  75784- Application of Below Knee Splint- Left Lower Extremity    Surgeon(s):  Gege Rudolph DPM    Assistant:  Resident: Jonathan Madden DPM   Student: Ansley Summers MS-4    Anesthesia: General    Injectables: pre-op popliteal and saphenous block    Hemostasis: pneumatic ankle tourniquet at 250 mmHg for 62 minutes    Materials: Sterishield amnion 4x4 cm, 3-0 Vicryl, 3-0 Nylon    Estimated Blood Loss: minimal    Complications: None    Specimens:   ID Type Source Tests Collected by Time Destination   A : LEFT FOOT TRANSMETATARSAL AMPUTATION Specimen Foot SURGICAL PATHOLOGY Gege Rudolph DPM 5/16/2022 1349        Implants:  Implant Name Type Inv. Item Serial No.  Lot No. LRB No. Used Action   PATCH AMNION 2 LAYR PROTCT 4 X 4CM STERISHIELD II - [id number]  PATCH AMNION 2 LAYR PROTCT 4 X 4CM STERISHIELD II [id number] [employer]WD  Left 1 Implanted         Drains: * No LDAs found *    Findings: As expected see op-report. No purulent drainage encountered.      Electronically signed by Jonathan Madden DPM on 5/16/2022 at 2:30 PM

## 2022-05-16 NOTE — ANESTHESIA POSTPROCEDURE EVALUATION
Department of Anesthesiology  Postprocedure Note    Patient: Susana Brady  MRN: [id number]  YOB: 1980  Date of evaluation: 5/16/2022  Time:  3:18 PM     Procedure Summary     Date: 05/16/22 Room / Location: [address]    Anesthesia Start: 1301 Anesthesia Stop: 2149    Procedures:       ON THE LEFT: TRANSMETATARSAL AMPUTATION; (Left )      TENDO ACHILLES LENGTHENING LEFT LOWER EXTREMITY (Left ) Diagnosis:       Acute osteomyelitis of left foot ([address]      Contracture of right Achilles tendon      (Acute osteomyelitis of left foot ([address] [M86.172] Equinus Left Lower Extremity)    Surgeons: Mark Anthony Bryant DPM Responsible Provider: Kings Steinberg MD    Anesthesia Type: MAC, regional ASA Status: 3          Anesthesia Type: No value filed. Nasima Phase I: Nasima Score: 8    Nasima Phase II:      Last vitals: Reviewed and per EMR flowsheets.        Anesthesia Post Evaluation    Patient location during evaluation: PACU  Level of consciousness: awake  Complications: no  Multimodal analgesia pain management approach

## 2022-05-16 NOTE — PROGRESS NOTES
PACU Transfer to [facility]    Vitals:    05/16/22 1614   BP: (!) 153/88   Pulse: 81   Resp: 16   Temp: 97.8 °F (36.6 °C)   SpO2: 99%         Intake/Output Summary (Last 24 hours) at 5/16/2022 1618  Last data filed at 5/16/2022 1438  Gross per 24 hour   Intake 800 ml   Output 25 ml   Net 775 ml       Pain assessment:  VS stable. Pain level tolerable. No nausea present. Patient to [facility] bed#6 for discharge. Mother called to  patient for discharge.    Pain Level: 3    Patient transferred to care of [facility] RN.    5/16/2022 4:18 PM

## 2022-05-16 NOTE — OP NOTE
Operative Note      Patient: Luis Taylor  YOB: 1980  MRN: [id number]     Date of Procedure: 5/16/2022     Pre-Op Diagnosis: Acute osteomyelitis of left foot ([address] [M86.172]; Equinus Left Lower Extremity [M67.01]     Post-Op Diagnosis: Same       Procedure(s):  77255- Transmetatarsal amputation- Left foot  29281- Tendo-Achilles Lengthening-Left lower extremity     Surgeon(s):  Monae Baeza DPM     Assistant:  Resident: Gopal Barron DPM   Student: Florinda Nolasco, MS-4     Anesthesia: General     Injectables: pre-op popliteal and saphenous block     Hemostasis: pneumatic ankle tourniquet at 250 mmHg for 62 minutes     Materials: Sterishield amnion 4x4 cm, 3-0 Vicryl, 3-0 Nylon     Estimated Blood Loss: minimal     Complications: None     Specimens:   ID Type Source Tests Collected by Time Destination   A : LEFT FOOT TRANSMETATARSAL AMPUTATION Specimen Foot SURGICAL PATHOLOGY Monae Baeza DPM 5/16/2022 1349           Implants:  Implant Name Type Inv. Item Serial No.  Lot No. LRB No. Used Action   PATCH AMNION 2 LAYR PROTCT 4 X 4CM STERISHIELD II - [id number]   PATCH AMNION 2 LAYR PROTCT 4 X 4CM STERISHIELD II [id number] [employer]WD   Left 1 Implanted          Drains: * No LDAs found *     Findings: As expected see op-report. No purulent drainage encountered. INDICATIONS FOR PROCEDURE: This patient has signs and symptoms clinically and radiographically consistent with the above mentioned preoperative diagnosis. Having failed conservative treatment, it was determined that the patient would benefit from surgical intervention. All potential risks, benefits, and complications were discussed with the patient prior to the scheduling of surgery. All the patient's questions were answered and no guarantees were given. The patient wished to proceed with surgery, and informed written consent was obtained.      DETAILS OF PROCEDURE: The patient was brought from the pre-operative area and placed on the operating table in the supine position. A pneumatic calf tourniquet was placed around the patient's well-padded left lower extremity. Following IV sedation, The left lower extremity was then scrubbed, prepped, and draped in the usual sterile fashion. A time-out was performed. The patient, procedure, and operative site were confirmed. An Esmarch bandage was then utilized to exsanguinate the patient's left lower extremity. The tourniquet was then inflated to 250 mmHg and the following procedure was performed. PROCEDURE #1 LEFT TRANSMETATARSAL AMPUTATION:  Attention was then directed towards the left foot where using a #15 blade, a full thickness modified fishmouth incision was made just proximal to the dorsal sulcus of the toes with the plantar lip extending further distally. Using electrocautery dissection the extensor tendons were transected and the the incision was carried down to the level of bone until the dorsal aspect of the metatarsals were encountered. All bleeders were ligated using electrocautery as encountered. Next, using a sagittal saw and #138 blade, the distal 1/3 metatarsals 1-5 were resected with care being taken to maintain a normal metatarsal parabola. These portions of metatarsals along with their corresponding lesser digits were then sharply dissected free from their soft tissue attachments and passed from the operating field. The resected metatarsals was sent to pathology for histological identification. At this time, the plantar flap was examined and noted to be excellent and adequate for closure. Next, the remaining extensor and flexor tendons were grasped and cut as far proximal as possible to help prevent the tissue from serving as a possible nidus for future infection.   Finally, the plantar plates were identified and dissected free and passed from the operative field as well with great care being taken to preserve as much plantar subcutaneous tissue as possible. The surgical wound was then irrigated using copious amounts of normal saline. Again, the plantar flap from the modified fish-mouth incision was inspected and noted to be free of necortic soft tissue. The soft tissue was healthy in appearance and the skin edges to the dorsal and plantar flap were noted to be bleeding adequately. The plantar flap was then rotated dorsally and the redundant and extra tissue removed using a #15 blade. The subcutaneous tissues were re-approximated using 3-0 vicryl. The skin was then closed using 3-0 Nylon in a simple suture technique. PROCEDURE #2 LEFT TENDO-ACHILLES LENGTHENING:  At this time, attention was directed to the patient's left foot where an Achilles tendon contracture was noted. At this time, 3 sequential staggered stab incisions were made with a #15 blade along the posterior aspect of the Achilles tendon to canelo-transect the tendon beginning 1 cm proximal to the insertion point. Once these incisions were made, the foot was dorsiflexed and the tendon was able to slide upon itself into a lengthened position. These wounds were then closed with 3-0 Nylon suture in a simple interrupted suture technique. The pneumatic calf tourniquet was rapidly deflated, after a total time of 62 minutes, and a prompt instantaneous hyperemic response was noted on all aspects of the patients TMA stump of the left lower extremity. Next, polysporin and adaptic were placed over top of the incision sites. Next, copious amounts of cast padding was applied to the patient's left lower extremity from the metatarsal heads to approximately 3 finger breaths distal to the head and neck of the fibula. Next, using moistened padded splint material, a posterior splint was applied from the plantar foot posteriorly up the leg to approximately the midcalf area.  ACE compression was then applied from distal to proximal to secure the posterior splint in place and provide compression to prevent post-operative edema. At this time, the foot was then dorsiflexed to prevent acquired equinus deformity and relieve tension on the surgical repair. END OF PROCEDURE: The patient tolerated the procedure and anesthesia well and was transported from the operating room to the PACU with vital signs stable and vascular status intact to all aspects of the patient's left lower extremity and digital capillary refill time immediate to the digits of the left foot. Following a period of post-operative monitoring, the patient will be discharged home with written and oral wound care and follow-up instructions. The patient was provided with prescriptions for Eliquis, Percocet, and Phenergran. The patient is to follow-up with Dr. Raman Lilly DPM at the [facility] podiatry outpatient clinic this Friday on 5/20/2022. The patient is to keep dressing clean, dry and intact at all times. The patient is to remain non-weight bearing to the left lower extremity. The patient is to call if any complications occur.     This operative report was dictated on behalf of Dr. Raman Lilly DPM.    Electronically signed by Saskia Leal DPM on 5/16/2022 at 5:33 PM

## 2022-05-16 NOTE — PROGRESS NOTES
Ambulatory Surgery/Procedure Discharge Note    Vitals:    05/16/22 1614   BP: (!) 153/88   Pulse: 81   Resp: 16   Temp: 97.8 °F (36.6 °C)   SpO2: 99%       In: 800 [I.V.:800]  Out: 25     Pain assessment:  present - adequately treated  Pain Level: 3  Pt vss. States pain tolerable and denies need for any medication at this time. Tolerating PO, no n/v. States readiness to go home. Discharge instructions reviewed, IV d/c'd. Patient discharged to home/self care.  Patient discharged via wheel chair by transporter to waiting family/S.O.       5/16/2022 5:42 PM

## 2022-05-16 NOTE — ANESTHESIA PROCEDURE NOTES
Peripheral Block    Patient location during procedure: pre-op  Start time: 5/16/2022 12:09 PM  End time: 5/16/2022 12:21 PM  Staffing  Performed: anesthesiologist   Anesthesiologist: Melissa Herrera MD  Preanesthetic Checklist  Completed: patient identified, IV checked, site marked, risks and benefits discussed, surgical consent, monitors and equipment checked, pre-op evaluation, timeout performed, anesthesia consent given, oxygen available and patient being monitored  Peripheral Block  Prep: ChloraPrep  Patient monitoring: cardiac monitor, continuous pulse ox, frequent blood pressure checks and IV access  Block type: Sciatic  Laterality: left  Injection technique: single-shot  Guidance: ultrasound guided  Local infiltration: lidocaine  Infiltration strength: 1 %  Dose: 3 mL  Popliteal  Provider prep: mask and sterile gloves  Local infiltration: lidocaine  Needle  Needle gauge: 21 G  Needle length: 10 cm  Needle localization: ultrasound guidance  Assessment  Injection assessment: negative aspiration for heme, no paresthesia on injection and local visualized surrounding nerve on ultrasound  Paresthesia pain: none  Slow fractionated injection: yes  Hemodynamics: stableOutcomes: patient tolerated procedure well  Additional Notes  Immediately prior to procedure a \"time out\" was called to verify the correct patient, allergies, laterality, procedure and equipment. Time out performed with preop RN    Local Anesthetic: 0.5 %  Bupivacaine   Amount: 30 ml  in 5 ml increments after negative aspiration each time. Biceps Femoris muscle (long head), Vastus lateralis muscle, Sciatic nerve (Tibia and Common Peroneal Nerves) and Popliteal artery are identified; the tip of the needle and the spread of the local anesthetic around the Tibial and Common Peroneal Nerve are visualized. The Sciatic Nerve (Tibia and Common Peroneal Nerve) appeared to be anatomically normal and there were no abnormal pathologically findings seen.

## 2022-05-16 NOTE — H&P
Susana Brady    [id number]    [facility] Same Day Surgery Update H & P  Department of General Surgery   Surgical Service   Pre-operative History and Physical  Last H & P within the last 30 days. DIAGNOSIS:   Acute osteomyelitis of left foot ([facility]) [M86.172]  Contracture of right Achilles tendon [M67.01]    Procedure(s):  ON THE LEFT: TRANSMETATARSAL AMPUTATION;  TENDO ACHILLES LENGTHENING LEFT LOWER EXTREMITY    History obtained from: Patient interview and EHR      HISTORY OF PRESENT ILLNESS:   The patient is a 35 y.o. male with osteomyelitis of the left foot presents today for the above procedure. Illness Screening: Patient denies fever, chills, worsening cough, or close contact with sick individuals. Past Medical History:        Diagnosis Date    Depression     Diabetes mellitus ([address]     Hyperlipidemia     Hypertension     Osteoarthritis of both knees     Osteomyelitis ([address]     FOOT      Past Surgical History:        Procedure Laterality Date    FOOT SURGERY Left 02/19/2018     INCISION AND DRAINAGE WITH HALLUX AMPUTATION LEFT FOOT    KNEE SURGERY Right 06/19/2013    X2    NECK SURGERY Left 11/9/2021    LEFT FACIAL ABSCESS INCISION AND DRAINAGE performed by Joel Mina MD at [place] [address] Left     2nd toe    TOE AMPUTATION Right 9/26/2019    RIGHT HALLUX AMPUTATION WITH POSSIBLE 1ST RAY AMPUTATION performed by Charley Camacho DPM at [facility] Right 2017       Medications Prior to Admission:      Prior to Admission medications    Medication Sig Start Date End Date Taking? Authorizing Provider   buprenorphine-naloxone (SUBOXONE) 12-3 MG sublingual film Place 1 Film under the tongue daily.    Yes Historical Provider, MD   buPROPion (WELLBUTRIN XL) 150 MG extended release tablet Take 150 mg by mouth every morning   Yes Historical Provider, MD   naproxen (NAPROSYN) 500 MG tablet Take 500 mg by mouth 2 times daily (with meals) 3/25/22  Yes Historical Provider, MD   Probiotic Product (PROBIOTIC-10 PO) Take 100 mg by mouth daily   Yes Historical Provider, MD   escitalopram (LEXAPRO) 20 MG tablet Take 20 mg by mouth in the morning and at bedtime   Yes Historical Provider, MD   insulin lispro, 1 Unit Dial, 100 UNIT/ML SOPN Inject 10 Units into the skin 3 times daily (before meals) 2/17/22   Martinez Brock MD   omeprazole (PRILOSEC) 40 MG delayed release capsule Take 1 capsule (40 mg) by mouth daily 30 minutes before breakfast and dinner. 2/13/22   Historical Provider, MD   pioglitazone (ACTOS) 30 MG tablet Take 30 mg by mouth daily 5/24/21   Historical Provider, MD   insulin glargine (LANTUS;BASAGLAR) 100 UNIT/ML injection pen Inject 35 Units into the skin 2 times daily 11/13/21   Yanique Gibbons MD   Insulin Pen Needle 31G X 8 MM MISC 1 each by Does not apply route daily 11/13/21   Claire Fernandez MD   empagliflozin (JARDIANCE) 10 MG tablet Take 25 mg by mouth daily  6/30/21   Historical Provider, MD   gabapentin (NEURONTIN) 600 MG tablet Take 1,200 mg by mouth 3 times daily.   10/13/21   Historical Provider, MD   vitamin D (ERGOCALCIFEROL) 1.25 MG (91727 UT) CAPS capsule TAKE 1 CAPSULE BY MOUTH ONE TIME A WEEK 10/19/21   Historical Provider, MD   atorvastatin (LIPITOR) 40 MG tablet Take 40 mg by mouth daily    Historical Provider, MD         Allergies:  Cephalexin and Liraglutide    PHYSICAL EXAM:      BP (!) 160/96   Pulse 98   Temp 98 °F (36.7 °C) (Temporal)   Resp 16   Ht 6' 3\" (1.905 m)   Wt (!) 312 lb 12.8 oz (141.9 kg)   SpO2 98%   BMI 39.10 kg/m²      Airway:  Airway patent with no audible stridor    Heart:  Regular rate and rhythm, No murmur noted    Lungs:  No increased work of breathing, good air exchange, clear to auscultation bilaterally, no crackles or wheezing    Abdomen:  Soft, non-distended, non-tender, no rebound tenderness or guarding, and no masses palpated    ASSESSMENT AND PLAN     Patient is a 35 y.o. male with above specified procedure planned. 1.  The patients history and physical was obtained and signed off by the pre-admission testing department. Patient seen and focused exam done today- no new changes since last physical exam on 5/13/22    2. Access to ancillary services are available per request of the provider.     CAROLINE Lacey - TIEN     5/16/2022

## 2022-05-16 NOTE — TELEPHONE ENCOUNTER
Patient notified Dr. Ever Redmond will address the Home health nurse coming at his visit Friday . No one is to change dressing after surgery except the clinic doctors for now. He verbalized understanding .

## 2022-05-16 NOTE — PROGRESS NOTES
Patient admitted to PACU. Post op Left Transmetarsal amputation. Report received from anesthesia personnel Bi Franco CRNA. Patients VS stable. Pain level 5/10 left foot- peripheral block given in pre op for pain control. Left foot/calf area with ace wrap/splint in place, elevated and ice pack applied behind the knee. Crutches dispensed, and walker as ordered. Patient verablizes that non weight bearing to LLE , follow up appointment Friday. Prescriptions sent to outpatient pharmacy as requested.

## 2022-05-16 NOTE — ANESTHESIA PRE PROCEDURE
Department of Anesthesiology  Preprocedure Note       Name:  Mare Ortiz   Age:  35 y.o.  :  1980                                          MRN:  [id number]         Date:  2022      Surgeon: Karen Fernandez):  Ruth Baltazar DPM    Procedure: Procedure(s):  ON THE LEFT: TRANSMETATARSAL AMPUTATION;  TENDO ACHILLES LENGTHENING LEFT LOWER EXTREMITY    Medications prior to admission:   Prior to Admission medications    Medication Sig Start Date End Date Taking? Authorizing Provider   buprenorphine-naloxone (SUBOXONE) 12-3 MG sublingual film Place 1 Film under the tongue daily. Yes Historical Provider, MD   buPROPion (WELLBUTRIN XL) 150 MG extended release tablet Take 150 mg by mouth every morning   Yes Historical Provider, MD   naproxen (NAPROSYN) 500 MG tablet Take 500 mg by mouth 2 times daily (with meals) 3/25/22  Yes Historical Provider, MD   Probiotic Product (PROBIOTIC-10 PO) Take 100 mg by mouth daily   Yes Historical Provider, MD   escitalopram (LEXAPRO) 20 MG tablet Take 20 mg by mouth in the morning and at bedtime   Yes Historical Provider, MD   insulin lispro, 1 Unit Dial, 100 UNIT/ML SOPN Inject 10 Units into the skin 3 times daily (before meals) 22   Fide Alanis MD   omeprazole (PRILOSEC) 40 MG delayed release capsule Take 1 capsule (40 mg) by mouth daily 30 minutes before breakfast and dinner. 22   Historical Provider, MD   pioglitazone (ACTOS) 30 MG tablet Take 30 mg by mouth daily 21   Historical Provider, MD   insulin glargine (LANTUS;BASAGLAR) 100 UNIT/ML injection pen Inject 35 Units into the skin 2 times daily 21   Alisha Martinez MD   Insulin Pen Needle 31G X 8 MM MISC 1 each by Does not apply route daily 21   Madalyn Corey MD   empagliflozin (JARDIANCE) 10 MG tablet Take 25 mg by mouth daily  21   Historical Provider, MD   gabapentin (NEURONTIN) 600 MG tablet Take 1,200 mg by mouth 3 times daily.   10/13/21   Historical Provider, MD   vitamin D (ERGOCALCIFEROL) 1.25 MG (26564 UT) CAPS capsule TAKE 1 CAPSULE BY MOUTH ONE TIME A WEEK 10/19/21   Historical Provider, MD   atorvastatin (LIPITOR) 40 MG tablet Take 40 mg by mouth daily    Historical Provider, MD       Current medications:    Current Facility-Administered Medications   Medication Dose Route Frequency Provider Last Rate Last Admin    lactated ringers infusion   IntraVENous Continuous Codie Heaps, DO           Allergies: Allergies   Allergen Reactions    Cephalexin Shortness Of Breath    Liraglutide Nausea And Vomiting     Severe gastroparesis including bezoar found on EGD  ? ?        Problem List:    Patient Active Problem List   Diagnosis Code    Gangrene of toe of left foot ([facility] [address] I96    Type 2 diabetes mellitus ([facility] [address] E11.9    Essential hypertension I10    Obesity, Class III, BMI 40-49.9 (morbid obesity) ([facility] [address] E66.01    Diabetic foot infection ([facility] [address] E11.628, L08.9    Toe osteomyelitis, left ([facility] [address] M86.9    Toe necrosis ([facility] [address] I96    Diabetic polyneuropathy ([facility] [address] E11.42    Facial abscess L02.01    Neck abscess L02.11       Past Medical History:        Diagnosis Date    Depression     Diabetes mellitus ([address]     Hyperlipidemia     Hypertension     Osteoarthritis of both knees     Osteomyelitis ([address]     FOOT        Past Surgical History:        Procedure Laterality Date    FOOT SURGERY Left 02/19/2018     INCISION AND DRAINAGE WITH HALLUX AMPUTATION LEFT FOOT    KNEE SURGERY Right 06/19/2013    X2    NECK SURGERY Left 11/9/2021    LEFT FACIAL ABSCESS INCISION AND DRAINAGE performed by Tory Braxton MD at [place] [address] Left     2nd toe    TOE AMPUTATION Right 9/26/2019    RIGHT HALLUX AMPUTATION WITH POSSIBLE 1ST RAY AMPUTATION performed by Mely Rader DPM at [facility] Right 2017       Social History:    Social History     Tobacco Use    Smoking status: Never Smoker    Smokeless tobacco: Never Used   Substance Use Topics    Alcohol use: No                                Counseling given: Not Answered      Vital Signs (Current):   Vitals:    05/09/22 1509 05/16/22 1120   BP:  (!) 160/96   Pulse:  98   Resp:  16   Temp:  98 °F (36.7 °C)   TempSrc:  Temporal   SpO2:  98%   Weight: (!) 303 lb (137.4 kg) (!) 312 lb 12.8 oz (141.9 kg)   Height: 6' 3\" (1.905 m) 6' 3\" (1.905 m)                                              BP Readings from Last 3 Encounters:   05/16/22 (!) 160/96   04/14/22 (!) 151/101   02/17/22 (!) 164/95       NPO Status:                                                                                 BMI:   Wt Readings from Last 3 Encounters:   05/16/22 (!) 312 lb 12.8 oz (141.9 kg)   04/14/22 (!) 309 lb (140.2 kg)   02/14/22 (!) 315 lb (142.9 kg)     Body mass index is 39.1 kg/m². CBC:   Lab Results   Component Value Date    WBC 12.2 04/14/2022    RBC 5.21 04/14/2022    HGB 12.7 04/14/2022    HCT 38.5 04/14/2022    MCV 73.9 04/14/2022    RDW 16.4 04/14/2022     04/14/2022       CMP:   Lab Results   Component Value Date     04/14/2022    K 4.5 04/14/2022     04/14/2022    CO2 22 04/14/2022    BUN 24 04/14/2022    CREATININE 0.8 04/14/2022    GFRAA >60 04/14/2022    AGRATIO 0.9 11/08/2021    LABGLOM >60 04/14/2022    GLUCOSE 225 04/14/2022    PROT 7.9 11/08/2021    CALCIUM 10.0 04/14/2022    BILITOT 0.3 11/08/2021    ALKPHOS 96 11/08/2021    AST 8 11/08/2021    ALT 10 11/08/2021       POC Tests: No results for input(s): POCGLU, POCNA, POCK, POCCL, POCBUN, POCHEMO, POCHCT in the last 72 hours.     Coags:   Lab Results   Component Value Date    PROTIME 12.4 11/09/2021    INR 1.09 11/09/2021       HCG (If Applicable): No results found for: PREGTESTUR, PREGSERUM, HCG, HCGQUANT     ABGs: No results found for: PHART, PO2ART, SRY0LUG, DCN4RJC, BEART, G0YLDFUP     Type & Screen (If Applicable):  No results found for: LABABO, LABRH    Drug/Infectious Status (If Applicable):  No results found for: HIV, HEPCAB    COVID-19 Screening (If Applicable): No results found for: COVID19        Anesthesia Evaluation  Patient summary reviewed and Nursing notes reviewed no history of anesthetic complications:   Airway: Mallampati: II  TM distance: >3 FB   Neck ROM: full  Mouth opening: > = 3 FB Dental:    (+) poor dentition      Pulmonary:Negative Pulmonary ROS and normal exam                               Cardiovascular:    (+) hypertension:,                   Neuro/Psych:   (+) neuromuscular disease:, psychiatric history:            GI/Hepatic/Renal:   (+) morbid obesity          Endo/Other:    (+) DiabetesType II DM, , .                 Abdominal:   (+) obese,           Vascular: negative vascular ROS. Other Findings:             Anesthesia Plan      MAC and regional     ASA 3           MIPS: Postoperative opioids intended and Prophylactic antiemetics administered. Anesthetic plan and risks discussed with patient. Plan discussed with CRNA.     Attending anesthesiologist reviewed and agrees with Preprocedure content              Lashon Melendez MD   5/16/2022

## 2022-05-16 NOTE — PROGRESS NOTES
Left foot xray completed as ordered. Post op blood sugar 161. Oxycodone 5 mg PO given for pain control.

## 2022-05-20 ENCOUNTER — OFFICE VISIT (OUTPATIENT)
Dept: INTERNAL MEDICINE CLINIC | Age: 42
End: 2022-05-20
Payer: COMMERCIAL

## 2022-05-20 VITALS — TEMPERATURE: 96.8 F

## 2022-05-20 DIAGNOSIS — L89.899 PRESSURE ULCER OF RIGHT LOWER EXTREMITY: ICD-10-CM

## 2022-05-20 DIAGNOSIS — E11.610 TYPE 2 DIABETES MELLITUS WITH DIABETIC NEUROPATHIC ARTHROPATHY, UNSPECIFIED WHETHER LONG TERM INSULIN USE (HCC): ICD-10-CM

## 2022-05-20 DIAGNOSIS — Z89.432 S/P TRANSMETATARSAL AMPUTATION OF FOOT, LEFT (HCC): Primary | ICD-10-CM

## 2022-05-20 PROCEDURE — 99213 OFFICE O/P EST LOW 20 MIN: CPT

## 2022-05-20 NOTE — PATIENT INSTRUCTIONS
Home health orders sent cira tu with ras . Trying to find a Home health agency to take him   Return in 1 weeks. Keep dressing on clean  and dry and cone next week if we can't get home health . If we get home health they can do dressing changes and we will see you in 2 weeks.

## 2022-05-22 NOTE — PROGRESS NOTES
Department of Podiatry  Resident Progress Note    Selma Marley  Allergies: Cephalexin and Liraglutide    SUBJECTIVE  The patient is a 35 y.o. male who presents to clinic for postop visit #1 status post left transmetatarsal amputation and tendo Achilles lengthening (DOS 5/16/2022). Patient states that he is left the dressing clean, dry, and intact to the left lower extremity. He states that he has been compliant with his nonweightbearing to his left lower extremity and has been using wheelchair to move around. He denies any drainage or noticed any drainage to the outside layers of the dressing. He does state that he threw away his Percocet prescription as that he did not need it. He states that he has been having trouble getting home health care to come and change dressings to his right lower extremity. He denies any other pedal complaints at this time. Denies any other constitutional symptoms at this time. Past Medical History:        Diagnosis Date    Depression     Diabetes mellitus ([facility] [address]     Hyperlipidemia     Hypertension     Osteoarthritis of both knees     Osteomyelitis ([facility] [address]     FOOT        ROS: A 10 point review of systems was conducted, significant findings as noted in HPI. All other systems negative. OBJECTIVE  Patient presents unaccompanied with assistance of wheelchair with posterior splint intact to left lower extremity and normal shoe gear to the right lower extremity. Patient is in no acute distress. Patient is A&O x3. VASCULAR:   DP and PT pulses are nonpalpable 0/4 bilateral lower extremity. Upon hand-held Doppler examination biphasic signal noted to DP and PT pulses. CFT is brisk to distal TMA stump on the right and the remaining digits on the left foot. Skin temperature is warm to cool from proximal to distal with no focal calor noted. No edema noted. No pain with calf compression b/l.     NEUROLOGIC: Gross and epicritic sensation is diminished b/l.  Protective sensation is diminished at all pedal sites b/l.     DERMATOLOGIC:   Right lower extremity:    Full-thickness ulceration of the lateral aspect of the TMA site. Wound measures approximately 1.9 cm x 1.4 cm x 0.1 cm. Wound base is a mixture of fibrotic and granular tissue. The wound does not probe to bone, tunnel, or tract. No fluctuance, crepitus, malodor, or drainage noted. No acute signs of infection noted.     Full-thickness ulceration noted to the lateral aspect of the TMA site just proximal to the above-noted wound. Wound measures approximately 1.4 cm x 1.0 cm x 0.1 cm. Wound base is a mixture of fibrotic and granular tissue. Wound does not probe to bone, tunnel, or track. No fluctuance, crepitus, malodor, or drainage noted. No acute signs of infection noted. Left lower extremity:                      Serous fluid bulla noted to the dorsal aspects of the left foot. Serous pleural lysed with its drainage noted. Granular tissue noted beneath bullae. Wounds do not probe to bone. No fluctuance, crepitus, or malodor noted. No acute signs of infection noted. 3 stab incisions noted to the posterior aspect of the left lower extremity at the Achilles tendon. Skin edges remain well coapted with sutures intact. No acute signs of infection noted. No drainage expressed. No signs of dehiscence noted. Surgical incision noted to the distal aspect of the left lower extremity. Skin edges remain well coapted with sutures intact. No acute signs of infection noted. No drainage expressed. No signs of dehiscence noted. MUSCULOSKELETAL: Muscle strength is 5/5 for all pedal groups tested. No pain with palpation of the foot or ankle b/l. Ankle joint ROM is decreased in dorsiflexion with the knee extended. History of TMA bilateral lower extremity.       IMAGING  Left Foot (5/16/2022)  Narrative   Left foot       HISTORY: Postop study           Impression       3 views demonstrate postoperative changes of transmetatarsal application. Mild arthritic changes in the ankle. ASSESSMENT  -s/p Left TMA and ROSHAN (DOS 5/16/2022)  -Full-thickness ulcerations, right lower extremitySalamanca stage II  -Partial-thickness ulceration, dorsal left foot  -Diabetes mellitus type 2 with peripheral neuropathy  -History of amputations; bilateral lower extremity    PLAN  - Evaluation and management x 15 minutes and greater than 50% of the time spent explaining the etiology and treatment with the patient.   -Utilizing a sterile #15 blade bulla noted to the dorsal aspect of the left foot were lysed and drained. Healthy granular tissue noted underlying bulla. No blood loss noted. Patient tolerated well.  -Left lower extremity was dressed with Betadine, Adaptic, DSD, and Ace with gentle compression  -Cam boot was dispensed to patient for left lower extremity  -Patient instructed to take prescriptions given the day of surgery as instructed. Patient voiced understanding.  -Patient instructed to remain nonweightbearing to left lower extremity except for transfers.    -Patient's right lower extremity dressed with Betadine, DSD, and Ace with gentle compression  -Instructed patient to keep dressing to left lower extremity clean, dry, and intact  -Instructed patient to change right lower extremity dressing as listed above  -Home health care orders given to patient  -Patient to return to clinic in 1 week for postop visit #2 and wound recheck to the right lower extremity    Assessment and plan was discussed with Dr. Samreen Salazar, BETTIE.P.M.     Kevin Rai DPM   Podiatric Resident PGY1  5/22/2022, 9:51 AM

## 2022-05-25 ENCOUNTER — TELEPHONE (OUTPATIENT)
Dept: INTERNAL MEDICINE CLINIC | Age: 42
End: 2022-05-25

## 2022-05-25 NOTE — TELEPHONE ENCOUNTER
[facility] will not take patient and his insurance [facility]. PH: [phone]  Will try [facility]/ they are not under contract with [facility] at this time. Timothy Tapia, Home Health takes [facility]. spoke with [place]. [phone].

## 2022-05-27 ENCOUNTER — OFFICE VISIT (OUTPATIENT)
Dept: INTERNAL MEDICINE CLINIC | Age: 42
End: 2022-05-27
Payer: COMMERCIAL

## 2022-05-27 VITALS — TEMPERATURE: 97.9 F

## 2022-05-27 DIAGNOSIS — Z89.432 S/P TRANSMETATARSAL AMPUTATION OF FOOT, LEFT (HCC): Primary | ICD-10-CM

## 2022-05-27 DIAGNOSIS — E11.610 TYPE 2 DIABETES MELLITUS WITH DIABETIC NEUROPATHIC ARTHROPATHY, UNSPECIFIED WHETHER LONG TERM INSULIN USE (HCC): ICD-10-CM

## 2022-05-27 DIAGNOSIS — L89.899 PRESSURE ULCER OF RIGHT LOWER EXTREMITY: ICD-10-CM

## 2022-05-27 PROCEDURE — 99213 OFFICE O/P EST LOW 20 MIN: CPT

## 2022-05-27 NOTE — PATIENT INSTRUCTIONS
Keep left foot dressings clean,dry and intact.   Do not change left foot dressings    Continue right foot dressings as previously doing    Return in 1 week

## 2022-05-31 NOTE — PROGRESS NOTES
Department of Podiatry  Resident Progress Note    Bridgette Hennessy  Allergies: Cephalexin and Liraglutide    SUBJECTIVE  The patient is a 35 y.o. male who presents to clinic for postop visit #2 status post left transmetatarsal amputation and tendo Achilles lengthening (DOS 5/16/2022). Patient states that he is left the dressing clean, dry, and intact to the left lower extremity. Patient states that he is only taken out the outer layer of his dressing due to it becoming too tight. But he states that after relief he will reapply the Ace bandage. He states that he has been compliant with his nonweightbearing status to his left lower extremity and mainly been using wheelchair to move about. He denies any drainage or has not noticed any drainage to the outside layers of his dressing. He states that he still having trouble getting home health care and has been changing his dressing to his right lower extremity wounds on a daily basis. But also states that he is running out of supplies. He denies any other pedal complaints at this time. He denies any nausea vomiting, fever, chills, shortness of breath, calf pain. Past Medical History:        Diagnosis Date    Depression     Diabetes mellitus ([address]     Hyperlipidemia     Hypertension     Osteoarthritis of both knees     Osteomyelitis ([address]     FOOT        ROS: A 10 point review of systems was conducted, significant findings as noted in HPI. All other systems negative. OBJECTIVE  Patient presents unaccompanied with assistance of wheelchair with posterior splint intact to left lower extremity and normal shoe gear to the right lower extremity. Patient is in no acute distress. Patient is A&O x3. VASCULAR:   DP and PT pulses are nonpalpable 0/4 bilateral lower extremity. Upon hand-held Doppler examination biphasic signal noted to DP and PT pulses. CFT is brisk to distal TMA stump on the right and the remaining digits on the left foot.   Skin temperature is warm to cool from proximal to distal with no focal calor noted. No edema noted. No pain with calf compression b/l.     NEUROLOGIC: Gross and epicritic sensation is diminished b/l. Protective sensation is diminished at all pedal sites b/l.     DERMATOLOGIC:   Right lower extremity:      Full-thickness ulceration of the lateral aspect of the TMA site. Wound measures approximately 1.9 cm x 1.4 cm x 0.1 cm. Wound base is a mixture of fibrotic and granular tissue. The wound does not probe to bone, tunnel, or tract. No fluctuance, crepitus, malodor, or drainage noted. No acute signs of infection noted.     Full-thickness ulceration noted to the lateral aspect of the TMA site just proximal to the above-noted wound. Wound measures approximately 1.4 cm x 1.0 cm x 0.1 cm. Wound base is a mixture of fibrotic and granular tissue. Wound does not probe to bone, tunnel, or track. No fluctuance, crepitus, malodor, or drainage noted. No acute signs of infection noted. Left lower extremity:                    Partial-thickness ulceration noted to the dorsal aspect of the left lower extremity secondary to bulla formation. Wound base is granular in nature. Wounds do not probe to bone. No fluctuance, crepitus, or malodor noted. No acute signs of infection noted. 3 stab incisions noted to the posterior aspect of the left lower extremity at the Achilles tendon. Skin edges remain well coapted. No acute signs of infection noted. No drainage expressed. No signs of dehiscence noted. Surgical incision noted to the distal aspect of the left lower extremity. Skin edges remain well coapted with sutures intact. No acute signs of infection noted. No drainage expressed. No signs of dehiscence noted. MUSCULOSKELETAL: Muscle strength is 5/5 for all pedal groups tested. No pain with palpation of the foot or ankle b/l. Ankle joint ROM is decreased in dorsiflexion with the knee extended.   History of TMA bilateral lower extremity. IMAGING  Left Foot (5/16/2022)  Narrative   Left foot       HISTORY: Postop study           Impression       3 views demonstrate postoperative changes of transmetatarsal application. Mild arthritic changes in the ankle. ASSESSMENT  -s/p Left TMA and ROSHAN (DOS 5/16/2022)  -Full-thickness ulcerations, right lower extremity-Salamanca stage II  -Partial-thickness ulceration, dorsal left foot  -Diabetes mellitus type 2 with peripheral neuropathy  -History of amputations; bilateral lower extremity    PLAN  - Evaluation and management x 15 minutes and greater than 50% of the time spent explaining the etiology and treatment with the patient.   -Utilizing a sterile #15 blade and sterile pickups the 3 sutures noted to the posterior aspect of the left lower extremity at the level of the Achilles tendon were removed in total.  Patient tolerated well.  -Left lower extremity was dressed with Betadine, Adaptic, DSD, and Ace with gentle compression  -Patient instructed to remain nonweightbearing to left lower extremity except for transfers.    -Patient's right lower extremity dressed with Betadine, DSD, and Ace with gentle compression  -Instructed patient to keep dressing to left lower extremity clean, dry, and intact  -Instructed patient to change right lower extremity dressing as listed above  -Patient to return to clinic in 1 week for postop visit #3 and wound recheck to the right lower extremity and possible suture removal.    Assessment and plan was discussed with HERBERT LewisP.M.     Lars Whipple DPM   Podiatric Resident PGY1  5/30/2022, 8:36 PM

## 2022-06-03 ENCOUNTER — OFFICE VISIT (OUTPATIENT)
Dept: INTERNAL MEDICINE CLINIC | Age: 42
End: 2022-06-03
Payer: COMMERCIAL

## 2022-06-03 VITALS — TEMPERATURE: 97 F

## 2022-06-03 DIAGNOSIS — E08.621 DIABETIC ULCER OF RIGHT MIDFOOT ASSOCIATED WITH DIABETES MELLITUS DUE TO UNDERLYING CONDITION, LIMITED TO BREAKDOWN OF SKIN (HCC): ICD-10-CM

## 2022-06-03 DIAGNOSIS — L97.411 DIABETIC ULCER OF RIGHT MIDFOOT ASSOCIATED WITH DIABETES MELLITUS DUE TO UNDERLYING CONDITION, LIMITED TO BREAKDOWN OF SKIN (HCC): ICD-10-CM

## 2022-06-03 DIAGNOSIS — Z89.432 S/P TRANSMETATARSAL AMPUTATION OF FOOT, LEFT (HCC): Primary | ICD-10-CM

## 2022-06-03 DIAGNOSIS — E11.610 TYPE 2 DIABETES MELLITUS WITH DIABETIC NEUROPATHIC ARTHROPATHY, UNSPECIFIED WHETHER LONG TERM INSULIN USE (HCC): ICD-10-CM

## 2022-06-03 PROCEDURE — 99213 OFFICE O/P EST LOW 20 MIN: CPT | Performed by: STUDENT IN AN ORGANIZED HEALTH CARE EDUCATION/TRAINING PROGRAM

## 2022-06-04 NOTE — PROGRESS NOTES
Department of Podiatry  Resident Progress Note    Rj Hughes  Allergies: Cephalexin and Liraglutide    SUBJECTIVE  The patient is a 35 y.o. male who presents to clinic for postop visit #3 status post left transmetatarsal amputation and tendo Achilles lengthening (DOS 5/16/2022). Patient states that he is left the dressing clean, dry, and intact to the left lower extremity but has been changing his right lower extremity dressing every other day. Patient is having trouble getting home health care and is running out of supplies. He denies any other pedal complaints at this time. He denies any nausea vomiting, fever, chills, shortness of breath, calf pain. Past Medical History:        Diagnosis Date    Depression     Diabetes mellitus ([facility] [address]     Hyperlipidemia     Hypertension     Osteoarthritis of both knees     Osteomyelitis ([facility] [address]     FOOT        ROS: A 10 point review of systems was conducted, significant findings as noted in HPI. All other systems negative. OBJECTIVE  Patient presents unaccompanied with assistance of wheelchair with posterior splint intact to left lower extremity and normal shoe gear to the right lower extremity. Patient is in no acute distress. Patient is A&O x3. VASCULAR:   DP and PT pulses are nonpalpable 0/4 bilateral lower extremity. Upon hand-held Doppler examination biphasic signal noted to DP and PT pulses. CFT is brisk to distal TMA stump on the right and the remaining digits on the left foot. Skin temperature is warm to cool from proximal to distal with no focal calor noted. No edema noted. No pain with calf compression b/l.     NEUROLOGIC: Gross and epicritic sensation is diminished b/l. Protective sensation is diminished at all pedal sites b/l.     DERMATOLOGIC:   Right lower extremity:    Full-thickness ulceration of the lateral aspect of the TMA site. Wound measures approximately 1.9 cm x 1.4 cm x 0.1 cm.   Wound base is a mixture of fibrotic and granular tissue. The wound does not probe to bone, tunnel, or tract. No fluctuance, crepitus, malodor, or drainage noted. No acute signs of infection noted.     Full-thickness ulceration noted to the lateral aspect of the TMA site just proximal to the above-noted wound. Wound measures approximately 1.4 cm x 1.0 cm x 0.1 cm. Wound base is a mixture of fibrotic and granular tissue. Wound does not probe to bone, tunnel, or track. No fluctuance, crepitus, malodor, or drainage noted. No acute signs of infection noted. Left lower extremity  Surgical incision noted to the posterior aspect of the left lower extremity of the Achilles region appears intact with incision appear well healed. No acute signs of infection noted. No drainage expressed. No signs of dehiscence noted. Surgical incision noted to the distal aspect of the left lower extremity. Skin edges remain well coapted with sutures intact. No acute signs of infection noted. No drainage expressed. No signs of dehiscence noted. MUSCULOSKELETAL: Muscle strength is 5/5 for all pedal groups tested. No pain with palpation of the foot or ankle b/l. Ankle joint ROM is decreased in dorsiflexion with the knee extended. History of TMA bilateral lower extremity. IMAGING  Left Foot (5/16/2022)  Narrative   Left foot       HISTORY: Postop study           Impression       3 views demonstrate postoperative changes of transmetatarsal application. Mild arthritic changes in the ankle.          ASSESSMENT  -s/p Left TMA and ROSHAN (DOS 5/16/2022)  -Full-thickness ulcerations, right lower extremity-Salamanca stage II  -Partial-thickness ulceration, dorsal left foot  -Diabetes mellitus type 2 with peripheral neuropathy  -History of amputations; bilateral lower extremity    PLAN  - Evaluation and management x 15 minutes and greater than 50% of the time spent explaining the etiology and treatment with the patient.   -Utilizing a sterile #15 blade

## 2022-06-13 ENCOUNTER — OFFICE VISIT (OUTPATIENT)
Dept: INTERNAL MEDICINE CLINIC | Age: 42
End: 2022-06-13
Payer: COMMERCIAL

## 2022-06-13 VITALS — TEMPERATURE: 98.1 F

## 2022-06-13 DIAGNOSIS — L89.899 PRESSURE ULCER OF RIGHT LOWER EXTREMITY: ICD-10-CM

## 2022-06-13 DIAGNOSIS — L97.411 DIABETIC ULCER OF RIGHT MIDFOOT ASSOCIATED WITH DIABETES MELLITUS DUE TO UNDERLYING CONDITION, LIMITED TO BREAKDOWN OF SKIN (HCC): Primary | ICD-10-CM

## 2022-06-13 DIAGNOSIS — Z89.432 S/P TRANSMETATARSAL AMPUTATION OF FOOT, LEFT (HCC): ICD-10-CM

## 2022-06-13 DIAGNOSIS — E11.610 TYPE 2 DIABETES MELLITUS WITH DIABETIC NEUROPATHIC ARTHROPATHY, UNSPECIFIED WHETHER LONG TERM INSULIN USE (HCC): ICD-10-CM

## 2022-06-13 DIAGNOSIS — E08.621 DIABETIC ULCER OF RIGHT MIDFOOT ASSOCIATED WITH DIABETES MELLITUS DUE TO UNDERLYING CONDITION, LIMITED TO BREAKDOWN OF SKIN (HCC): Primary | ICD-10-CM

## 2022-06-13 PROCEDURE — 99213 OFFICE O/P EST LOW 20 MIN: CPT

## 2022-06-13 NOTE — PATIENT INSTRUCTIONS
Left foot apply betadine . Change only once before next vist . Do it in 3 days   Right foot wet to dry dressing every OTHER day. Return n 1 week   Work on getting dressing supplies.

## 2022-06-13 NOTE — PROGRESS NOTES
Department of Podiatry  Resident Progress Note    Katharina Mckinley  Allergies: Cephalexin and Liraglutide    SUBJECTIVE  The patient is a 35 y.o. male who presents to clinic for postop visit #4 status post left transmetatarsal amputation and tendo Achilles lengthening (DOS 5/16/2022). Patient states that he was unable to make his last Friday appointment due to have issues with a ride. Patient states that he changed his dressings to his right foot every other day and states there is a new wound on the bottom of the right foot. He states that the new wound came over this past weekend. He states that he noticed it when he was changing his dressing on Saturday. He states that he does not walk with his post op shoe to his right foot and usually walks without any protective shoe gear or post op show whenever he is at home. He states that he does put on his post op shoe to his right foot when he goes out. Patient states that he changed his dressing to his left lower extremity over the weekend because he missed his appointment last Friday. Patient states that he is having obtaining home health care and states that he is running out of supplies to change his dressing for his right lower extremity. Patient denies any other pedal complaints. He denies any nausea vomiting, fever, chills, shortness of breath, calf pain. Past Medical History:        Diagnosis Date    Depression     Diabetes mellitus ([address]     Hyperlipidemia     Hypertension     Osteoarthritis of both knees     [facility]omyel[facility] ([facility] [address] [facility]     FOOT        ROS: A 10 point review of systems was conducted, significant findings as noted in HPI. All other systems negative. OBJECTIVE  Patient presents unaccompanied with assistance of wheelchair with posterior splint intact to left lower extremity and normal shoe gear to the right lower extremity. Patient is in no acute distress. Patient is A&O x3.     VASCULAR:   DP and PT pulses are nonpalpable 0/4 bilateral lower extremity. Upon hand-held Doppler examination biphasic signal noted to DP and PT pulses. CFT is brisk to distal TMA stump on the right and the remaining digits on the left foot. Skin temperature is warm to cool from proximal to distal with no focal calor noted. No edema noted. No pain with calf compression b/l.     NEUROLOGIC: Gross and epicritic sensation is diminished b/l. Protective sensation is diminished at all pedal sites b/l.     DERMATOLOGIC:   Right lower extremity:      Full-thickness ulceration of the lateral aspect of the TMA site. Wound measures approximately 1.9 cm x 1.4 cm x 0.1 cm. Wound base is a mixture of fibrotic and granular tissue. The wound does not probe to bone, tunnel, or tract. No fluctuance, crepitus, malodor, or drainage noted. No acute signs of infection noted.     Full-thickness ulceration noted to the lateral aspect of the TMA site just proximal to the above-noted wound. Wound measures approximately 1.4 cm x 1.0 cm x 0.1 cm. Wound base is a mixture of fibrotic and granular tissue. Wound does not probe to bone, tunnel, or track. No fluctuance, crepitus, malodor, or drainage noted. No acute signs of infection noted. Full-thickness ulceration noted to the plantar aspect of the right foot. The wound measures approximately 1.3 cm x 1 cm x 0 point. The wound base is granular in nature with a hyperkeratotic periwound. Wound does not probe to bone, tunnel, or track. No fluctuance, crepitus, malodor, or drainage noted. No acute signs of infection noted. Left lower extremity:                  Surgical incision noted to the posterior aspect of the left lower extremity of the Achilles region incision remains well coapted. No acute signs of infection noted. No drainage expressed. No signs of dehiscence noted. Surgical incision noted to the distal aspect of the left lower extremity. Skin edges remain well coapted. No acute signs of infection noted.   No drainage expressed. No signs of dehiscence noted. MUSCULOSKELETAL: Muscle strength is 5/5 for all pedal groups tested. No pain with palpation of the foot or ankle b/l. Ankle joint ROM is decreased in dorsiflexion with the knee extended. History of TMA bilateral lower extremity. IMAGING  Left Foot (5/16/2022)  Narrative   Left foot       HISTORY: Postop study           Impression       3 views demonstrate postoperative changes of transmetatarsal application. Mild arthritic changes in the ankle. ASSESSMENT  -s/p Left TMA and ROSHAN (DOS 5/16/2022)  -Full-thickness ulcerations, right lower extremity-Salamanca stage II  -Partial-thickness ulceration,dorsal left foot  -Diabetes mellitus type 2 with peripheral neuropathy  -History of amputations; bilateral lower extremity    PLAN  - Evaluation and management x 15 minutes and greater than 50% of the time spent explaining the etiology and treatment with the patient.   -Utilizing a sterile #15 blade and sterile pick ups the remaining sutures to patients left TMA incision site was removed.   -Left lower extremity was dressed with Betadine, Adaptic, DSD, and Ace with gentle compression  -Patient instructed to remain nonweightbearing to left lower extremity except for transfers in CAM boot. -Patient's right lower extremity dressed with wet to dry gauze, DSD, and Ace with gentle compression  -Instructed patient to keep dressing to left lower extremity clean, dry, and intact and change once before next visit as listed above  -Instructed patient to change right lower extremity dressing as listed above every other day.    -Rx given to patient for Dressing supplies including ACE, Rolled Gauze, and Gauze.   -Patient to return to clinic in 1 week for postop visit #5 and wound recheck to the right lower extremity    Patients assessment and plan was discussed with YOHANNES Fiore DPM   Podiatric Resident PGY1  Pager [phone] or Shruthi  6/13/2022, 12:57 PM

## 2022-06-28 ENCOUNTER — TELEPHONE (OUTPATIENT)
Dept: INTERNAL MEDICINE CLINIC | Age: 42
End: 2022-06-28

## 2022-07-15 ENCOUNTER — TELEPHONE (OUTPATIENT)
Dept: INTERNAL MEDICINE CLINIC | Age: 42
End: 2022-07-15

## 2022-07-15 NOTE — TELEPHONE ENCOUNTER
Dr. Carrie Dias aware unable to get patient to come to clinic . As we have not seen him in 1 month  we can not order dressings until  l we see the surgical site and know what he needs. Message left on voice mail and in My Chart again yesterday.

## 2022-08-26 ENCOUNTER — TELEPHONE (OUTPATIENT)
Dept: INTERNAL MEDICINE CLINIC | Age: 42
End: 2022-08-26

## 2024-02-19 RX ORDER — LANOLIN ALCOHOL/MO/W.PET/CERES
1000 CREAM (GRAM) TOPICAL DAILY
COMMUNITY

## 2024-02-19 RX ORDER — DULAGLUTIDE 0.75 MG/.5ML
0.75 INJECTION, SOLUTION SUBCUTANEOUS WEEKLY
COMMUNITY

## 2024-02-19 RX ORDER — MULTIVIT-MIN/IRON/FOLIC ACID/K 18-600-40
2000 CAPSULE ORAL DAILY
COMMUNITY

## 2024-02-19 RX ORDER — FERROUS SULFATE 325(65) MG
325 TABLET ORAL
COMMUNITY

## 2024-02-19 RX ORDER — VENLAFAXINE HYDROCHLORIDE 75 MG/1
75 CAPSULE, EXTENDED RELEASE ORAL DAILY
COMMUNITY

## 2024-02-19 RX ORDER — RIVAROXABAN 10 MG/1
10 TABLET, FILM COATED ORAL EVERY 24 HOURS
COMMUNITY

## 2024-02-19 NOTE — PROGRESS NOTES
Patient sates he is to have a stress test on 2/20/24 due to an abnormal preop EKG. Will contact Dr. Byrd's office for H&P, EKG and labs. Patient states he will see his addiction doctor on Friday 2/23/24 and will most likely be stopping the suboxone on 2/23/24 and at the latest 2/24/24.

## 2024-02-19 NOTE — PROGRESS NOTES
Name_______________________________________Printed:____________________  Date and time of surgery__2/26/24  1000______________________Arrival Time:__0830  [facility]______________   1. The instructions given regarding when and if a patient needs to stop oral intake prior to surgery varies.Follow the specific instructions you were given                  __x_Nothing to eat or to drink after Midnight the night before.                   ____Carbo loading or instructions will be given to select patients-if you have been given those instructions -please do the following                           The evening before your surgery after dinner before midnight drink 40 ounces of gatorade.If you are diabetic use sugar free.  The morning of surgery drink 40 ounces of water.This needs to be finished 3 hours prior to your surgery start time.    2. Take the following pills with a small sip of water on the morning of surgery__effexor and omeprazole_________________________________________________                  Do not take blood pressure medications ending in pril or sartan the rama prior to surgery or the morning of surgery. Dr Crespo's patient are not to take any medications the AM of surgery.         3. Aspirin, Ibuprofen, Advil, Naproxen, Vitamin E and other Anti-inflammatory products and supplements should be stopped for 5 -7days before surgery or as directed by your physician.   4. Check with your Doctor regarding stopping Plavix, Coumadin,Eliquis, Lovenox,Effient,Pradaxa,Xarelto, Fragmin or other blood thinners and follow their instructions.   5. Do not smoke, and do not drink any alcoholic beverages 24 hours prior to surgery.  This includes NA Beer.Refrain from the usage of any recreational drugs.   6. You may brush your teeth and gargle the morning of surgery.  DO NOT SWALLOW WATER   7. You MUST make arrangements for a responsible adult to stay on site while you are here and take you home after your surgery. You will not be

## 2024-02-21 ENCOUNTER — ANESTHESIA EVENT (OUTPATIENT)
Dept: OPERATING ROOM | Age: 44
End: 2024-02-21
Payer: COMMERCIAL

## 2024-02-21 NOTE — PROGRESS NOTES
Call received from patient requesting to have [facility] anesthesia department review his stress test to see if they will allow him to proceed with surgery. This request being made from his PCP Dr. Byrd. Called and spoke with Dr. Bocanegra. He reviewed the stress test results and stated that patient may proceed with the procedure with the moderate cardiac risk. Called and spoke with Akash at Dr. Collins's office and informed him of the above. Faxed the stress test results and the 2/15/24 office note from Dr. Byrd to Dr. Collins. Faxed request for the EKG tracing from Dr. Byrd's office from 2/15/24. Called and informed patient of what Dr. Bocanegra had stated.

## 2024-02-22 NOTE — PROGRESS NOTES
Spoke with patient. He went to see his addiction physician today and he is to stop taking his suboxone on Saturday 2/24/23. Patient took last dose of trulicity on 2/18/24 and will not take his weekly dose this Sunds 2/25/24.

## 2024-02-26 ENCOUNTER — HOSPITAL ENCOUNTER (OUTPATIENT)
Age: 44
Setting detail: OUTPATIENT SURGERY
Discharge: HOME | End: 2024-02-26
Attending: PODIATRIST | Admitting: PODIATRIST
Payer: COMMERCIAL

## 2024-02-26 ENCOUNTER — ANESTHESIA (OUTPATIENT)
Dept: OPERATING ROOM | Age: 44
End: 2024-02-26
Payer: COMMERCIAL

## 2024-02-26 ENCOUNTER — APPOINTMENT (OUTPATIENT)
Dept: GENERAL RADIOLOGY | Age: 44
End: 2024-02-26
Attending: PODIATRIST
Payer: COMMERCIAL

## 2024-02-26 VITALS
BODY MASS INDEX: 33.69 KG/M2 | DIASTOLIC BLOOD PRESSURE: 84 MMHG | SYSTOLIC BLOOD PRESSURE: 147 MMHG | TEMPERATURE: 98 F | RESPIRATION RATE: 23 BRPM | WEIGHT: 271 LBS | HEART RATE: 95 BPM | OXYGEN SATURATION: 100 % | HEIGHT: 75 IN

## 2024-02-26 DIAGNOSIS — G89.18 POST-OP PAIN: Primary | ICD-10-CM

## 2024-02-26 LAB
GLUCOSE BLD-MCNC: 248 MG/DL (ref 70–99)
GLUCOSE BLD-MCNC: 370 MG/DL (ref 70–99)
PERFORMED ON: ABNORMAL
PERFORMED ON: ABNORMAL

## 2024-02-26 PROCEDURE — 7100000000 HC PACU RECOVERY - FIRST 15 MIN: Performed by: PODIATRIST

## 2024-02-26 PROCEDURE — 7100000011 HC PHASE II RECOVERY - ADDTL 15 MIN: Performed by: PODIATRIST

## 2024-02-26 PROCEDURE — A4217 STERILE WATER/SALINE, 500 ML: HCPCS | Performed by: PODIATRIST

## 2024-02-26 PROCEDURE — 2709999900 HC NON-CHARGEABLE SUPPLY: Performed by: PODIATRIST

## 2024-02-26 PROCEDURE — C1713 ANCHOR/SCREW BN/BN,TIS/BN: HCPCS | Performed by: PODIATRIST

## 2024-02-26 PROCEDURE — 3700000001 HC ADD 15 MINUTES (ANESTHESIA): Performed by: PODIATRIST

## 2024-02-26 PROCEDURE — 64447 NJX AA&/STRD FEMORAL NRV IMG: CPT | Performed by: ANESTHESIOLOGY

## 2024-02-26 PROCEDURE — 64445 NJX AA&/STRD SCIATIC NRV IMG: CPT | Performed by: ANESTHESIOLOGY

## 2024-02-26 PROCEDURE — 7100000001 HC PACU RECOVERY - ADDTL 15 MIN: Performed by: PODIATRIST

## 2024-02-26 PROCEDURE — 3700000000 HC ANESTHESIA ATTENDED CARE: Performed by: PODIATRIST

## 2024-02-26 PROCEDURE — 2580000003 HC RX 258: Performed by: PODIATRIST

## 2024-02-26 PROCEDURE — 6360000002 HC RX W HCPCS: Performed by: PODIATRIST

## 2024-02-26 PROCEDURE — C1762 CONN TISS, HUMAN(INC FASCIA): HCPCS | Performed by: PODIATRIST

## 2024-02-26 PROCEDURE — 73630 X-RAY EXAM OF FOOT: CPT

## 2024-02-26 PROCEDURE — 7100000010 HC PHASE II RECOVERY - FIRST 15 MIN: Performed by: PODIATRIST

## 2024-02-26 PROCEDURE — 6360000002 HC RX W HCPCS: Performed by: NURSE ANESTHETIST, CERTIFIED REGISTERED

## 2024-02-26 PROCEDURE — 6360000002 HC RX W HCPCS: Performed by: ANESTHESIOLOGY

## 2024-02-26 PROCEDURE — 2500000003 HC RX 250 WO HCPCS: Performed by: NURSE ANESTHETIST, CERTIFIED REGISTERED

## 2024-02-26 PROCEDURE — 6370000000 HC RX 637 (ALT 250 FOR IP): Performed by: PODIATRIST

## 2024-02-26 PROCEDURE — 3600000014 HC SURGERY LEVEL 4 ADDTL 15MIN: Performed by: PODIATRIST

## 2024-02-26 PROCEDURE — 3600000004 HC SURGERY LEVEL 4 BASE: Performed by: PODIATRIST

## 2024-02-26 PROCEDURE — 2580000003 HC RX 258: Performed by: NURSE ANESTHETIST, CERTIFIED REGISTERED

## 2024-02-26 PROCEDURE — 2720000010 HC SURG SUPPLY STERILE: Performed by: PODIATRIST

## 2024-02-26 DEVICE — PATCH AMNION 2 LAYR PROTCT 4 X 4CM STERISHIELD II: Type: IMPLANTABLE DEVICE | Site: FOOT | Status: FUNCTIONAL

## 2024-02-26 DEVICE — ACELLULAR DERMAL MATRIX
Type: IMPLANTABLE DEVICE | Site: FOOT | Status: FUNCTIONAL
Brand: PROLAYER XENOGRAFT

## 2024-02-26 DEVICE — GRAFT HUM TISS AMBIENT 2 CC FLOWABLE PLCNTA TISS VIAFLOW: Type: IMPLANTABLE DEVICE | Site: ANKLE | Status: FUNCTIONAL

## 2024-02-26 RX ORDER — MEPERIDINE HYDROCHLORIDE 25 MG/ML
12.5 INJECTION INTRAMUSCULAR; INTRAVENOUS; SUBCUTANEOUS EVERY 5 MIN PRN
Status: DISCONTINUED | OUTPATIENT
Start: 2024-02-26 | End: 2024-02-26 | Stop reason: HOSPADM

## 2024-02-26 RX ORDER — LIDOCAINE HYDROCHLORIDE 10 MG/ML
0.5 INJECTION, SOLUTION EPIDURAL; INFILTRATION; INTRACAUDAL; PERINEURAL ONCE
Status: DISCONTINUED | OUTPATIENT
Start: 2024-02-26 | End: 2024-02-26 | Stop reason: HOSPADM

## 2024-02-26 RX ORDER — ONDANSETRON 2 MG/ML
INJECTION INTRAMUSCULAR; INTRAVENOUS PRN
Status: DISCONTINUED | OUTPATIENT
Start: 2024-02-26 | End: 2024-02-26 | Stop reason: SDUPTHER

## 2024-02-26 RX ORDER — FENTANYL CITRATE 50 UG/ML
INJECTION, SOLUTION INTRAMUSCULAR; INTRAVENOUS PRN
Status: DISCONTINUED | OUTPATIENT
Start: 2024-02-26 | End: 2024-02-26 | Stop reason: SDUPTHER

## 2024-02-26 RX ORDER — MAGNESIUM HYDROXIDE 1200 MG/15ML
LIQUID ORAL CONTINUOUS PRN
Status: COMPLETED | OUTPATIENT
Start: 2024-02-26 | End: 2024-02-26

## 2024-02-26 RX ORDER — DEXMEDETOMIDINE HYDROCHLORIDE 100 UG/ML
INJECTION, SOLUTION INTRAVENOUS PRN
Status: DISCONTINUED | OUTPATIENT
Start: 2024-02-26 | End: 2024-02-26 | Stop reason: SDUPTHER

## 2024-02-26 RX ORDER — HYDROMORPHONE HYDROCHLORIDE 2 MG/ML
0.5 INJECTION, SOLUTION INTRAMUSCULAR; INTRAVENOUS; SUBCUTANEOUS EVERY 5 MIN PRN
Status: DISCONTINUED | OUTPATIENT
Start: 2024-02-26 | End: 2024-02-26 | Stop reason: HOSPADM

## 2024-02-26 RX ORDER — BUPIVACAINE HYDROCHLORIDE 5 MG/ML
INJECTION, SOLUTION EPIDURAL; INTRACAUDAL
Status: COMPLETED | OUTPATIENT
Start: 2024-02-26 | End: 2024-02-26

## 2024-02-26 RX ORDER — MAGNESIUM SULFATE HEPTAHYDRATE 500 MG/ML
INJECTION, SOLUTION INTRAMUSCULAR; INTRAVENOUS PRN
Status: DISCONTINUED | OUTPATIENT
Start: 2024-02-26 | End: 2024-02-26 | Stop reason: SDUPTHER

## 2024-02-26 RX ORDER — LIDOCAINE HYDROCHLORIDE 20 MG/ML
INJECTION, SOLUTION INFILTRATION; PERINEURAL PRN
Status: DISCONTINUED | OUTPATIENT
Start: 2024-02-26 | End: 2024-02-26 | Stop reason: SDUPTHER

## 2024-02-26 RX ORDER — SODIUM CHLORIDE 9 MG/ML
INJECTION, SOLUTION INTRAVENOUS CONTINUOUS
Status: DISCONTINUED | OUTPATIENT
Start: 2024-02-26 | End: 2024-02-26 | Stop reason: HOSPADM

## 2024-02-26 RX ORDER — HYDRALAZINE HYDROCHLORIDE 20 MG/ML
10 INJECTION INTRAMUSCULAR; INTRAVENOUS
Status: DISCONTINUED | OUTPATIENT
Start: 2024-02-26 | End: 2024-02-26 | Stop reason: HOSPADM

## 2024-02-26 RX ORDER — ONDANSETRON 2 MG/ML
4 INJECTION INTRAMUSCULAR; INTRAVENOUS
Status: DISCONTINUED | OUTPATIENT
Start: 2024-02-26 | End: 2024-02-26 | Stop reason: HOSPADM

## 2024-02-26 RX ORDER — PHENYLEPHRINE HCL IN 0.9% NACL 1 MG/10 ML
SYRINGE (ML) INTRAVENOUS PRN
Status: DISCONTINUED | OUTPATIENT
Start: 2024-02-26 | End: 2024-02-26 | Stop reason: SDUPTHER

## 2024-02-26 RX ORDER — PROPOFOL 10 MG/ML
INJECTION, EMULSION INTRAVENOUS PRN
Status: DISCONTINUED | OUTPATIENT
Start: 2024-02-26 | End: 2024-02-26 | Stop reason: SDUPTHER

## 2024-02-26 RX ORDER — ROCURONIUM BROMIDE 10 MG/ML
INJECTION, SOLUTION INTRAVENOUS PRN
Status: DISCONTINUED | OUTPATIENT
Start: 2024-02-26 | End: 2024-02-26 | Stop reason: SDUPTHER

## 2024-02-26 RX ORDER — LABETALOL HYDROCHLORIDE 5 MG/ML
10 INJECTION, SOLUTION INTRAVENOUS
Status: DISCONTINUED | OUTPATIENT
Start: 2024-02-26 | End: 2024-02-26 | Stop reason: HOSPADM

## 2024-02-26 RX ORDER — FAMOTIDINE 10 MG/ML
INJECTION, SOLUTION INTRAVENOUS PRN
Status: DISCONTINUED | OUTPATIENT
Start: 2024-02-26 | End: 2024-02-26 | Stop reason: SDUPTHER

## 2024-02-26 RX ORDER — OXYCODONE HYDROCHLORIDE AND ACETAMINOPHEN 5; 325 MG/1; MG/1
1 TABLET ORAL EVERY 4 HOURS PRN
Qty: 42 TABLET | Refills: 0 | Status: SHIPPED | OUTPATIENT
Start: 2024-02-26 | End: 2024-03-04

## 2024-02-26 RX ORDER — SUCCINYLCHOLINE/SOD CL,ISO/PF 200MG/10ML
SYRINGE (ML) INTRAVENOUS PRN
Status: DISCONTINUED | OUTPATIENT
Start: 2024-02-26 | End: 2024-02-26 | Stop reason: SDUPTHER

## 2024-02-26 RX ORDER — FENTANYL CITRATE 50 UG/ML
100 INJECTION, SOLUTION INTRAMUSCULAR; INTRAVENOUS ONCE
Status: COMPLETED | OUTPATIENT
Start: 2024-02-26 | End: 2024-02-26

## 2024-02-26 RX ORDER — BACITRACIN ZINC AND POLYMYXIN B SULFATE 500; 1000 [USP'U]/G; [USP'U]/G
OINTMENT TOPICAL
Status: COMPLETED | OUTPATIENT
Start: 2024-02-26 | End: 2024-02-26

## 2024-02-26 RX ORDER — OXYCODONE HYDROCHLORIDE 5 MG/1
5 TABLET ORAL
Status: DISCONTINUED | OUTPATIENT
Start: 2024-02-26 | End: 2024-02-26 | Stop reason: HOSPADM

## 2024-02-26 RX ORDER — MIDAZOLAM HYDROCHLORIDE 2 MG/2ML
2 INJECTION, SOLUTION INTRAMUSCULAR; INTRAVENOUS ONCE
Status: COMPLETED | OUTPATIENT
Start: 2024-02-26 | End: 2024-02-26

## 2024-02-26 RX ADMIN — BUPIVACAINE HYDROCHLORIDE 20 ML: 5 INJECTION, SOLUTION EPIDURAL; INTRACAUDAL at 08:37

## 2024-02-26 RX ADMIN — Medication 100 MCG: at 09:51

## 2024-02-26 RX ADMIN — MAGNESIUM SULFATE HEPTAHYDRATE 1 G: 500 INJECTION, SOLUTION INTRAMUSCULAR; INTRAVENOUS at 10:07

## 2024-02-26 RX ADMIN — PROPOFOL 200 MG: 10 INJECTION, EMULSION INTRAVENOUS at 09:41

## 2024-02-26 RX ADMIN — PHENYLEPHRINE HYDROCHLORIDE 50 MCG/MIN: 10 INJECTION INTRAVENOUS at 09:54

## 2024-02-26 RX ADMIN — SODIUM CHLORIDE: 9 INJECTION, SOLUTION INTRAVENOUS at 08:18

## 2024-02-26 RX ADMIN — Medication 100 MCG: at 09:47

## 2024-02-26 RX ADMIN — DEXMEDETOMIDINE HYDROCHLORIDE 4 MCG: 100 INJECTION, SOLUTION INTRAVENOUS at 10:22

## 2024-02-26 RX ADMIN — SODIUM CHLORIDE: 9 INJECTION, SOLUTION INTRAVENOUS at 10:21

## 2024-02-26 RX ADMIN — DEXMEDETOMIDINE HYDROCHLORIDE 6 MCG: 100 INJECTION, SOLUTION INTRAVENOUS at 11:06

## 2024-02-26 RX ADMIN — VANCOMYCIN HYDROCHLORIDE 2000 MG: 1 INJECTION, POWDER, LYOPHILIZED, FOR SOLUTION INTRAVENOUS at 08:19

## 2024-02-26 RX ADMIN — FENTANYL CITRATE 100 MCG: 50 INJECTION, SOLUTION INTRAMUSCULAR; INTRAVENOUS at 09:41

## 2024-02-26 RX ADMIN — ROCURONIUM BROMIDE 25 MG: 10 INJECTION, SOLUTION INTRAVENOUS at 09:52

## 2024-02-26 RX ADMIN — ROCURONIUM BROMIDE 10 MG: 10 INJECTION, SOLUTION INTRAVENOUS at 09:41

## 2024-02-26 RX ADMIN — Medication 50 MCG: at 11:36

## 2024-02-26 RX ADMIN — Medication 200 MCG: at 09:46

## 2024-02-26 RX ADMIN — ONDANSETRON 4 MG: 2 INJECTION INTRAMUSCULAR; INTRAVENOUS at 09:46

## 2024-02-26 RX ADMIN — BUPIVACAINE HYDROCHLORIDE 10 ML: 5 INJECTION, SOLUTION EPIDURAL; INTRACAUDAL at 08:41

## 2024-02-26 RX ADMIN — LIDOCAINE HYDROCHLORIDE 60 MG: 20 INJECTION, SOLUTION INFILTRATION; PERINEURAL at 09:41

## 2024-02-26 RX ADMIN — SUGAMMADEX 200 MG: 100 INJECTION, SOLUTION INTRAVENOUS at 11:21

## 2024-02-26 RX ADMIN — FENTANYL CITRATE 100 MCG: 50 INJECTION INTRAMUSCULAR; INTRAVENOUS at 08:36

## 2024-02-26 RX ADMIN — FAMOTIDINE 20 MG: 10 INJECTION INTRAVENOUS at 09:34

## 2024-02-26 RX ADMIN — Medication 140 MG: at 09:42

## 2024-02-26 RX ADMIN — MIDAZOLAM 2 MG: 1 INJECTION INTRAMUSCULAR; INTRAVENOUS at 08:35

## 2024-02-26 ASSESSMENT — LIFESTYLE VARIABLES: SMOKING_STATUS: 0

## 2024-02-26 ASSESSMENT — PAIN - FUNCTIONAL ASSESSMENT: PAIN_FUNCTIONAL_ASSESSMENT: 0-10

## 2024-02-26 NOTE — DISCHARGE INSTRUCTIONS
[facility]   [address]  [place], [place] [address]  [phone]    Dr. Giancarlo Collins                                             Post Operative Instructions    1.  Have Prescriptions filled and take as directed.  All medications should be taken with food or milk.    2.  Keep foot elevated six inches above the level of the heart.  Support feet, legs, and knees with pillows.    3.  KEEP FOOT ELEVATED AS MUCH AS POSSIBLE UNTIL YOUR NEXT VISIT    4.  Place an ice pack on the bandaged site for 15 minutes every hour while awake    5.  Keep dressing clean, dry, and intact.  DO NOT REMOVE DRESSING.  CALL THE OFFICE IF BANDAGE COMES OFF.    6.  For the first 7 days after surgery, take temperature by mouth three times a day.  Call the office if greater than 101F.    7.  Ambulate with NON weight bearing to the right lower extremity with the use of crutches / walker.    8.  All instructions are to be followed until otherwise instructed by your surgeon.    9.  Call our office if you have any concerns or questions which arise.  Our phones are answered 24 hours a day.  [phone]    10.  Your first post-operative appointment is scheduled, call the office for appointment date and time if not known prior to today.     ORTHOPEDIC/PODIATRY DISCHARGE INSTRUCTIONS    Follow your surgeons instructions.  Make follow-up appointment.  Observe operative area for signs of excessive bleeding such as a slow general ooze that saturates the dressing or bright red bleeding. In either case, apply pressure to the area and elevate if possible and call your surgeon right away.  Observe the affected extremity for circulation or nerve impairment such as a change in color, numbness, tingling, coldness or increased pain. If any of these symptoms are present call your surgeon.  Observe operative site for any signs of infection such as increased pain, redness, fever greater than 101 degrees, swelling,

## 2024-02-26 NOTE — ANESTHESIA PRE PROCEDURE
Department of Anesthesiology  Preprocedure Note       Name:  Andrés Howell   Age:  43 y.o.  :  1980                                          MRN:  [id number]         Date:  2024      Surgeon: Surgeon(s):  Giancarlo Collins DPM    Procedure: Procedure(s):  TRANSMETATARSAL AMPUTATION-RIGHT FOOT-POPLITEAL BLOCK, SAPHENOUS-JESSICA  TENDO-ACHILLES LENGTHENING-RIGHT ANKLE; SPLIT TIBIALIS TENDON TRANSFER-RIGHT LOWER EXTREMITY  SURGICAL PREPARATION OF WOUND BED-RIGHT FOOT; APPLICATION OF DERMAL GRAFT SUBSTITUTE-RIGHT FOOT; APPLICATION BELOW KNEE SPLINT-RIGHT LOWER LEG    Medications prior to admission:   Prior to Admission medications    Medication Sig Start Date End Date Taking? Authorizing Provider   rivaroxaban (XARELTO) 10 MG TABS tablet Take 1 tablet by mouth every 24 hours To start after surgery  Patient not taking: Reported on 2024   Yes Luz Marina Moser MD   Dulaglutide (TRULICITY) 0.75 MG/0.5ML SOPN Inject 0.75 mg into the skin once a week Takes on    Yes ProviderLuz Marina MD   venlafaxine (EFFEXOR XR) 75 MG extended release capsule Take 1 capsule by mouth daily   Yes ProviderLuz Marina MD   Cholecalciferol (VITAMIN D) 50 MCG (2000 UT) CAPS capsule Take 2,000 Units by mouth daily   Yes ProviderLuz Marina MD   vitamin B-12 (CYANOCOBALAMIN) 1000 MCG tablet Take 1 tablet by mouth daily   Yes ProviderLuz Marina MD   ferrous sulfate (IRON 325) 325 (65 Fe) MG tablet Take 1 tablet by mouth daily (with breakfast)   Yes ProviderLuz Marina MD   Gauze Pads & Dressings 4\"X4\" PADS 30 each by Does not apply route daily 22   Evelio Leblanc DPM   Gauze Pads & Dressings (KERLIX BANDAGE ROLL) MISC 60 each by Does not apply route daily 22   Evelio Leblanc DPM   buprenorphine-naloxone (SUBOXONE) 12-3 MG sublingual film Place 1 Film under the tongue daily.    Luz Marina Moser MD   buPROPion (WELLBUTRIN XL) 150 MG extended release tablet Take 100 mg by mouth every morning To

## 2024-02-26 NOTE — BRIEF OP NOTE
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]    Post-Op Diagnosis: Same       Procedure(s):  18402 - TENDO-ACHILLES LENGTHENING, RIGHT ANKLE  94336 - TRANSMETATARSAL AMPUTATION, RIGHT FOOT  24907 - SPLIT TIBIALIS TENDON TRANSFER, RIGHT LOWER EXTREMITY  83614 - SURGICAL PREPARATION OF WOUND BED, RIGHT FOOT  65060 - APPLICATION OF DERMAL GRAFT SUBSTITUTE, RIGHT FOOT  17609 - APPLICATION BELOW KNEE SPLINT, RIGHT LOWER LEG    Surgeon(s):  Giancarlo Collins DPM    Assistant:  Resident: Evelio Leblanc, PGY-III  Student: Emily Saavedra, MS-IV    Hemostasis: Pneumatic Calf Tourniquet @ 250 mmHg (116 minutes), Electrocautery, and Anatomic Dissection     Injectables: None    Materials: 3-0 Vicryl and 3-0 Nylon    1x Ruben ProLayer 2x5cm  2cc Viaflow  1x 4x4cm SteriShield  1x 4.5x24mm Citrefix Xpress Amelia    Anesthesia: General with Pre-Operative Popliteal Block    Estimated Blood Loss (mL): Minimal    Complications: None    Specimens:   * No specimens in log *    Implants:  Implant Name Type Inv. Item Serial No.  Lot No. LRB No. Used Action   PROLAYERXENOGRAFT 2 CMX5 CM - [id number]  PROLAYERXENOGRAFT 2 CMX5 CM [id number] [employer] [facility]- [id number] Right 1 Implanted   PATCH AMNION 2 LAYR PROTCT 4 X 4CM STERISHIELD II - [id number]  PATCH AMNION 2 LAYR PROTCT 4 X 4CM STERISHIELD II [id number] [employer]-  Right 1 Implanted   4.5 x 24mm CITREFIX XPRESS SYSTEM    [employer] [facility]- [id number] Right 1 Implanted   GRAFT HUM TISS AMBIENT 2 CC FLOWABLE PLCNTA TISS VIAFLOW - /05/08  GRAFT HUM TISS AMBIENT 2

## 2024-02-26 NOTE — PROGRESS NOTES
Discharge instructions review with patient. All home medications have been reviewed, pt v/u.      Pt placed on hold at this time waiting for transportation home.

## 2024-02-26 NOTE — PROGRESS NOTES
Pt arrived from OR to PACU bay 7. Report received from OR staff. Pt arousable to voice. Surgical incisions dressings in place to R foot. Pt on 6L simple mask, NSR, and VSS. Will continue to monitor.

## 2024-02-26 NOTE — PROGRESS NOTES
Pt discharged via wheelchair. Pt discharged with 1 RX and all belongings. Mom Lata taking stable pt home.

## 2024-02-26 NOTE — ANESTHESIA PROCEDURE NOTES
Peripheral Block    Patient location during procedure: pre-op  Reason for block: post-op pain management and at surgeon's request  Start time: 2/26/2024 8:37 AM  End time: 2/26/2024 8:39 AM  Staffing  Performed: anesthesiologist   Anesthesiologist: Nhan Chang MD  Performed by: Nhan Chang MD  Authorized by: Nhan Chang MD    Preanesthetic Checklist  Completed: patient identified, IV checked, site marked, risks and benefits discussed, surgical/procedural consents, equipment checked, pre-op evaluation, timeout performed, anesthesia consent given, oxygen available and monitors applied/VS acknowledged  Peripheral Block   Patient position: left lateral decubitus  Prep: ChloraPrep  Provider prep: mask and sterile gloves  Patient monitoring: cardiac monitor, continuous pulse ox, frequent blood pressure checks, IV access, oxygen and responsive to questions  Block type: Sciatic  Popliteal  Laterality: right  Injection technique: single-shot  Guidance: ultrasound guided  Local infiltration: lidocaine  Infiltration strength: 1 %  Local infiltration: lidocaine  Dose: 1 mL    Needle   Needle type: insulated echogenic nerve stimulator needle   Needle gauge: 20 G  Needle localization: ultrasound guidance and anatomical landmarks  Needle length: 10 cm  Assessment   Injection assessment: negative aspiration for heme, no paresthesia on injection, local visualized surrounding nerve on ultrasound and no intravascular symptoms  Paresthesia pain: none  Slow fractionated injection: yes  Hemodynamics: stable  Outcomes: uncomplicated and patient tolerated procedure well    Additional Notes  U/S [id number].  (1) Under ultrasound guidance, a 20 gauge needle was inserted and placed in close proximity to the popliteal nerve.  (2) Ultrasound was also used to visualize the spread of the anesthetic in close proximity to the nerve being blocked. (3) The nerve appeared anatomically normal, and (4 there were no apparent

## 2024-02-26 NOTE — ANESTHESIA POSTPROCEDURE EVALUATION
Department of Anesthesiology  Postprocedure Note    Patient: Andrés Howell  MRN: [id number]  YOB: 1980  Date of evaluation: 2/26/2024    Procedure Summary       Date: 02/26/24 Room / Location: [address]    Anesthesia Start: 0938 Anesthesia Stop: 1204    Procedures:       TRANSMETATARSAL AMPUTATION-RIGHT FOOT-POPLITEAL BLOCK, SAPHENOUS-JESSICA (Right: Foot)      TENDO-ACHILLES LENGTHENING-RIGHT ANKLE; SPLIT TIBIALIS TENDON TRANSFER-RIGHT LOWER EXTREMITY (Right: Ankle)      SURGICAL PREPARATION OF WOUND BED-RIGHT FOOT; APPLICATION OF DERMAL GRAFT SUBSTITUTE-RIGHT FOOT; APPLICATION BELOW KNEE SPLINT-RIGHT LOWER LEG (Right: Foot) Diagnosis:       Osteomyelitis of ankle or foot, right, acute (HCC)      Achilles tendon contracture, right      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    Nasima Phase I: Nasima Score: 7    Nasima Phase II:      Anesthesia Post Evaluation    Patient location during evaluation: PACU  Patient participation: complete - patient participated  Level of consciousness: awake and alert  Airway patency: patent  Nausea & Vomiting: no vomiting and no nausea  Cardiovascular status: hemodynamically stable  Respiratory status: acceptable  Hydration status: stable  Pain management: adequate    No notable events documented.

## 2024-02-26 NOTE — ANESTHESIA PROCEDURE NOTES
Peripheral Block    Patient location during procedure: pre-op  Reason for block: post-op pain management and at surgeon's request  Start time: 2/26/2024 8:41 AM  End time: 2/26/2024 8:42 AM  Staffing  Performed: anesthesiologist   Anesthesiologist: Nhan Chang MD  Performed by: Nhan Chang MD  Authorized by: Nhan Chang MD    Preanesthetic Checklist  Completed: patient identified, IV checked, site marked, risks and benefits discussed, surgical/procedural consents, equipment checked, pre-op evaluation, timeout performed, anesthesia consent given, oxygen available and monitors applied/VS acknowledged  Peripheral Block   Patient position: supine  Prep: ChloraPrep  Provider prep: mask and sterile gloves  Patient monitoring: cardiac monitor, continuous pulse ox, frequent blood pressure checks, IV access, oxygen and responsive to questions  Block type: Femoral  Adductor canal  Laterality: right  Injection technique: single-shot  Guidance: ultrasound guided  Local infiltration: lidocaine  Infiltration strength: 1 %  Local infiltration: lidocaine  Dose: 1 mL    Needle   Needle type: insulated echogenic nerve stimulator needle   Needle gauge: 20 G  Needle localization: ultrasound guidance and anatomical landmarks  Needle length: 10 cm  Assessment   Injection assessment: negative aspiration for heme, no paresthesia on injection, local visualized surrounding nerve on ultrasound and no intravascular symptoms  Paresthesia pain: none  Slow fractionated injection: yes  Hemodynamics: stable  Outcomes: uncomplicated and patient tolerated procedure well    Additional Notes  U/S 67357.  (1) Under ultrasound guidance, a 20 gauge needle was inserted and placed in close proximity to the ac nerve.  (2) Ultrasound was also used to visualize the spread of the anesthetic in close proximity to the nerve being blocked. (3) The nerve appeared anatomically normal, and (4 there were no apparent abnormal

## 2024-02-26 NOTE — H&P
Date of Surgery Update:  Andrés Howell was seen, history and physical examination reviewed, and patient examined by me today. There have been no significant clinical changes since the completion of the previous history and physical.    The nature of the procedure, possible complications, alternative forms of therapy, post-op course, and post-op goals have been explained to patient (or appropriate guardian) and patient's family and understanding was verbalized.  All questions have been answered. The consent has been signed.  Patient's surgical site has been marked. Patient wishes to proceed with surgery.    Discussed with Dr. Giancarlo Collins DPM.    Evelio Leblanc DPM  02/26/24  9:26 AM

## 2025-06-27 ENCOUNTER — HOSPITAL ENCOUNTER (EMERGENCY)
Age: 45
Discharge: HOME | End: 2025-06-27
Attending: EMERGENCY MEDICINE
Payer: COMMERCIAL

## 2025-06-27 ENCOUNTER — APPOINTMENT (OUTPATIENT)
Dept: CT IMAGING | Age: 45
End: 2025-06-27
Payer: COMMERCIAL

## 2025-06-27 VITALS
SYSTOLIC BLOOD PRESSURE: 168 MMHG | WEIGHT: 245.81 LBS | OXYGEN SATURATION: 100 % | HEIGHT: 75 IN | HEART RATE: 85 BPM | BODY MASS INDEX: 30.56 KG/M2 | RESPIRATION RATE: 16 BRPM | DIASTOLIC BLOOD PRESSURE: 103 MMHG | TEMPERATURE: 98.8 F

## 2025-06-27 DIAGNOSIS — K57.32 DIVERTICULITIS OF COLON: Primary | ICD-10-CM

## 2025-06-27 LAB
BASOPHILS # BLD: 0 K/UL (ref 0–0.2)
BASOPHILS NFR BLD: 0.3 %
DEPRECATED RDW RBC AUTO: 13.7 % (ref 12.4–15.4)
EOSINOPHIL # BLD: 0.1 K/UL (ref 0–0.6)
EOSINOPHIL NFR BLD: 0.4 %
HCT VFR BLD AUTO: 43.3 % (ref 40.5–52.5)
HGB BLD-MCNC: 13.9 G/DL (ref 13.5–17.5)
LYMPHOCYTES # BLD: 1.7 K/UL (ref 1–5.1)
LYMPHOCYTES NFR BLD: 9.7 %
MCH RBC QN AUTO: 26.4 PG (ref 26–34)
MCHC RBC AUTO-ENTMCNC: 32.1 G/DL (ref 31–36)
MCV RBC AUTO: 82.2 FL (ref 80–100)
MONOCYTES # BLD: 0.8 K/UL (ref 0–1.3)
MONOCYTES NFR BLD: 4.6 %
NEUTROPHILS # BLD: 15.2 K/UL (ref 1.7–7.7)
NEUTROPHILS NFR BLD: 85 %
PLATELET # BLD AUTO: 286 K/UL (ref 135–450)
PMV BLD AUTO: 7.8 FL (ref 5–10.5)
RBC # BLD AUTO: 5.26 M/UL (ref 4.2–5.9)
WBC # BLD AUTO: 17.9 K/UL (ref 4–11)

## 2025-06-27 PROCEDURE — 85025 COMPLETE CBC W/AUTO DIFF WBC: CPT

## 2025-06-27 PROCEDURE — 6370000000 HC RX 637 (ALT 250 FOR IP): Performed by: EMERGENCY MEDICINE

## 2025-06-27 PROCEDURE — 99284 EMERGENCY DEPT VISIT MOD MDM: CPT

## 2025-06-27 PROCEDURE — 70450 CT HEAD/BRAIN W/O DYE: CPT

## 2025-06-27 PROCEDURE — 74176 CT ABD & PELVIS W/O CONTRAST: CPT

## 2025-06-27 RX ORDER — CIPROFLOXACIN 500 MG/1
500 TABLET, FILM COATED ORAL ONCE
Status: COMPLETED | OUTPATIENT
Start: 2025-06-27 | End: 2025-06-27

## 2025-06-27 RX ORDER — CIPROFLOXACIN 500 MG/1
500 TABLET, FILM COATED ORAL 2 TIMES DAILY
Qty: 14 TABLET | Refills: 0 | Status: SHIPPED | OUTPATIENT
Start: 2025-06-27 | End: 2025-07-04

## 2025-06-27 RX ORDER — ONDANSETRON 4 MG/1
4 TABLET, ORALLY DISINTEGRATING ORAL ONCE
Status: COMPLETED | OUTPATIENT
Start: 2025-06-27 | End: 2025-06-27

## 2025-06-27 RX ORDER — HYDROCODONE BITARTRATE AND ACETAMINOPHEN 5; 325 MG/1; MG/1
1 TABLET ORAL ONCE
Status: DISCONTINUED | OUTPATIENT
Start: 2025-06-27 | End: 2025-06-27 | Stop reason: HOSPADM

## 2025-06-27 RX ORDER — HYDROCODONE BITARTRATE AND ACETAMINOPHEN 5; 325 MG/1; MG/1
1 TABLET ORAL EVERY 4 HOURS PRN
Qty: 18 TABLET | Refills: 0 | Status: SHIPPED | OUTPATIENT
Start: 2025-06-27 | End: 2025-06-30

## 2025-06-27 RX ORDER — ONDANSETRON 4 MG/1
4 TABLET, ORALLY DISINTEGRATING ORAL 3 TIMES DAILY PRN
Qty: 21 TABLET | Refills: 0 | Status: SHIPPED | OUTPATIENT
Start: 2025-06-27

## 2025-06-27 RX ORDER — METRONIDAZOLE 500 MG/1
500 TABLET ORAL ONCE
Status: COMPLETED | OUTPATIENT
Start: 2025-06-27 | End: 2025-06-27

## 2025-06-27 RX ORDER — DULAGLUTIDE 0.75 MG/.5ML
INJECTION, SOLUTION SUBCUTANEOUS
COMMUNITY
Start: 2025-06-23

## 2025-06-27 RX ORDER — METRONIDAZOLE 500 MG/1
500 TABLET ORAL 2 TIMES DAILY
Qty: 14 TABLET | Refills: 0 | Status: SHIPPED | OUTPATIENT
Start: 2025-06-27 | End: 2025-07-04

## 2025-06-27 RX ADMIN — CIPROFLOXACIN HYDROCHLORIDE 500 MG: 500 TABLET, FILM COATED ORAL at 13:29

## 2025-06-27 RX ADMIN — METRONIDAZOLE 500 MG: 500 TABLET ORAL at 13:29

## 2025-06-27 RX ADMIN — ONDANSETRON 4 MG: 4 TABLET, ORALLY DISINTEGRATING ORAL at 13:29

## 2025-06-27 ASSESSMENT — PAIN DESCRIPTION - ORIENTATION
ORIENTATION: LOWER

## 2025-06-27 ASSESSMENT — PAIN DESCRIPTION - PAIN TYPE
TYPE: ACUTE PAIN
TYPE: ACUTE PAIN

## 2025-06-27 ASSESSMENT — PAIN DESCRIPTION - LOCATION
LOCATION: ABDOMEN

## 2025-06-27 ASSESSMENT — PAIN - FUNCTIONAL ASSESSMENT
PAIN_FUNCTIONAL_ASSESSMENT: 0-10

## 2025-06-27 ASSESSMENT — PAIN DESCRIPTION - DESCRIPTORS: DESCRIPTORS: SHARP

## 2025-06-27 ASSESSMENT — PAIN SCALES - GENERAL
PAINLEVEL_OUTOF10: 3

## 2025-06-27 NOTE — ED PROVIDER NOTES
Emergency Department Encounter    Patient: Andrés Howell  MRN: [id number]  : 1980  Date of Evaluation: 2025  ED Provider:  LISA DOS SANTOS MD    Triage Chief Complaint:   Rectal Bleeding (Last night, sts \"went to the bathroom and bowels were runny, bright red blood\" 3 episodes last night, 2x this am. No n/v +lower abd pain. -blood thinners. )    Torres Martinez:  Andrés Howell is a 44 y.o. male that presents to the ER for evaluation abdominal pain scant rectal bleeding mucus discharge and left lower quadrant pain.  No anticoagulation afebrile    ROS - see HPI, below listed is current ROS at time of my eval:  General:  No fevers, no chills, no weakness  Eyes:  no discharge  ENT:  No sore throat, no nasal congestion  Cardiovascular:  No chest pain, no palpitations  Respiratory:  No shortness of breath, no cough, no wheezing  Gastrointestinal:  + pain, + nausea, no vomiting, no diarrhea  Musculoskeletal:  No muscle pain, no joint pain  Skin:  No rash, no pruritis  Neurologic:  no headache  Genitourinary:  No dysuria, no hematuria  Endocrine:  No unexpected weight gain, no unexpected weight loss  Extremities:  no edema, no pain    Past Medical History:   Diagnosis Date    Depression     Diabetes mellitus (HCC)     Hyperlipidemia     Hypertension     Osteoarthritis of both knees     Osteomyelitis (HCC)     FOOT      Past Surgical History:   Procedure Laterality Date    ACHILLES TENDON SURGERY Left 2022    TENDO ACHILLES LENGTHENING LEFT LOWER EXTREMITY performed by Giancarlo Collins DPM at [facility] OR    ANKLE SURGERY Right 2024    TENDO-ACHILLES LENGTHENING-RIGHT ANKLE; SPLIT TIBIALIS TENDON TRANSFER-RIGHT LOWER EXTREMITY performed by Giancarlo Collins DPM at [facility] OR    FOOT DEBRIDEMENT Right 2024    SURGICAL PREPARATION OF WOUND BED-RIGHT FOOT; APPLICATION OF DERMAL GRAFT SUBSTITUTE-RIGHT FOOT; APPLICATION BELOW KNEE SPLINT-RIGHT LOWER LEG performed by Giancarlo Collins DPM at [facility] OR

## (undated) DEVICE — COVER,TABLE,HEAVY DUTY,77"X90",STRL: Brand: MEDLINE

## (undated) DEVICE — INTENDED FOR TISSUE SEPARATION, AND OTHER PROCEDURES THAT REQUIRE A SHARP SURGICAL BLADE TO PUNCTURE OR CUT.: Brand: BARD-PARKER ® CARBON RIB-BACK BLADES

## (undated) DEVICE — Z CONVERTED USE 2273232 BANDAGE COMPR W6INXL11YD E KNIT DBL SELF CLSR EZE-BAND

## (undated) DEVICE — SUTURE VCRL SZ 3-0 L27IN ABSRB UD L26MM CT-2 1/2 CIR J232H

## (undated) DEVICE — INTENDED FOR TISSUE SEPARATION, AND OTHER PROCEDURES THAT REQUIRE A SHARP SURGICAL BLADE TO PUNCTURE OR CUT.: Brand: BARD-PARKER ® STAINLESS STEEL BLADES

## (undated) DEVICE — SWAB SPEC COLLCTN DBL W/O CHAR CULTURESWAB +

## (undated) DEVICE — C-WIRE PAK DOUBLE ENDED ORTHOPAEDIC WIRE, SPADE, .062" (1.57 MM)
Type: IMPLANTABLE DEVICE | Site: FOOT | Status: NON-FUNCTIONAL
Brand: C-WIRE
Removed: 2024-02-26

## (undated) DEVICE — MICRO SAGITTAL BLADE (9.4 X 0.4 X 26.2MM)

## (undated) DEVICE — GLOVE ORANGE PI 8   MSG9080

## (undated) DEVICE — HYPODERMIC SAFETY NEEDLE: Brand: MAGELLAN

## (undated) DEVICE — BANDAGE COMPR W4INXL5YD TAN BRTH SELF ADH WRP W/ HND TEAR

## (undated) DEVICE — STRIP,CLOSURE,WOUND,MEDI-STRIP,1/2X4: Brand: MEDLINE

## (undated) DEVICE — PODIATRY PK

## (undated) DEVICE — SUTURE ABSORBABLE MONOFILAMENT 4-0 RB1 27 IN UD MONOCRYL + MCP214H

## (undated) DEVICE — SUTURE VCRL SZ 3-0 L27IN ABSRB UD L19MM PS-2 3/8 CIR PRIM J427H

## (undated) DEVICE — PADDING CAST W4INXL4YD HIGHLY ABSRB THAN COT EZ APPL

## (undated) DEVICE — SYRINGE MED 10ML LUERLOCK TIP W/O SFTY DISP

## (undated) DEVICE — SUTURE VCRL SZ 3-0 L27IN ABSRB UD L26MM SH 1/2 CIR J416H

## (undated) DEVICE — BANDAGE GZ W2XL75IN ST RAYON POLY CNFRM STRTCH LTWT

## (undated) DEVICE — PODIATRY: Brand: MEDLINE INDUSTRIES, INC.

## (undated) DEVICE — SUTURE VCRL + SZ 3-0 L18IN ABSRB UD SH 1/2 CIR TAPERCUT NDL VCP864D

## (undated) DEVICE — BANDAGE COMPR W4INXL10YD WHITE/BEIGE E MTRX HK LOOP CLSR

## (undated) DEVICE — ADHESIVE SKIN CLSR 0.7ML TOP DERMBND ADV

## (undated) DEVICE — MASC TURNOVER KIT: Brand: MEDLINE INDUSTRIES, INC.

## (undated) DEVICE — BANDAGE COBAN 6 IN WND 6INX5YD FOAM

## (undated) DEVICE — E-Z CLEAN, NON-STICK, PTFE COATED, ELECTROSURGICAL BLADE ELECTRODE, MODIFIED EXTENDED INSULATION, 2.5 INCH (6.35 CM): Brand: MEGADYNE

## (undated) DEVICE — MASTISOL ADHESIVE LIQ 2/3ML

## (undated) DEVICE — SOLUTION IV 1000ML 0.9% SOD CHL

## (undated) DEVICE — COVER,MAYO STAND,XL,STERILE: Brand: MEDLINE

## (undated) DEVICE — JEWISH HOSPITAL TURNOVER KIT: Brand: MEDLINE INDUSTRIES, INC.

## (undated) DEVICE — 3M™ STERI-STRIP™ REINFORCED ADHESIVE SKIN CLOSURES, R1547, 1/2 IN X 4 IN (12 MM X 100 MM), 6 STRIPS/ENVELOPE: Brand: 3M™ STERI-STRIP™

## (undated) DEVICE — COAXIAL HIGH FLOW TIP WITH SOFT SHIELD

## (undated) DEVICE — PAD ABSRB W8XL10IN ABD HYDROPHOBIC NONWOVEN THCK LAYR CELOS

## (undated) DEVICE — COVER LT HNDL BLU PLAS

## (undated) DEVICE — APPLICATOR MEDICATED 26 CC SOLUTION HI LT ORNG CHLORAPREP

## (undated) DEVICE — BANDAGE,GAUZE,BULKEE II,4.5"X4.1YD,STRL: Brand: MEDLINE

## (undated) DEVICE — PRECISION THIN (9.0 X 0.38 X 18.5MM)

## (undated) DEVICE — SUTURE VCRL SZ 4-0 L27IN ABSRB UD L26MM SH 1/2 CIR J415H

## (undated) DEVICE — GOWN,SIRUS,POLYRNF,BRTHSLV,XL,30/CS: Brand: MEDLINE

## (undated) DEVICE — SUTURE MCRYL SZ 4-0 L27IN ABSRB UD L19MM PS-2 1/2 CIR PRIM Y426H

## (undated) DEVICE — MEDICINE CUP, GRADUATED, STER: Brand: MEDLINE

## (undated) DEVICE — ZIMMER® STERILE DISPOSABLE TOURNIQUET CUFF WITH PLC, DUAL PORT, SINGLE BLADDER, 18 IN. (46 CM)

## (undated) DEVICE — E-Z CLEAN, NON-STICK, PTFE COATED, ELECTROSURGICAL BLADE ELECTRODE, 2.5 INCH (6.35 CM): Brand: EZ CLEAN

## (undated) DEVICE — ROYAL SILK SURGICAL GOWN, XXXL, LONG: Brand: CONVERTORS

## (undated) DEVICE — PLATE ES AD W 9FT CRD 2

## (undated) DEVICE — SUTURE VCRL + SZ 5 0 L18IN ABSRB UD PS 2 L19MM PRIM REV CUT VCP495G

## (undated) DEVICE — RASP SURG L W0.27XL0.55IN TEAR CRSS CUT MIC RECIP SAW

## (undated) DEVICE — PRECISION OFFSET (9.0 X 0.64 X 35.0MM)

## (undated) DEVICE — TRAY PREP DRY W/ PREM GLV 2 APPL 6 SPNG 2 UNDPD 1 OVERWRAP

## (undated) DEVICE — DRESSING STERILE PETRO W3XL8IN N ADH OIL EMUL GZ CURAD

## (undated) DEVICE — GLOVE SURG SZ 85 L12IN FNGR THK13MIL BRN LTX SYN POLYMER W

## (undated) DEVICE — SPONGE,LAP,18"X18",DLX,XR,ST,5/PK,40/PK: Brand: MEDLINE

## (undated) DEVICE — GLOVE ORANGE PI 7 1/2   MSG9075

## (undated) DEVICE — TOWEL,OR,DSP,ST,BLUE,DLX,8/PK,10PK/CS: Brand: MEDLINE

## (undated) DEVICE — SHEET,DRAPE,53X77,STERILE: Brand: MEDLINE

## (undated) DEVICE — GLOVE ORTHO 7 1/2   MSG9475

## (undated) DEVICE — ZIMMER® STERILE DISPOSABLE TOURNIQUET CUFF WITH PLC, DUAL PORT, DUAL BLADDER, 18 IN. (46 CM)

## (undated) DEVICE — GARMENT,MEDLINE,DVT,INT,CALF,MED, GEN2: Brand: MEDLINE

## (undated) DEVICE — T-DRAPE,EXTREMITY,STERILE: Brand: MEDLINE

## (undated) DEVICE — GOWN SIRUS NONREIN LG W/TWL: Brand: MEDLINE INDUSTRIES, INC.

## (undated) DEVICE — GLOVE ORTHO 8   MSG9480

## (undated) DEVICE — PROTECTOR ULN NRV PUR FOAM HK LOOP STRP ANATOMICALLY

## (undated) DEVICE — SUTURE PERMA-HAND SZ 3-0 L144IN NONABSORBABLE BLK LIGAPAK LA54G

## (undated) DEVICE — SPLINT CAST W5XL30IN RL FRM WRINKLE FREE INTLOK PERFRMANCE

## (undated) DEVICE — HANDPIECE SUCTION TUBING INTERPULSE 10FT

## (undated) DEVICE — TURNOVER KIT RM INF CTRL TECH

## (undated) DEVICE — SUTURE VCRL + SZ 3-0 L18IN ABSRB UD PS-2 3/8 CIR REV CUT VCP497H

## (undated) DEVICE — BANDAGE COMPR W4INXL12FT E DISP ESMARCH EVEN

## (undated) DEVICE — MAJOR SET UP PK

## (undated) DEVICE — ELECTROSURGICAL PENCIL ROCKER SWITCH NON COATED BLADE ELECTRODE 10 FT (3 M) CORD HOLSTER: Brand: MEGADYNE

## (undated) DEVICE — GOWN SIRUS NONREIN XL W/TWL: Brand: MEDLINE INDUSTRIES, INC.

## (undated) DEVICE — PENCIL ES ULT VAC W TELSCP NOSE EZ CLN BLDE 10FT

## (undated) DEVICE — 3M™ COBAN™ NL STERILE NON-LATEX SELF-ADHERENT WRAP, 2084S, 4 IN X 5 YD (10 CM X 4,5 M), 18 ROLLS/CASE: Brand: 3M™ COBAN™

## (undated) DEVICE — PADDING CAST W4INXL4YD ST COT RAYON MICROPLEATED HIGHLY

## (undated) DEVICE — STERILE PVP: Brand: MEDLINE INDUSTRIES, INC.

## (undated) DEVICE — TOWEL,STOP FLAG GOLD N-W: Brand: MEDLINE

## (undated) DEVICE — SUTURE MCRYL SZ 5-0 L18IN ABSRB UD L19MM PS-2 3/8 CIR PRIM Y495G

## (undated) DEVICE — SUTURE VCRL + SZ 3-0 L27IN ABSRB UD CT-2 L26MM 1/2 CIR TAPR VCP232H

## (undated) DEVICE — SUTURE ETHBND EXCEL SZ 2-0 L36IN NONABSORBABLE GRN L26MM SH X523H

## (undated) DEVICE — SUTURE ETHLN SZ 4-0 L18IN NONABSORBABLE BLK L19MM PS-2 3/8 1667H

## (undated) DEVICE — SUTURE PDS II SZ 4-0 L18IN ABSRB UD P-3 L13MM 3/8 CIR PRIM Z494G

## (undated) DEVICE — LOOP,VESSEL,MAXI,BLUE,2/PK,STERILE: Brand: MEDLINE

## (undated) DEVICE — PMI DISPOSABLE PUNCTURE CLOSURE DEVICE / SUTURE GRASPER: Brand: PMI

## (undated) DEVICE — Device

## (undated) DEVICE — SWAB CULTURET AMIES DBL

## (undated) DEVICE — BANDAGE COMPR W6INXL10YD ST M E WHITE/BEIGE

## (undated) DEVICE — DRESSING PETRO W3XL3IN OIL EMUL N ADH GZ KNIT IMPREG CELOS

## (undated) DEVICE — Z INACTIVE NO SUPPLIER SOLUTIONIRRIG 3000ML 0.9% SOD CHL FLX CONT [79720808] [HOSPIRA WORLDWIDE INC]

## (undated) DEVICE — SOLUTION IRRIG 500ML 0.9% SOD CHLO USP POUR PLAS BTL

## (undated) DEVICE — SOLUTION IRRIG 3000ML 0.9% SOD CHL USP UROMATIC PLAS CONT

## (undated) DEVICE — HEAD & NECK: Brand: MEDLINE INDUSTRIES, INC.

## (undated) DEVICE — SUTURE VCRL + SZ 4-0 L18IN ABSRB UD L19MM PS-2 3/8 CIR PRIM VCP496H

## (undated) DEVICE — GAUZE,SPONGE,4"X4",8PLY,STRL,LF,10/TRAY: Brand: MEDLINE

## (undated) DEVICE — SUTURE ETHLN SZ 3-0 L18IN NONABSORBABLE BLK PS-2 L19MM 3/8 1669H

## (undated) DEVICE — SUTURE N ABSRB BRAIDED 2-0 CT-3 22 MM W/ 20 IN LOOP 1/2 CIR 3910900062

## (undated) DEVICE — PRECISION THIN (9.0 X 0.38 X 31.0MM)

## (undated) DEVICE — SUTURE MCRYL SZ 5-0 L18IN ABSRB UD L13MM P-3 3/8 CIR PRIM Y493G

## (undated) DEVICE — TOWEL,OR,DSP,ST,BLUE,STD,4/PK,20PK/CS: Brand: MEDLINE

## (undated) DEVICE — SYRINGE, LUER LOCK, 10ML: Brand: MEDLINE

## (undated) DEVICE — DRESSING,GAUZE,XEROFORM,CURAD,1"X8",ST: Brand: CURAD

## (undated) DEVICE — PACK PROCEDURE SURG EXTREMITY MFFOP CUST

## (undated) DEVICE — BANDAGE COMPR W4INXL5YD BGE HI E W/ REM CLP SURE-WRAP

## (undated) DEVICE — PREMIUM WET SKIN PREP TRAY: Brand: MEDLINE INDUSTRIES, INC.

## (undated) DEVICE — STOCKINETTE,IMPERVIOUS,12X48,STERILE: Brand: MEDLINE